# Patient Record
Sex: FEMALE | Race: WHITE | NOT HISPANIC OR LATINO | Employment: OTHER | URBAN - METROPOLITAN AREA
[De-identification: names, ages, dates, MRNs, and addresses within clinical notes are randomized per-mention and may not be internally consistent; named-entity substitution may affect disease eponyms.]

---

## 2017-09-21 ENCOUNTER — HOSPITAL ENCOUNTER (OUTPATIENT)
Dept: RADIOLOGY | Facility: MEDICAL CENTER | Age: 67
End: 2017-09-21
Attending: INTERNAL MEDICINE
Payer: MEDICARE

## 2017-09-21 DIAGNOSIS — M54.40 LOW BACK PAIN WITH SCIATICA, SCIATICA LATERALITY UNSPECIFIED, UNSPECIFIED BACK PAIN LATERALITY, UNSPECIFIED CHRONICITY: ICD-10-CM

## 2017-09-21 DIAGNOSIS — M54.5 LOW BACK PAIN, UNSPECIFIED BACK PAIN LATERALITY, UNSPECIFIED CHRONICITY, WITH SCIATICA PRESENCE UNSPECIFIED: ICD-10-CM

## 2017-09-21 PROCEDURE — 72131 CT LUMBAR SPINE W/O DYE: CPT

## 2017-09-21 PROCEDURE — 72192 CT PELVIS W/O DYE: CPT

## 2018-01-16 ENCOUNTER — HOSPITAL ENCOUNTER (OUTPATIENT)
Dept: RADIOLOGY | Facility: MEDICAL CENTER | Age: 68
End: 2018-01-16
Attending: INTERNAL MEDICINE
Payer: MEDICARE

## 2018-01-16 DIAGNOSIS — M54.5 LOW BACK PAIN, UNSPECIFIED BACK PAIN LATERALITY, UNSPECIFIED CHRONICITY, WITH SCIATICA PRESENCE UNSPECIFIED: ICD-10-CM

## 2018-01-16 DIAGNOSIS — M25.559 PAIN IN JOINT INVOLVING PELVIC REGION AND THIGH, UNSPECIFIED LATERALITY: ICD-10-CM

## 2018-01-16 PROCEDURE — 73721 MRI JNT OF LWR EXTRE W/O DYE: CPT | Mod: RT

## 2018-01-30 ENCOUNTER — APPOINTMENT (OUTPATIENT)
Dept: RADIOLOGY | Facility: MEDICAL CENTER | Age: 68
End: 2018-01-30
Attending: INTERNAL MEDICINE
Payer: MEDICARE

## 2018-01-30 PROCEDURE — 72148 MRI LUMBAR SPINE W/O DYE: CPT

## 2018-02-13 ENCOUNTER — APPOINTMENT (OUTPATIENT)
Dept: RADIOLOGY | Facility: MEDICAL CENTER | Age: 68
End: 2018-02-13
Attending: INTERNAL MEDICINE
Payer: MEDICARE

## 2018-02-13 PROCEDURE — 73721 MRI JNT OF LWR EXTRE W/O DYE: CPT | Mod: LT

## 2018-05-08 ENCOUNTER — HOSPITAL ENCOUNTER (OUTPATIENT)
Dept: LAB | Facility: MEDICAL CENTER | Age: 68
End: 2018-05-08
Attending: INTERNAL MEDICINE
Payer: MEDICARE

## 2018-05-08 LAB
BASOPHILS # BLD AUTO: 0.4 % (ref 0–1.8)
BASOPHILS # BLD: 0.03 K/UL (ref 0–0.12)
CHOLEST SERPL-MCNC: 166 MG/DL (ref 100–199)
EOSINOPHIL # BLD AUTO: 0 K/UL (ref 0–0.51)
EOSINOPHIL NFR BLD: 0 % (ref 0–6.9)
ERYTHROCYTE [DISTWIDTH] IN BLOOD BY AUTOMATED COUNT: 43.9 FL (ref 35.9–50)
HCT VFR BLD AUTO: 40.8 % (ref 37–47)
HDLC SERPL-MCNC: 48 MG/DL
HGB BLD-MCNC: 13.4 G/DL (ref 12–16)
IMM GRANULOCYTES # BLD AUTO: 0.02 K/UL (ref 0–0.11)
IMM GRANULOCYTES NFR BLD AUTO: 0.3 % (ref 0–0.9)
LDLC SERPL CALC-MCNC: 85 MG/DL
LYMPHOCYTES # BLD AUTO: 1.03 K/UL (ref 1–4.8)
LYMPHOCYTES NFR BLD: 14.2 % (ref 22–41)
MCH RBC QN AUTO: 33.8 PG (ref 27–33)
MCHC RBC AUTO-ENTMCNC: 32.8 G/DL (ref 33.6–35)
MCV RBC AUTO: 103 FL (ref 81.4–97.8)
MONOCYTES # BLD AUTO: 0.4 K/UL (ref 0–0.85)
MONOCYTES NFR BLD AUTO: 5.5 % (ref 0–13.4)
NEUTROPHILS # BLD AUTO: 5.75 K/UL (ref 2–7.15)
NEUTROPHILS NFR BLD: 79.6 % (ref 44–72)
NRBC # BLD AUTO: 0 K/UL
NRBC BLD-RTO: 0 /100 WBC
PLATELET # BLD AUTO: 175 K/UL (ref 164–446)
PMV BLD AUTO: 10.5 FL (ref 9–12.9)
RBC # BLD AUTO: 3.96 M/UL (ref 4.2–5.4)
TRIGL SERPL-MCNC: 166 MG/DL (ref 0–149)
WBC # BLD AUTO: 7.2 K/UL (ref 4.8–10.8)

## 2018-05-08 PROCEDURE — 80061 LIPID PANEL: CPT

## 2018-05-08 PROCEDURE — 36415 COLL VENOUS BLD VENIPUNCTURE: CPT

## 2018-05-08 PROCEDURE — 85025 COMPLETE CBC W/AUTO DIFF WBC: CPT

## 2019-10-02 ENCOUNTER — OFFICE VISIT (OUTPATIENT)
Dept: URGENT CARE | Facility: CLINIC | Age: 69
End: 2019-10-02
Payer: MEDICARE

## 2019-10-02 ENCOUNTER — HOSPITAL ENCOUNTER (OUTPATIENT)
Facility: MEDICAL CENTER | Age: 69
End: 2019-10-02
Attending: NURSE PRACTITIONER
Payer: MEDICARE

## 2019-10-02 VITALS
TEMPERATURE: 97.5 F | RESPIRATION RATE: 16 BRPM | OXYGEN SATURATION: 93 % | SYSTOLIC BLOOD PRESSURE: 110 MMHG | HEART RATE: 94 BPM | DIASTOLIC BLOOD PRESSURE: 80 MMHG

## 2019-10-02 DIAGNOSIS — N61.1 ABSCESS OF BREAST: ICD-10-CM

## 2019-10-02 PROCEDURE — 87205 SMEAR GRAM STAIN: CPT

## 2019-10-02 PROCEDURE — 87070 CULTURE OTHR SPECIMN AEROBIC: CPT

## 2019-10-02 PROCEDURE — 10060 I&D ABSCESS SIMPLE/SINGLE: CPT | Performed by: NURSE PRACTITIONER

## 2019-10-02 RX ORDER — ATORVASTATIN CALCIUM 20 MG/1
20 TABLET, FILM COATED ORAL NIGHTLY
COMMUNITY

## 2019-10-02 RX ORDER — TRAZODONE HYDROCHLORIDE 50 MG/1
50 TABLET ORAL NIGHTLY
COMMUNITY

## 2019-10-02 RX ORDER — MULTIVIT-MIN/IRON/FOLIC ACID/K 18-600-40
500 CAPSULE ORAL DAILY
COMMUNITY

## 2019-10-02 RX ORDER — POTASSIUM CHLORIDE 750 MG/1
10 TABLET, EXTENDED RELEASE ORAL 2 TIMES DAILY
COMMUNITY
End: 2020-05-18

## 2019-10-02 RX ORDER — QUETIAPINE FUMARATE 100 MG/1
100 TABLET, FILM COATED ORAL DAILY
COMMUNITY

## 2019-10-02 RX ORDER — LEVOTHYROXINE SODIUM 0.05 MG/1
50 TABLET ORAL
COMMUNITY

## 2019-10-02 RX ORDER — MIRTAZAPINE 15 MG/1
15 TABLET, FILM COATED ORAL NIGHTLY
COMMUNITY

## 2019-10-02 RX ORDER — CELECOXIB 100 MG/1
100 CAPSULE ORAL 2 TIMES DAILY
Status: ON HOLD | COMMUNITY
End: 2020-02-03

## 2019-10-02 RX ORDER — FUROSEMIDE 20 MG/1
20 TABLET ORAL 2 TIMES DAILY
Status: ON HOLD | COMMUNITY
End: 2020-02-03

## 2019-10-02 RX ORDER — DIVALPROEX SODIUM 125 MG/1
375 CAPSULE, COATED PELLETS ORAL 2 TIMES DAILY
COMMUNITY

## 2019-10-02 RX ORDER — DOXYCYCLINE 100 MG/1
CAPSULE ORAL
Refills: 0 | Status: ON HOLD | COMMUNITY
Start: 2019-09-28 | End: 2020-02-03

## 2019-10-02 RX ORDER — SULFAMETHOXAZOLE AND TRIMETHOPRIM 800; 160 MG/1; MG/1
1 TABLET ORAL 2 TIMES DAILY
Qty: 14 TAB | Refills: 0 | Status: SHIPPED | OUTPATIENT
Start: 2019-10-02 | End: 2019-10-09

## 2019-10-02 RX ORDER — POLYETHYLENE GLYCOL 3350 17 G/17G
17 POWDER, FOR SOLUTION ORAL DAILY
COMMUNITY

## 2019-10-02 NOTE — PROGRESS NOTES
"  Chief Complaint   Patient presents with   • Abscess     x 6 days, abces on Rt. breast, redness, swelling       HISTORY OF PRESENT ILLNESS: Patient is a 69 y.o. female who presents to urgent care today by her caregiver, both provide the history.  She notes that she has had a small \"pimple\" to her right breast for the past year.  Notes that the area has become more tender, swollen, painful over the past 4 days.  Notes low-grade fever as well.  Denies any other symptoms.  She has notified the patient's PCP, they have placed her on doxycycline which she has taken for the past 3 days without improvement.  Patient does have a baseline history of Alzheimer's, no recent neurological changes or increase in confusion per her caregiver.    There are no active problems to display for this patient.      Allergies:Ampicillin    Current Outpatient Medications Ordered in Epic   Medication Sig Dispense Refill   • atorvastatin (LIPITOR) 20 MG Tab Take 20 mg by mouth every evening.     • celecoxib (CELEBREX) 100 MG Cap Take 100 mg by mouth 2 times a day.     • Divalproex Sodium (DEPAKOTE) 125 MG Capsule Delayed Release Sprinkle Take 125 mg by mouth 2 Times a Day.     • furosemide (LASIX) 20 MG Tab Take 20 mg by mouth 2 times a day.     • levothyroxine (SYNTHROID) 50 MCG Tab Take 50 mcg by mouth Every morning on an empty stomach.     • mirtazapine (REMERON) 15 MG Tab Take 15 mg by mouth every evening.     • polyethylene glycol/lytes (MIRALAX) Pack Take 17 g by mouth every day.     • potassium chloride SA (K-DUR) 10 MEQ Tab CR Take 10 mEq by mouth 2 times a day.     • QUEtiapine (SEROQUEL) 100 MG Tab Take 100 mg by mouth 2 times a day.     • travoprost (TRAVATAN Z) 0.004 % Solution Place 1 Drop in both eyes every evening.     • traZODone (DESYREL) 50 MG Tab Take 50 mg by mouth every evening.     • Ascorbic Acid (VITAMIN C) 500 MG Cap Take  by mouth.     • doxycycline (MONODOX) 100 MG capsule TAKE 1 CAPSULE BYMOUTH TWICE DAILY FOR 10 " DAYS  0   • sulfamethoxazole-trimethoprim (BACTRIM DS) 800-160 MG tablet Take 1 Tab by mouth 2 times a day for 7 days. 14 Tab 0   • raloxifene (EVISTA) 60 MG TABS Take 60 mg by mouth every day.     • citalopram (CELEXA) 20 MG TABS Take 20 mg by mouth every day.     • travoprost (TRAVOPROST) 0.004 % SOLN Place 1 Drop in both eyes every evening.     • aspirin 81 MG tablet Take 81 mg by mouth every day.     • SENNA by Does not apply route as needed.     • gemfibrozil (LOPID) 600 MG TABS Take 300 mg by mouth 2 Times a Day.     • clonazepam (KLONOPIN) 2 MG tablet Take 1 mg by mouth every bedtime.     • Olopatadine HCl (PATADAY) 0.2 % SOLN by Ophthalmic route.     • ranitidine (ZANTAC) 150 MG TABS Take 150 mg by mouth 2 times a day.     • Naproxen Sodium (ALEVE) 220 MG CAPS Take  by mouth.     • memantine (NAMENDA) 5 MG TABS Take 2 Tabs by mouth 2 times a day. 120 Each 3     No current Epic-ordered facility-administered medications on file.        Past Medical History:   Diagnosis Date   • Dementia (HCC)    • Dementia (HCC)        Social History     Tobacco Use   • Smoking status: Never Smoker   • Smokeless tobacco: Never Used   Substance Use Topics   • Alcohol use: No   • Drug use: No       No family status information on file.   No family history on file.    ROS:  Review of Systems   Constitutional: Positive for subjective fever.  Negative for chills, weight loss, malaise, and fatigue.   HENT: Negative for ear pain, nosebleeds, congestion, sore throat and neck pain.    Eyes: Negative for vision changes.   Neuro: Negative for headache, sensory changes, weakness, seizure, LOC.   Cardiovascular: Negative for chest pain, palpitations, orthopnea and leg swelling.   Respiratory: Negative for cough, sputum production, shortness of breath and wheezing.   Gastrointestinal: Negative for abdominal pain, nausea, vomiting or diarrhea.   Genitourinary: Negative for dysuria, urgency and frequency.  Musculoskeletal: Negative for falls,  neck pain, back pain, joint pain, myalgias.   Skin: Positive for painful abscess to right breast.  Negative for rash, diaphoresis.     Exam:  /80 (BP Location: Left arm, Patient Position: Sitting, BP Cuff Size: Adult)   Pulse 94   Temp 36.4 °C (97.5 °F)   Resp 16   SpO2 93%   General: well-nourished, well-developed female in NAD  Head: normocephalic, atraumatic  Eyes: PERRLA, no conjunctival injection, acuity grossly intact, lids normal.  Ears: normal shape and symmetry, no tenderness, no discharge. External canals are without any significant edema or erythema. Tympanic membranes are without any inflammation, no effusion. Gross auditory acuity is intact.  Nose: symmetrical without tenderness, no discharge.  Mouth/Throat: reasonable hygiene, no erythema, exudates or tonsillar enlargement.  Neck: no masses, range of motion within normal limits, no tracheal deviation. No obvious thyroid enlargement.   Lymph: no cervical adenopathy. No supraclavicular adenopathy.   Neuro: alert and oriented at baseline per caregiver.    Cardiovascular: regular rate and rhythm. No edema.  Pulmonary: no distress. Chest is symmetrical with respiration, no wheezes, crackles, or rhonchi.   Musculoskeletal: no clubbing, appropriate muscle tone, patient is in wheelchair.  Skin: warm, no clubbing, no cyanosis, no rashes.  There is a small, 2 cm, round, fluctuant lesion noted to right breast, area is tender to touch, minimal surrounding erythema, no active drainage.  Psych: appropriate mood, affect, judgement.         Assessment/Plan:  1. Abscess of breast  CULTURE WOUND W/ GRAM STAIN    sulfamethoxazole-trimethoprim (BACTRIM DS) 800-160 MG tablet         Procedure: Incision and Drainage  -Risks, benefits, and alternatives discussed. Risks include infection, bleeding, nerve damage, and poor cosmetic outcome  -Clean technique with sterile instruments  -Local anesthesia with 2% lidocaine  -Incision with #11 blade into fluctuant area with  purulent material expressed  -Culture obtained and packaged for lab  -Irrigated copiously with sterile saline  -No packing placed due to size  -Minimal bleeding with good hemostasis achieved  -The patient tolerated the procedure well        Patient is changed from doxycycline to Bactrim, increase fluid intake.  Culture obtained, will call the patient's caregiver at [phone], Susan, with results.  Wound care discussed.  Follow-up in 3 days with PCP.  Supportive care, differential diagnoses, and indications for immediate follow-up discussed with patient.   Pathogenesis of diagnosis discussed including typical length and natural progression.   Instructed to return to clinic or nearest emergency department for any change in condition, further concerns, or worsening of symptoms.  Patient states understanding of the plan of care and discharge instructions.  Instructed to make an appointment, for follow up, with her primary care provider.        Please note that this dictation was created using voice recognition software. I have made every reasonable attempt to correct obvious errors, but I expect that there are errors of grammar and possibly content that I did not discover before finalizing the note.      KATIA Murillo.

## 2019-10-03 LAB
GRAM STN SPEC: NORMAL
SIGNIFICANT IND 70042: NORMAL
SITE SITE: NORMAL
SOURCE SOURCE: NORMAL

## 2019-10-06 LAB
BACTERIA WND AEROBE CULT: NORMAL
GRAM STN SPEC: NORMAL
SIGNIFICANT IND 70042: NORMAL
SITE SITE: NORMAL
SOURCE SOURCE: NORMAL

## 2020-01-16 ENCOUNTER — HOSPITAL ENCOUNTER (OUTPATIENT)
Dept: LAB | Facility: MEDICAL CENTER | Age: 70
End: 2020-01-16
Attending: INTERNAL MEDICINE
Payer: MEDICARE

## 2020-01-16 LAB
ANION GAP SERPL CALC-SCNC: 9 MMOL/L (ref 0–11.9)
BUN SERPL-MCNC: 18 MG/DL (ref 8–22)
CALCIUM SERPL-MCNC: 9.4 MG/DL (ref 8.5–10.5)
CHLORIDE SERPL-SCNC: 108 MMOL/L (ref 96–112)
CO2 SERPL-SCNC: 25 MMOL/L (ref 20–33)
CREAT SERPL-MCNC: 0.85 MG/DL (ref 0.5–1.4)
GLUCOSE SERPL-MCNC: 87 MG/DL (ref 65–99)
POTASSIUM SERPL-SCNC: 3.9 MMOL/L (ref 3.6–5.5)
SODIUM SERPL-SCNC: 142 MMOL/L (ref 135–145)

## 2020-01-16 PROCEDURE — 80048 BASIC METABOLIC PNL TOTAL CA: CPT

## 2020-01-16 PROCEDURE — 36415 COLL VENOUS BLD VENIPUNCTURE: CPT

## 2020-01-30 ENCOUNTER — HOSPITAL ENCOUNTER (INPATIENT)
Facility: MEDICAL CENTER | Age: 70
LOS: 3 days | DRG: 872 | End: 2020-02-03
Attending: EMERGENCY MEDICINE | Admitting: HOSPITALIST
Payer: MEDICARE

## 2020-01-30 DIAGNOSIS — R19.7 DIARRHEA, UNSPECIFIED TYPE: ICD-10-CM

## 2020-01-30 DIAGNOSIS — R65.10 SYSTEMIC INFLAMMATORY RESPONSE SYNDROME (HCC): ICD-10-CM

## 2020-01-30 DIAGNOSIS — R11.2 INTRACTABLE VOMITING WITH NAUSEA, UNSPECIFIED VOMITING TYPE: ICD-10-CM

## 2020-01-30 LAB
BASOPHILS # BLD AUTO: 0.6 % (ref 0–1.8)
BASOPHILS # BLD: 0.09 K/UL (ref 0–0.12)
EOSINOPHIL # BLD AUTO: 0.08 K/UL (ref 0–0.51)
EOSINOPHIL NFR BLD: 0.6 % (ref 0–6.9)
ERYTHROCYTE [DISTWIDTH] IN BLOOD BY AUTOMATED COUNT: 57.2 FL (ref 35.9–50)
HCT VFR BLD AUTO: 36.8 % (ref 37–47)
HGB BLD-MCNC: 11.1 G/DL (ref 12–16)
IMM GRANULOCYTES # BLD AUTO: 0.1 K/UL (ref 0–0.11)
IMM GRANULOCYTES NFR BLD AUTO: 0.7 % (ref 0–0.9)
LACTATE BLD-SCNC: 3.3 MMOL/L (ref 0.5–2)
LYMPHOCYTES # BLD AUTO: 1.5 K/UL (ref 1–4.8)
LYMPHOCYTES NFR BLD: 10.5 % (ref 22–41)
MCH RBC QN AUTO: 27.6 PG (ref 27–33)
MCHC RBC AUTO-ENTMCNC: 30.2 G/DL (ref 33.6–35)
MCV RBC AUTO: 91.5 FL (ref 81.4–97.8)
MONOCYTES # BLD AUTO: 0.9 K/UL (ref 0–0.85)
MONOCYTES NFR BLD AUTO: 6.3 % (ref 0–13.4)
NEUTROPHILS # BLD AUTO: 11.55 K/UL (ref 2–7.15)
NEUTROPHILS NFR BLD: 81.3 % (ref 44–72)
NRBC # BLD AUTO: 0 K/UL
NRBC BLD-RTO: 0 /100 WBC
PLATELET # BLD AUTO: 312 K/UL (ref 164–446)
PMV BLD AUTO: 9.1 FL (ref 9–12.9)
RBC # BLD AUTO: 4.02 M/UL (ref 4.2–5.4)
WBC # BLD AUTO: 14.2 K/UL (ref 4.8–10.8)

## 2020-01-30 PROCEDURE — 83690 ASSAY OF LIPASE: CPT

## 2020-01-30 PROCEDURE — 99285 EMERGENCY DEPT VISIT HI MDM: CPT

## 2020-01-30 PROCEDURE — 80053 COMPREHEN METABOLIC PANEL: CPT

## 2020-01-30 PROCEDURE — 83605 ASSAY OF LACTIC ACID: CPT

## 2020-01-30 PROCEDURE — 85025 COMPLETE CBC W/AUTO DIFF WBC: CPT

## 2020-01-30 PROCEDURE — 36415 COLL VENOUS BLD VENIPUNCTURE: CPT

## 2020-01-31 ENCOUNTER — APPOINTMENT (OUTPATIENT)
Dept: RADIOLOGY | Facility: MEDICAL CENTER | Age: 70
DRG: 872 | End: 2020-01-31
Attending: EMERGENCY MEDICINE
Payer: MEDICARE

## 2020-01-31 PROBLEM — A41.9 SEPSIS (HCC): Status: ACTIVE | Noted: 2020-01-31

## 2020-01-31 PROBLEM — F03.90 DEMENTIA (HCC): Status: ACTIVE | Noted: 2020-01-31

## 2020-01-31 PROBLEM — R19.7 NAUSEA, VOMITING, AND DIARRHEA: Status: ACTIVE | Noted: 2020-01-31

## 2020-01-31 PROBLEM — E03.9 HYPOTHYROIDISM: Status: ACTIVE | Noted: 2020-01-31

## 2020-01-31 PROBLEM — R11.2 NAUSEA, VOMITING, AND DIARRHEA: Status: ACTIVE | Noted: 2020-01-31

## 2020-01-31 PROBLEM — H40.9 GLAUCOMA: Status: ACTIVE | Noted: 2020-01-31

## 2020-01-31 LAB
ALBUMIN SERPL BCP-MCNC: 3.6 G/DL (ref 3.2–4.9)
ALBUMIN/GLOB SERPL: 1.2 G/DL
ALP SERPL-CCNC: 71 U/L (ref 30–99)
ALT SERPL-CCNC: 12 U/L (ref 2–50)
ANION GAP SERPL CALC-SCNC: 12 MMOL/L (ref 0–11.9)
APPEARANCE UR: CLEAR
AST SERPL-CCNC: 16 U/L (ref 12–45)
BACTERIA #/AREA URNS HPF: ABNORMAL /HPF
BILIRUB SERPL-MCNC: 0.4 MG/DL (ref 0.1–1.5)
BILIRUB UR QL STRIP.AUTO: NEGATIVE
BUN SERPL-MCNC: 16 MG/DL (ref 8–22)
C DIFF DNA SPEC QL NAA+PROBE: NEGATIVE
C DIFF TOX GENS STL QL NAA+PROBE: NEGATIVE
CALCIUM SERPL-MCNC: 9.1 MG/DL (ref 8.5–10.5)
CHLORIDE SERPL-SCNC: 105 MMOL/L (ref 96–112)
CO2 SERPL-SCNC: 24 MMOL/L (ref 20–33)
COLOR UR: YELLOW
CREAT SERPL-MCNC: 1 MG/DL (ref 0.5–1.4)
EPI CELLS #/AREA URNS HPF: NEGATIVE /HPF
GLOBULIN SER CALC-MCNC: 3.1 G/DL (ref 1.9–3.5)
GLUCOSE SERPL-MCNC: 140 MG/DL (ref 65–99)
GLUCOSE UR STRIP.AUTO-MCNC: NEGATIVE MG/DL
HYALINE CASTS #/AREA URNS LPF: ABNORMAL /LPF
KETONES UR STRIP.AUTO-MCNC: ABNORMAL MG/DL
LACTATE BLD-SCNC: 2 MMOL/L (ref 0.5–2)
LACTATE BLD-SCNC: 2.1 MMOL/L (ref 0.5–2)
LACTATE BLD-SCNC: 3.1 MMOL/L (ref 0.5–2)
LEUKOCYTE ESTERASE UR QL STRIP.AUTO: NEGATIVE
LIPASE SERPL-CCNC: 35 U/L (ref 11–82)
MICRO URNS: ABNORMAL
NITRITE UR QL STRIP.AUTO: POSITIVE
PH UR STRIP.AUTO: 8.5 [PH] (ref 5–8)
POTASSIUM SERPL-SCNC: 3.7 MMOL/L (ref 3.6–5.5)
PROT SERPL-MCNC: 6.7 G/DL (ref 6–8.2)
PROT UR QL STRIP: NEGATIVE MG/DL
RBC # URNS HPF: ABNORMAL /HPF
RBC UR QL AUTO: ABNORMAL
SODIUM SERPL-SCNC: 141 MMOL/L (ref 135–145)
SP GR UR STRIP.AUTO: 1.03
UROBILINOGEN UR STRIP.AUTO-MCNC: 0.2 MG/DL
WBC #/AREA URNS HPF: ABNORMAL /HPF

## 2020-01-31 PROCEDURE — 770020 HCHG ROOM/CARE - TELE (206)

## 2020-01-31 PROCEDURE — 96374 THER/PROPH/DIAG INJ IV PUSH: CPT

## 2020-01-31 PROCEDURE — 700105 HCHG RX REV CODE 258: Performed by: HOSPITALIST

## 2020-01-31 PROCEDURE — 74177 CT ABD & PELVIS W/CONTRAST: CPT

## 2020-01-31 PROCEDURE — 83605 ASSAY OF LACTIC ACID: CPT

## 2020-01-31 PROCEDURE — 99223 1ST HOSP IP/OBS HIGH 75: CPT | Performed by: HOSPITALIST

## 2020-01-31 PROCEDURE — 96376 TX/PRO/DX INJ SAME DRUG ADON: CPT

## 2020-01-31 PROCEDURE — 700105 HCHG RX REV CODE 258: Performed by: EMERGENCY MEDICINE

## 2020-01-31 PROCEDURE — 87493 C DIFF AMPLIFIED PROBE: CPT

## 2020-01-31 PROCEDURE — 96361 HYDRATE IV INFUSION ADD-ON: CPT

## 2020-01-31 PROCEDURE — 700111 HCHG RX REV CODE 636 W/ 250 OVERRIDE (IP): Performed by: EMERGENCY MEDICINE

## 2020-01-31 PROCEDURE — 81001 URINALYSIS AUTO W/SCOPE: CPT

## 2020-01-31 PROCEDURE — 700111 HCHG RX REV CODE 636 W/ 250 OVERRIDE (IP): Performed by: HOSPITALIST

## 2020-01-31 PROCEDURE — 700101 HCHG RX REV CODE 250: Performed by: HOSPITALIST

## 2020-01-31 PROCEDURE — 700117 HCHG RX CONTRAST REV CODE 255: Performed by: EMERGENCY MEDICINE

## 2020-01-31 PROCEDURE — 36415 COLL VENOUS BLD VENIPUNCTURE: CPT

## 2020-01-31 RX ORDER — SODIUM CHLORIDE 9 MG/ML
INJECTION, SOLUTION INTRAVENOUS
Status: ACTIVE
Start: 2020-01-31 | End: 2020-01-31

## 2020-01-31 RX ORDER — AMOXICILLIN 250 MG
2 CAPSULE ORAL 2 TIMES DAILY
Status: DISCONTINUED | OUTPATIENT
Start: 2020-01-31 | End: 2020-01-31

## 2020-01-31 RX ORDER — HEPARIN SODIUM 5000 [USP'U]/ML
5000 INJECTION, SOLUTION INTRAVENOUS; SUBCUTANEOUS EVERY 8 HOURS
Status: DISCONTINUED | OUTPATIENT
Start: 2020-01-31 | End: 2020-02-03 | Stop reason: HOSPADM

## 2020-01-31 RX ORDER — ONDANSETRON 2 MG/ML
4 INJECTION INTRAMUSCULAR; INTRAVENOUS ONCE
Status: COMPLETED | OUTPATIENT
Start: 2020-01-31 | End: 2020-01-31

## 2020-01-31 RX ORDER — POLYETHYLENE GLYCOL 3350 17 G/17G
1 POWDER, FOR SOLUTION ORAL
Status: DISCONTINUED | OUTPATIENT
Start: 2020-01-31 | End: 2020-01-31

## 2020-01-31 RX ORDER — BISACODYL 10 MG
10 SUPPOSITORY, RECTAL RECTAL
Status: DISCONTINUED | OUTPATIENT
Start: 2020-01-31 | End: 2020-01-31

## 2020-01-31 RX ORDER — SODIUM CHLORIDE 9 MG/ML
1000 INJECTION, SOLUTION INTRAVENOUS ONCE
Status: COMPLETED | OUTPATIENT
Start: 2020-01-31 | End: 2020-01-31

## 2020-01-31 RX ORDER — SODIUM CHLORIDE, SODIUM LACTATE, POTASSIUM CHLORIDE, CALCIUM CHLORIDE 600; 310; 30; 20 MG/100ML; MG/100ML; MG/100ML; MG/100ML
2000 INJECTION, SOLUTION INTRAVENOUS ONCE
Status: COMPLETED | OUTPATIENT
Start: 2020-01-31 | End: 2020-02-01

## 2020-01-31 RX ADMIN — IOHEXOL 100 ML: 350 INJECTION, SOLUTION INTRAVENOUS at 02:04

## 2020-01-31 RX ADMIN — CEFTRIAXONE SODIUM 2 G: 2 INJECTION, POWDER, FOR SOLUTION INTRAMUSCULAR; INTRAVENOUS at 06:24

## 2020-01-31 RX ADMIN — METRONIDAZOLE 500 MG: 500 INJECTION, SOLUTION INTRAVENOUS at 21:45

## 2020-01-31 RX ADMIN — HEPARIN SODIUM 5000 UNITS: 5000 INJECTION, SOLUTION INTRAVENOUS; SUBCUTANEOUS at 14:34

## 2020-01-31 RX ADMIN — METRONIDAZOLE 500 MG: 500 INJECTION, SOLUTION INTRAVENOUS at 14:33

## 2020-01-31 RX ADMIN — ONDANSETRON 4 MG: 2 INJECTION INTRAMUSCULAR; INTRAVENOUS at 00:40

## 2020-01-31 RX ADMIN — HEPARIN SODIUM 5000 UNITS: 5000 INJECTION, SOLUTION INTRAVENOUS; SUBCUTANEOUS at 06:23

## 2020-01-31 RX ADMIN — ONDANSETRON 4 MG: 2 INJECTION INTRAMUSCULAR; INTRAVENOUS at 01:35

## 2020-01-31 RX ADMIN — SODIUM CHLORIDE, POTASSIUM CHLORIDE, SODIUM LACTATE AND CALCIUM CHLORIDE 2000 ML: 600; 310; 30; 20 INJECTION, SOLUTION INTRAVENOUS at 06:22

## 2020-01-31 RX ADMIN — METRONIDAZOLE 500 MG: 500 INJECTION, SOLUTION INTRAVENOUS at 07:31

## 2020-01-31 RX ADMIN — SODIUM CHLORIDE 1000 ML: 9 INJECTION, SOLUTION INTRAVENOUS at 00:40

## 2020-01-31 RX ADMIN — HEPARIN SODIUM 5000 UNITS: 5000 INJECTION, SOLUTION INTRAVENOUS; SUBCUTANEOUS at 21:45

## 2020-01-31 ASSESSMENT — PATIENT HEALTH QUESTIONNAIRE - PHQ9
2. FEELING DOWN, DEPRESSED, IRRITABLE, OR HOPELESS: NOT AT ALL
1. LITTLE INTEREST OR PLEASURE IN DOING THINGS: NOT AT ALL
SUM OF ALL RESPONSES TO PHQ9 QUESTIONS 1 AND 2: 0

## 2020-01-31 NOTE — ED NOTES
Bedside report to receiving RN for T810. Questions answered. All belongings accounted for at transfer.

## 2020-01-31 NOTE — ED PROVIDER NOTES
"ED Provider Note    CHIEF COMPLAINT  Chief Complaint   Patient presents with   • Abdominal Pain   • N/V       HPI  Sameera Salinas is a 69 y.o. female who presents for evaluation of sudden onset nausea and vomiting which started around 10:00 tonight.  The patient's group home also has another sick resident who was vomiting this morning and ate the same dinner last night.  It is unclear if this is related however.  The patient has not had any diarrhea and no measured fevers.  She was complaining of abdominal pain when the vomiting started.  She has had multiple episodes.  She has known dementia and is generally pleasantly confused to situation, time, and place    REVIEW OF SYSTEMS * unclear reliability due to dementia  Constitutional: No measured fevers.  Current chills noted.  Fatigue noted.  Skin: No rashes  HEENT: No sore throat, runny nose, sores, or trouble swallowing  Neck: No neck pain  Chest: No pain   Pulm: No shortness of breath or cough  Gastrointestinal: No diarrhea or constipation  Genitourinary: No dysuria or hematuria  Musculoskeletal: No recent trauma, pain, swelling, weakness  Heme: No bleeding or bruising problems.   Immuno: No hx of recurrent infections      PAST MEDICAL HISTORY   has a past medical history of Dementia (HCC) and Dementia (HCC).    SOCIAL HISTORY  Social History     Tobacco Use   • Smoking status: Never Smoker   • Smokeless tobacco: Never Used   Substance and Sexual Activity   • Alcohol use: No   • Drug use: No   • Sexual activity: Not on file       SURGICAL HISTORY  patient denies any surgical history    CURRENT MEDICATIONS  Home Medications    **Home medications have not yet been reviewed for this encounter**         ALLERGIES  Allergies   Allergen Reactions   • Ampicillin Rash       PHYSICAL EXAM  VITAL SIGNS: /72   Pulse (!) 120   Temp 36.4 °C (97.6 °F) (Temporal)   Ht 1.6 m (5' 3\")   Wt 59 kg (130 lb)   SpO2 94%   BMI 23.03 kg/m²    Gen: Mildly anxious, pale, " sweaty  HEENT: No signs of trauma, Bilateral external ears normal, Nose normal. Conjunctiva normal, Non-icteric.   Neck:  No tenderness, Supple, No masses  Lymphatic: No cervical lymphadenopathy noted.   Cardiovascular: Mild tachycardia with regular rhythm.  Thorax & Lungs: Normal breath sounds, No respiratory distress, No wheezing bilateral chest rise  Abdomen: Bowel sounds normal, Soft, diffuse mild to moderate tenderness, No masses, No pulsatile masses. Skin: Cool, pale, sweaty, No erythema, No rash to exposed areas.   Extremities: Intact distal pulses, No edema      LABS  Results for orders placed or performed during the hospital encounter of 01/30/20   CBC WITH DIFFERENTIAL   Result Value Ref Range    WBC 14.2 (H) 4.8 - 10.8 K/uL    RBC 4.02 (L) 4.20 - 5.40 M/uL    Hemoglobin 11.1 (L) 12.0 - 16.0 g/dL    Hematocrit 36.8 (L) 37.0 - 47.0 %    MCV 91.5 81.4 - 97.8 fL    MCH 27.6 27.0 - 33.0 pg    MCHC 30.2 (L) 33.6 - 35.0 g/dL    RDW 57.2 (H) 35.9 - 50.0 fL    Platelet Count 312 164 - 446 K/uL    MPV 9.1 9.0 - 12.9 fL    Neutrophils-Polys 81.30 (H) 44.00 - 72.00 %    Lymphocytes 10.50 (L) 22.00 - 41.00 %    Monocytes 6.30 0.00 - 13.40 %    Eosinophils 0.60 0.00 - 6.90 %    Basophils 0.60 0.00 - 1.80 %    Immature Granulocytes 0.70 0.00 - 0.90 %    Nucleated RBC 0.00 /100 WBC    Neutrophils (Absolute) 11.55 (H) 2.00 - 7.15 K/uL    Lymphs (Absolute) 1.50 1.00 - 4.80 K/uL    Monos (Absolute) 0.90 (H) 0.00 - 0.85 K/uL    Eos (Absolute) 0.08 0.00 - 0.51 K/uL    Baso (Absolute) 0.09 0.00 - 0.12 K/uL    Immature Granulocytes (abs) 0.10 0.00 - 0.11 K/uL    NRBC (Absolute) 0.00 K/uL   COMP METABOLIC PANEL   Result Value Ref Range    Sodium 141 135 - 145 mmol/L    Potassium 3.7 3.6 - 5.5 mmol/L    Chloride 105 96 - 112 mmol/L    Co2 24 20 - 33 mmol/L    Anion Gap 12.0 (H) 0.0 - 11.9    Glucose 140 (H) 65 - 99 mg/dL    Bun 16 8 - 22 mg/dL    Creatinine 1.00 0.50 - 1.40 mg/dL    Calcium 9.1 8.5 - 10.5 mg/dL    AST(SGOT) 16 12  - 45 U/L    ALT(SGPT) 12 2 - 50 U/L    Alkaline Phosphatase 71 30 - 99 U/L    Total Bilirubin 0.4 0.1 - 1.5 mg/dL    Albumin 3.6 3.2 - 4.9 g/dL    Total Protein 6.7 6.0 - 8.2 g/dL    Globulin 3.1 1.9 - 3.5 g/dL    A-G Ratio 1.2 g/dL   LIPASE   Result Value Ref Range    Lipase 35 11 - 82 U/L   LACTIC ACID   Result Value Ref Range    Lactic Acid 3.3 (H) 0.5 - 2.0 mmol/L   ESTIMATED GFR   Result Value Ref Range    GFR If African American >60 >60 mL/min/1.73 m 2    GFR If Non African American 55 (A) >60 mL/min/1.73 m 2       RADIOLOGY  CT-ABDOMEN-PELVIS WITH   Final Result      1.  No evidence of acute inflammatory process in the abdomen or pelvis   2.  Cholelithiasis   3.  Moderate-sized hiatal hernia   4.  Incompletely characterized LEFT renal lesion which may have a thick septation. Recommend further assessment with renal mass protocol CT or MRI when clinically appropriate   5.  Atherosclerosis   6.  Colonic diverticulosis                         HYDRATION: Based on the patient's presentation of Acute Diarrhea the patient was given IV fluids. IV Hydration was used because oral hydration was not adequate alone. Upon recheck following hydration, the patient was stable.    COURSE & MEDICAL DECISION MAKING  Pertinent Labs & Imaging studies reviewed. (See chart for details)  Patient's initial evaluation is suggestive of possible gastroenteritis however her abdominal pain, which is poorly localized and inconsistent in its character and severity per the patient, could indicate surgical pathology such as cholecystitis, diverticulitis, small bowel obstruction, or appendicitis.  I will consider cardiac pathology however think this is far less likely than gastroenteritis of some sort.  The patient will be started on IV fluids as she is clearly incapable of keeping p.o. fluids down and will become dehydrated quickly.  Patient has not been on antibiotics recently but is at risk for C. difficile due to her age and the fact that  she lives in a group home.  There is a possible sick contact as well and another resident of a group home.    2:15 AM  Patient is still nauseous and vomiting intermittently despite 2 doses of Zofran and IV fluids.  CT imaging demonstrates cholelithiasis, moderate sized hernia and a left renal lesion of unclear significance.  There are no clear findings to suggest surgical pathology at this time but, as the patient is quite intractably vomiting and having profuse diarrhea with incontinence, she will need to be admitted for further treatment and evaluation.  I will add on C. difficile testing.  Patient is remaining tachycardic and meets criteria for Sirs based on a white blood cell count over 12,000.  This is undifferentiated and there are no convincing findings to suggest a significant bacterial source at this time.  Prophylactic antibiotics seem unwise at this point given the possibility of C. difficile and a lack of any other bacterial source so I will defer this to the judgment of the admitting hospitalist.    FINAL IMPRESSION  1. Intractable vomiting with nausea, unspecified vomiting type    2. Diarrhea, unspecified type    3. Systemic inflammatory response syndrome (HCC)        Electronically signed by: Melecio Villasenor M.D., 1/30/2020 11:36 PM

## 2020-01-31 NOTE — PROGRESS NOTES
Accepted patient admitted by my colleague  Patient has advanced dementia  Nurse was talking to the caretaker and apparently she is a DNR/DNI but we need to confirm this with the legal guardian  Patient cannot provide any history for me  C. difficile has been ruled out, stop isolation precautions and oral vancomycin  Treat empirically  Continue IV antibiotic

## 2020-01-31 NOTE — PROGRESS NOTES
2 RN skin check completed with Santos HWANG.  Devices in place n/a.  Skin assessed under devices n/a.  Confirmed pressure ulcers found on n/a.  New potential pressure ulcers noted on n/a. Wound consult placed n/a.    No open wounds or pressure ulcers noted. Mild incontinent dermatitis/excoriation to buttock. Otherwise skin completely intact.     The following interventions in place: Waffle mattress, q2h turns, incontinence management.

## 2020-01-31 NOTE — CARE PLAN
Problem: Safety  Goal: Will remain free from injury  Outcome: PROGRESSING AS EXPECTED   Fall precautions in place. Bed in lowest position. Non-skid socks in place. Personal possessions within reach. Mobility sign on door. Bed-alarm on. Call light within reach. Pt educated regarding fall prevention, needs continued follow up. Presenting with confusion A&Ox self only.   Problem: Infection  Goal: Will remain free from infection  Outcome: PROGRESSING AS EXPECTED   Pt receiving IV antibiotics. R/o C.diff. Urine sent.

## 2020-01-31 NOTE — PROGRESS NOTES
Pt admitted to room T-810 in stable condition. Cleaned up of large liquid incontinence in ED prior to bringing to floor. Denies any pain, nausea or abdominal discomfort at this time. Alert and oriented to self only, very confused. IVF and antibiotics started. Pt appears comfortable resting in bed at this time. Denies any needs. Bed alarm set and call light in reach.

## 2020-01-31 NOTE — ASSESSMENT & PLAN NOTE
Treatment as noted above.  Symptomatic management for nausea and vomiting.  Continue IV fluid hydration, but will decrease the rate as oral intake is improved

## 2020-01-31 NOTE — ASSESSMENT & PLAN NOTE
We will need to obtain information from the group home regarding her home medications and resume in the morning.

## 2020-01-31 NOTE — ED NOTES
Pt incontinent of stool. Pt changed and cleaned up. Linens changed. Pt began vomiting in room. ERP aware. Orders received.

## 2020-01-31 NOTE — H&P
Hospital Medicine History & Physical Note    Date of Service  1/31/2020    Primary Care Physician  Noni Velásquez M.D.    Consultants  None    Code Status  Full    Chief Complaint  Chief Complaint   Patient presents with   • Abdominal Pain   • N/V       History of Presenting Illness  69 y.o. female who presented on 1/30/2020 with nausea, vomiting, and diarrhea.  Patient carries a history of dementia which is advanced, she is unable to provide any meaningful history.  No staff members from her group home have accompanied her to the emergency room therefore history is obtained wholly and review of available medical records and discussion the emergency room physician.  Only available medical records are a note from [facility] from 2018 as well as a handwritten note from her PCP from 2018.  She apparently has a history of chronic lower back pain, osteopenia, glaucoma, depression, and hypothyroidism.  This evening around 8 PM, the patient had acute onset nausea, vomiting, and upon arrival in the emergency room, had several episodes of malodorous diarrhea.  Otherwise no additional history can be obtained at this time.    Review of Systems  Review of Systems   Unable to perform ROS: Dementia       Past Medical History  Past Medical History:   Diagnosis Date   • Dementia (HCC)    • Dementia (HCC)        Surgical History  Unable to obtain from patient who cannot effectively communicate    Family History  Unable to obtain from patient who cannot effectively communicate    Social History pain  Social History     Tobacco Use   • Smoking status: Never Smoker   • Smokeless tobacco: Never Used   Substance Use Topics   • Alcohol use: No   • Drug use: No       Allergies  Allergies   Allergen Reactions   • Ampicillin Rash       Medications  No current facility-administered medications on file prior to encounter.      Current Outpatient Medications on File Prior to Encounter   Medication Sig Dispense Refill   • atorvastatin  (LIPITOR) 20 MG Tab Take 20 mg by mouth every evening.     • celecoxib (CELEBREX) 100 MG Cap Take 100 mg by mouth 2 times a day.     • Divalproex Sodium (DEPAKOTE) 125 MG Capsule Delayed Release Sprinkle Take 125 mg by mouth 2 Times a Day.     • furosemide (LASIX) 20 MG Tab Take 20 mg by mouth 2 times a day.     • levothyroxine (SYNTHROID) 50 MCG Tab Take 50 mcg by mouth Every morning on an empty stomach.     • mirtazapine (REMERON) 15 MG Tab Take 15 mg by mouth every evening.     • polyethylene glycol/lytes (MIRALAX) Pack Take 17 g by mouth every day.     • potassium chloride SA (K-DUR) 10 MEQ Tab CR Take 10 mEq by mouth 2 times a day.     • QUEtiapine (SEROQUEL) 100 MG Tab Take 100 mg by mouth 2 times a day.     • travoprost (TRAVATAN Z) 0.004 % Solution Place 1 Drop in both eyes every evening.     • traZODone (DESYREL) 50 MG Tab Take 50 mg by mouth every evening.     • Ascorbic Acid (VITAMIN C) 500 MG Cap Take  by mouth.     • doxycycline (MONODOX) 100 MG capsule TAKE 1 CAPSULE BYMOUTH TWICE DAILY FOR 10 DAYS  0   • gemfibrozil (LOPID) 600 MG TABS Take 300 mg by mouth 2 Times a Day.     • raloxifene (EVISTA) 60 MG TABS Take 60 mg by mouth every day.     • clonazepam (KLONOPIN) 2 MG tablet Take 1 mg by mouth every bedtime.     • citalopram (CELEXA) 20 MG TABS Take 20 mg by mouth every day.     • Olopatadine HCl (PATADAY) 0.2 % SOLN by Ophthalmic route.     • travoprost (TRAVOPROST) 0.004 % SOLN Place 1 Drop in both eyes every evening.     • ranitidine (ZANTAC) 150 MG TABS Take 150 mg by mouth 2 times a day.     • aspirin 81 MG tablet Take 81 mg by mouth every day.     • Naproxen Sodium (ALEVE) 220 MG CAPS Take  by mouth.     • SENNA by Does not apply route as needed.     • memantine (NAMENDA) 5 MG TABS Take 2 Tabs by mouth 2 times a day. 120 Each 3       Physical Exam  Hemodynamics  Temp (24hrs), Av.4 °C (97.6 °F), Min:36.4 °C (97.6 °F), Max:36.4 °C (97.6 °F)   Temperature: 36.4 °C (97.6 °F)  Pulse  Av.6   Min: 104  Max: 120    Blood Pressure : 135/60      Respiratory      Pulse Oximetry: 95 %             Physical Exam   Constitutional: No distress.   HENT:   Head: Normocephalic and atraumatic.   Right Ear: External ear normal.   Left Ear: External ear normal.   Eyes: EOM are normal. Right eye exhibits no discharge. Left eye exhibits no discharge.   Neck: Neck supple. No JVD present.   Cardiovascular: Normal rate, regular rhythm and normal heart sounds.   Pulmonary/Chest: Effort normal and breath sounds normal. No respiratory distress. She exhibits no tenderness.   Abdominal: Soft. Bowel sounds are normal. She exhibits no distension. There is no tenderness.   Musculoskeletal: Normal range of motion.         General: No edema.   Neurological: No cranial nerve deficit.   Skin: Skin is warm and dry. She is not diaphoretic. No erythema. There is pallor.   Psychiatric: Cognition and memory are impaired. She exhibits abnormal recent memory and abnormal remote memory.   Nursing note and vitals reviewed.    Capillary refill less than 3 seconds, distal pulses intact    Laboratory:  Recent Labs     01/30/20  2316   WBC 14.2*   RBC 4.02*   HEMOGLOBIN 11.1*   HEMATOCRIT 36.8*   MCV 91.5   MCH 27.6   MCHC 30.2*   RDW 57.2*   PLATELETCT 312   MPV 9.1     Recent Labs     01/30/20  2316   SODIUM 141   POTASSIUM 3.7   CHLORIDE 105   CO2 24   GLUCOSE 140*   BUN 16   CREATININE 1.00   CALCIUM 9.1     Recent Labs     01/30/20  2316   ALTSGPT 12   ASTSGOT 16   ALKPHOSPHAT 71   TBILIRUBIN 0.4   LIPASE 35   GLUCOSE 140*                 No results found for: TROPONINI    Imaging  Ct-abdomen-pelvis With    Result Date: 1/31/2020 1/31/2020 1:39 AM HISTORY/REASON FOR EXAM: Generalized abdominal pain with nausea and emesis TECHNIQUE/EXAM DESCRIPTION: CT scan of the abdomen and pelvis with contrast, 1/31/2020 1:39 AM. Contrast-enhanced helical scanning was obtained from the diaphragmatic domes through the pubic symphysis following the bolus  administration of 100 mL of Omnipaque 350 nonionic contrast without complication. Low dose optimization technique was utilized for this CT exam including automated exposure control and adjustment of the mA and/or kV according to patient size. COMPARISON: No prior studies available. FINDINGS: CT Abdomen: There is no evidence of focal consolidation or pleural effusion seen within the lung bases. The liver is unremarkable in appearance. A gallstone is present. Gallbladder is otherwise unremarkable. There is a moderate-sized hiatal hernia. The pancreas is unremarkable. The spleen is unremarkable. The adrenal glands are unremarkable. 17 mm hypodense LEFT renal lesion contains a septation. Kidneys are otherwise unremarkable. The aorta is normal in caliber.  No evidence of retroperitoneal adenopathy.  There is mixed density atherosclerotic plaque. CT Pelvis: There is no evidence of bowel obstruction or inflammatory change. There are sparse distal colonic diverticuli. The appendix is visualized and appears normal. There is no evidence of free fluid. No acute or suspicious osseous lesion is seen.     1.  No evidence of acute inflammatory process in the abdomen or pelvis 2.  Cholelithiasis 3.  Moderate-sized hiatal hernia 4.  Incompletely characterized LEFT renal lesion which may have a thick septation. Recommend further assessment with renal mass protocol CT or MRI when clinically appropriate 5.  Atherosclerosis 6.  Colonic diverticulosis         Assessment/Plan:  Anticipate that patient will need greater than 2 midnights for management of the discussed medical issues.    * Sepsis (HCC)  Assessment & Plan  This is Sepsis Present on admission  SIRS criteria identified on my evaluation include: Tachycardia, with heart rate greater than 90 BPM and Leukocyosis, with WBC greater than 12,000  Source is suspect GI  Sepsis protocol initiated  Fluid resuscitation ordered per protocol  IV antibiotics as appropriate for source of  sepsis  While organ dysfunction may be noted elsewhere in this problem list or in the chart, degree of organ dysfunction does not meet CMS criteria for severe sepsis    Patient has leukocytosis, she is tachycardic, she has lactic acidosis.  Patient is afebrile with stable blood pressure and suspected source is GI.  She does have copious, malodorous stool and lives in a group home which increases her risks of bacterial diarrhea.  Given her dementia, she is unable to give any history regarding PPI or antibiotic use.  C. difficile studies have been sent and I will start her on ceftriaxone and Flagyl for now but we will adjust these medications based on her stool studies.    Nausea, vomiting, and diarrhea  Assessment & Plan  Treatment as noted above.  Symptomatic management for nausea and vomiting.  Continue IV fluid hydration.    Glaucoma  Assessment & Plan  We will need to obtain information from the group home regarding her home medications and resume in the morning.    Hypothyroidism  Assessment & Plan  We will need to obtain information from the group home regarding her home medications and resume in the morning.    Dementia (HCC)  Assessment & Plan  Patient's dementia appears to be rather advanced stage, no staff members from her group home are at bedside to provide baseline mental status.  Patient is a guardian of the state.  We will need to obtain information from the group home regarding her home medications and resume in the morning.      Prophylaxis: Heparin for DVT prophylaxis, no PPI indicated, bowel protocol as needed

## 2020-01-31 NOTE — PROGRESS NOTES
Assumed care of patient, bedside report completed with night shift RN. Patient confused, calm and cooperative, A&Ox self only. Assessment completed. POC discussed. Fall/protective contact precautions in place, bed alarm on, call bell within reach.

## 2020-01-31 NOTE — ED TRIAGE NOTES
"Chief Complaint   Patient presents with   • Abdominal Pain   • N/V     Pt BIB EMS from a group home. Pt has dementia at baseline. Group home reports pt had one episode of vomiting at about 2000 tonight. EMS described emesis to be \"darker in color\". Pt received 4zofran IV via EMS and has no further emesis. Pt c/o abd pain as well, no distention noted. FSBS 148 per EMS.       "

## 2020-01-31 NOTE — ASSESSMENT & PLAN NOTE
-Appears that her dementia is quite advanced  -She is confirmed DNR/DNI per her legal guardian  -Chest to be fed all meals therefore no feeding tube required

## 2020-01-31 NOTE — ASSESSMENT & PLAN NOTE
This is Sepsis Present on admission  SIRS criteria identified on my evaluation include: Tachycardia, with heart rate greater than 90 BPM and Leukocyosis, with WBC greater than 12,000  Source is suspect GI  Sepsis protocol initiated  Fluid resuscitation ordered per protocol  IV antibiotics as appropriate for source of sepsis  While organ dysfunction may be noted elsewhere in this problem list or in the chart, degree of organ dysfunction does not meet CMS criteria for severe sepsis    -Unclear etiology  -CT abdomen pelvis was relatively unremarkable  -C. difficile colitis assay was negative  -No diarrhea, nausea and vomiting have improved, white blood cell count is normalized  -Continue empiric ceftriaxone and Flagyl, day 3, will do 5-day course  --UA is unremarkable and blood cultures are no growth to date

## 2020-02-01 PROBLEM — A41.9 SEPSIS (HCC): Status: RESOLVED | Noted: 2020-01-31 | Resolved: 2020-02-01

## 2020-02-01 PROBLEM — R19.7 NAUSEA, VOMITING, AND DIARRHEA: Status: RESOLVED | Noted: 2020-01-31 | Resolved: 2020-02-01

## 2020-02-01 PROBLEM — R11.2 NAUSEA, VOMITING, AND DIARRHEA: Status: RESOLVED | Noted: 2020-01-31 | Resolved: 2020-02-01

## 2020-02-01 LAB
ANION GAP SERPL CALC-SCNC: 9 MMOL/L (ref 0–11.9)
BUN SERPL-MCNC: 8 MG/DL (ref 8–22)
CALCIUM SERPL-MCNC: 9.1 MG/DL (ref 8.5–10.5)
CHLORIDE SERPL-SCNC: 104 MMOL/L (ref 96–112)
CO2 SERPL-SCNC: 24 MMOL/L (ref 20–33)
CREAT SERPL-MCNC: 0.85 MG/DL (ref 0.5–1.4)
ERYTHROCYTE [DISTWIDTH] IN BLOOD BY AUTOMATED COUNT: 55.2 FL (ref 35.9–50)
GLUCOSE SERPL-MCNC: 106 MG/DL (ref 65–99)
HCT VFR BLD AUTO: 36.1 % (ref 37–47)
HGB BLD-MCNC: 11 G/DL (ref 12–16)
MCH RBC QN AUTO: 27.1 PG (ref 27–33)
MCHC RBC AUTO-ENTMCNC: 30.5 G/DL (ref 33.6–35)
MCV RBC AUTO: 88.9 FL (ref 81.4–97.8)
PLATELET # BLD AUTO: 239 K/UL (ref 164–446)
PMV BLD AUTO: 9 FL (ref 9–12.9)
POTASSIUM SERPL-SCNC: 3.7 MMOL/L (ref 3.6–5.5)
RBC # BLD AUTO: 4.06 M/UL (ref 4.2–5.4)
SODIUM SERPL-SCNC: 137 MMOL/L (ref 135–145)
TSH SERPL DL<=0.005 MIU/L-ACNC: 3.77 UIU/ML (ref 0.38–5.33)
WBC # BLD AUTO: 10.8 K/UL (ref 4.8–10.8)

## 2020-02-01 PROCEDURE — 99233 SBSQ HOSP IP/OBS HIGH 50: CPT | Performed by: INTERNAL MEDICINE

## 2020-02-01 PROCEDURE — 36415 COLL VENOUS BLD VENIPUNCTURE: CPT

## 2020-02-01 PROCEDURE — 700102 HCHG RX REV CODE 250 W/ 637 OVERRIDE(OP): Performed by: INTERNAL MEDICINE

## 2020-02-01 PROCEDURE — 700101 HCHG RX REV CODE 250: Performed by: HOSPITALIST

## 2020-02-01 PROCEDURE — 80048 BASIC METABOLIC PNL TOTAL CA: CPT

## 2020-02-01 PROCEDURE — A9270 NON-COVERED ITEM OR SERVICE: HCPCS | Performed by: INTERNAL MEDICINE

## 2020-02-01 PROCEDURE — 700105 HCHG RX REV CODE 258: Performed by: HOSPITALIST

## 2020-02-01 PROCEDURE — 770020 HCHG ROOM/CARE - TELE (206)

## 2020-02-01 PROCEDURE — 87040 BLOOD CULTURE FOR BACTERIA: CPT | Mod: 91

## 2020-02-01 PROCEDURE — 700105 HCHG RX REV CODE 258: Performed by: INTERNAL MEDICINE

## 2020-02-01 PROCEDURE — 700111 HCHG RX REV CODE 636 W/ 250 OVERRIDE (IP): Performed by: HOSPITALIST

## 2020-02-01 PROCEDURE — 84443 ASSAY THYROID STIM HORMONE: CPT

## 2020-02-01 PROCEDURE — 85027 COMPLETE CBC AUTOMATED: CPT

## 2020-02-01 RX ORDER — LEVOTHYROXINE SODIUM 0.05 MG/1
50 TABLET ORAL
Status: DISCONTINUED | OUTPATIENT
Start: 2020-02-01 | End: 2020-02-03 | Stop reason: HOSPADM

## 2020-02-01 RX ORDER — DEXTROSE AND SODIUM CHLORIDE 5; .9 G/100ML; G/100ML
INJECTION, SOLUTION INTRAVENOUS CONTINUOUS
Status: DISCONTINUED | OUTPATIENT
Start: 2020-02-01 | End: 2020-02-03 | Stop reason: HOSPADM

## 2020-02-01 RX ADMIN — HEPARIN SODIUM 5000 UNITS: 5000 INJECTION, SOLUTION INTRAVENOUS; SUBCUTANEOUS at 05:02

## 2020-02-01 RX ADMIN — CEFTRIAXONE SODIUM 2 G: 2 INJECTION, POWDER, FOR SOLUTION INTRAMUSCULAR; INTRAVENOUS at 05:02

## 2020-02-01 RX ADMIN — LEVOTHYROXINE SODIUM 50 MCG: 50 TABLET ORAL at 15:06

## 2020-02-01 RX ADMIN — DEXTROSE AND SODIUM CHLORIDE: 5; 900 INJECTION, SOLUTION INTRAVENOUS at 15:06

## 2020-02-01 RX ADMIN — METRONIDAZOLE 500 MG: 500 INJECTION, SOLUTION INTRAVENOUS at 06:22

## 2020-02-01 RX ADMIN — METRONIDAZOLE 500 MG: 500 INJECTION, SOLUTION INTRAVENOUS at 22:16

## 2020-02-01 RX ADMIN — HEPARIN SODIUM 5000 UNITS: 5000 INJECTION, SOLUTION INTRAVENOUS; SUBCUTANEOUS at 22:15

## 2020-02-01 RX ADMIN — METRONIDAZOLE 500 MG: 500 INJECTION, SOLUTION INTRAVENOUS at 15:06

## 2020-02-01 RX ADMIN — HEPARIN SODIUM 5000 UNITS: 5000 INJECTION, SOLUTION INTRAVENOUS; SUBCUTANEOUS at 14:00

## 2020-02-01 NOTE — PROGRESS NOTES
Assumed care at 0700, bedside report received from Veronica HWANG. Pt. Is SR on the monitor. Initial assessment completed, orders reviewed, call light within reach, bed alarm is in use, and hourly rounding in place. POC addressed with patient, no additional questions at this time.

## 2020-02-01 NOTE — PROGRESS NOTES
Hospital Medicine Daily Progress Note    Date of Service  2/1/2020    Chief Complaint  69 y.o. female admitted 1/30/2020 with abdominal pain nausea vomiting and fevers    Hospital Course    See below      Interval Problem Update  Advanced dementia-unclear what patient's baseline is.  He has a legal guardian through [employer] [place].  We have been attempting to get a hold of this individual to determine CODE STATUS.  She cannot provide any reliable history for me whatsoever.    Fevers and nausea and vomiting-she had a temperature of 101.0 last night and C. difficile was ruled out yesterday morning.  Per nursing staff her diarrhea is resolved.  She is not eating any foods and she is been here.  Unclear what her normal baseline is for oral intake.    Consultants/Specialty  none    Code Status  fcfc    Disposition  tele    Review of Systems  Review of Systems   Unable to perform ROS: Dementia        Physical Exam  Temp:  [36.9 °C (98.4 °F)-38.8 °C (101.9 °F)] 36.9 °C (98.4 °F)  Pulse:  [] 90  Resp:  [16-17] 16  BP: (106-136)/(62-83) 117/73  SpO2:  [91 %-98 %] 95 %    Physical Exam  Vitals signs and nursing note reviewed.   Constitutional:       General: She is not in acute distress.     Appearance: She is well-developed.      Comments: Patient is conversant, but is A&O x1, only knows her name   HENT:      Head: Normocephalic and atraumatic.      Mouth/Throat:      Pharynx: No oropharyngeal exudate.   Eyes:      General: No scleral icterus.     Pupils: Pupils are equal, round, and reactive to light.   Neck:      Musculoskeletal: Normal range of motion and neck supple.      Thyroid: No thyromegaly.   Cardiovascular:      Rate and Rhythm: Normal rate and regular rhythm.      Heart sounds: Normal heart sounds. No murmur.   Pulmonary:      Effort: Pulmonary effort is normal. No respiratory distress.      Breath sounds: Normal breath sounds. No wheezing.   Abdominal:      General: Bowel sounds are normal.  There is no distension.      Palpations: Abdomen is soft.      Tenderness: There is no tenderness.   Musculoskeletal: Normal range of motion.         General: No tenderness.   Skin:     General: Skin is warm and dry.      Coloration: Skin is pale.      Findings: No rash.   Neurological:      Mental Status: She is alert.      Cranial Nerves: No cranial nerve deficit.         Fluids  No intake or output data in the 24 hours ending 02/01/20 1421    Laboratory  Recent Labs     01/30/20 2316 02/01/20  0243   WBC 14.2* 10.8   RBC 4.02* 4.06*   HEMOGLOBIN 11.1* 11.0*   HEMATOCRIT 36.8* 36.1*   MCV 91.5 88.9   MCH 27.6 27.1   MCHC 30.2* 30.5*   RDW 57.2* 55.2*   PLATELETCT 312 239   MPV 9.1 9.0     Recent Labs     01/30/20 2316 02/01/20  0243   SODIUM 141 137   POTASSIUM 3.7 3.7   CHLORIDE 105 104   CO2 24 24   GLUCOSE 140* 106*   BUN 16 8   CREATININE 1.00 0.85   CALCIUM 9.1 9.1                   Imaging  CT-ABDOMEN-PELVIS WITH   Final Result      1.  No evidence of acute inflammatory process in the abdomen or pelvis   2.  Cholelithiasis   3.  Moderate-sized hiatal hernia   4.  Incompletely characterized LEFT renal lesion which may have a thick septation. Recommend further assessment with renal mass protocol CT or MRI when clinically appropriate   5.  Atherosclerosis   6.  Colonic diverticulosis                          Assessment/Plan  * Sepsis (HCC)- (present on admission)  Assessment & Plan  This is Sepsis Present on admission  SIRS criteria identified on my evaluation include: Tachycardia, with heart rate greater than 90 BPM and Leukocyosis, with WBC greater than 12,000  Source is suspect GI  Sepsis protocol initiated  Fluid resuscitation ordered per protocol  IV antibiotics as appropriate for source of sepsis  While organ dysfunction may be noted elsewhere in this problem list or in the chart, degree of organ dysfunction does not meet CMS criteria for severe sepsis    -Unclear etiology  -CT abdomen pelvis was  relatively unremarkable  -C. difficile colitis assay was negative  -No diarrhea but patient is continued poor intake, unclear much is related to her advanced dementia  -Continue empiric ceftriaxone and Flagyl, day 2  --UA is unremarkable and blood cultures are no growth to date    Nausea, vomiting, and diarrhea- (present on admission)  Assessment & Plan  Treatment as noted above.  Symptomatic management for nausea and vomiting.  Continue IV fluid hydration.    Glaucoma- (present on admission)  Assessment & Plan  We will need to obtain information from the group home regarding her home medications and resume in the morning.    Hypothyroidism- (present on admission)  Assessment & Plan  -Restart Synthroid 50 mcg    Dementia (HCC)- (present on admission)  Assessment & Plan  -Appears that her dementia is quite advanced  -I am trying to get a hold the legal guardian to determine appropriate CODE STATUS and what her baseline is  -No feeding tube at this time.       VTE prophylaxis: heparin

## 2020-02-01 NOTE — DISCHARGE PLANNING
Hospital Care Management Discharge Planning       Anticipated Discharge Disposition:   · TBD     Action:   · This RN CM has left multiple VM for patient's legal guardian and her office.   · No return call at this time.   · Per patient chart review, Guardian consented to DNR code status.      Barriers to Discharge:   · Medical Clearance.   · Discharge disposition.   · Guardian Communication.      Plan:    · Continue to provide support services and assistance with discharge planning as needed.

## 2020-02-01 NOTE — PROGRESS NOTES
Received report from day shift RN. Pt is resting in bed and does not appear to be in any distress. Call light and personal belongings are within reach, bed in lowest locked position. POC addressed, white board updated. No further questions at this time.

## 2020-02-02 LAB
ANION GAP SERPL CALC-SCNC: 9 MMOL/L (ref 0–11.9)
BASOPHILS # BLD AUTO: 0.6 % (ref 0–1.8)
BASOPHILS # BLD: 0.05 K/UL (ref 0–0.12)
BUN SERPL-MCNC: 11 MG/DL (ref 8–22)
CALCIUM SERPL-MCNC: 8.7 MG/DL (ref 8.5–10.5)
CHLORIDE SERPL-SCNC: 108 MMOL/L (ref 96–112)
CO2 SERPL-SCNC: 25 MMOL/L (ref 20–33)
CREAT SERPL-MCNC: 0.85 MG/DL (ref 0.5–1.4)
EOSINOPHIL # BLD AUTO: 0.06 K/UL (ref 0–0.51)
EOSINOPHIL NFR BLD: 0.7 % (ref 0–6.9)
ERYTHROCYTE [DISTWIDTH] IN BLOOD BY AUTOMATED COUNT: 54.4 FL (ref 35.9–50)
GLUCOSE SERPL-MCNC: 130 MG/DL (ref 65–99)
HCT VFR BLD AUTO: 32.6 % (ref 37–47)
HGB BLD-MCNC: 10 G/DL (ref 12–16)
IMM GRANULOCYTES # BLD AUTO: 0.13 K/UL (ref 0–0.11)
IMM GRANULOCYTES NFR BLD AUTO: 1.6 % (ref 0–0.9)
LYMPHOCYTES # BLD AUTO: 2.17 K/UL (ref 1–4.8)
LYMPHOCYTES NFR BLD: 26.8 % (ref 22–41)
MCH RBC QN AUTO: 27.3 PG (ref 27–33)
MCHC RBC AUTO-ENTMCNC: 30.7 G/DL (ref 33.6–35)
MCV RBC AUTO: 89.1 FL (ref 81.4–97.8)
MONOCYTES # BLD AUTO: 1.13 K/UL (ref 0–0.85)
MONOCYTES NFR BLD AUTO: 14 % (ref 0–13.4)
NEUTROPHILS # BLD AUTO: 4.55 K/UL (ref 2–7.15)
NEUTROPHILS NFR BLD: 56.3 % (ref 44–72)
NRBC # BLD AUTO: 0 K/UL
NRBC BLD-RTO: 0 /100 WBC
PLATELET # BLD AUTO: 248 K/UL (ref 164–446)
PMV BLD AUTO: 8.9 FL (ref 9–12.9)
POTASSIUM SERPL-SCNC: 3.6 MMOL/L (ref 3.6–5.5)
RBC # BLD AUTO: 3.66 M/UL (ref 4.2–5.4)
SODIUM SERPL-SCNC: 142 MMOL/L (ref 135–145)
WBC # BLD AUTO: 8.1 K/UL (ref 4.8–10.8)

## 2020-02-02 PROCEDURE — 80048 BASIC METABOLIC PNL TOTAL CA: CPT

## 2020-02-02 PROCEDURE — A9270 NON-COVERED ITEM OR SERVICE: HCPCS | Performed by: INTERNAL MEDICINE

## 2020-02-02 PROCEDURE — 700111 HCHG RX REV CODE 636 W/ 250 OVERRIDE (IP): Performed by: HOSPITALIST

## 2020-02-02 PROCEDURE — 700101 HCHG RX REV CODE 250: Performed by: HOSPITALIST

## 2020-02-02 PROCEDURE — 770020 HCHG ROOM/CARE - TELE (206)

## 2020-02-02 PROCEDURE — 85025 COMPLETE CBC W/AUTO DIFF WBC: CPT

## 2020-02-02 PROCEDURE — 36415 COLL VENOUS BLD VENIPUNCTURE: CPT

## 2020-02-02 PROCEDURE — 99232 SBSQ HOSP IP/OBS MODERATE 35: CPT | Performed by: INTERNAL MEDICINE

## 2020-02-02 PROCEDURE — 700105 HCHG RX REV CODE 258: Performed by: HOSPITALIST

## 2020-02-02 PROCEDURE — 700105 HCHG RX REV CODE 258: Performed by: INTERNAL MEDICINE

## 2020-02-02 PROCEDURE — 700102 HCHG RX REV CODE 250 W/ 637 OVERRIDE(OP): Performed by: INTERNAL MEDICINE

## 2020-02-02 RX ADMIN — METRONIDAZOLE 500 MG: 500 INJECTION, SOLUTION INTRAVENOUS at 06:05

## 2020-02-02 RX ADMIN — METRONIDAZOLE 500 MG: 500 INJECTION, SOLUTION INTRAVENOUS at 16:28

## 2020-02-02 RX ADMIN — CEFTRIAXONE SODIUM 2 G: 2 INJECTION, POWDER, FOR SOLUTION INTRAMUSCULAR; INTRAVENOUS at 05:11

## 2020-02-02 RX ADMIN — HEPARIN SODIUM 5000 UNITS: 5000 INJECTION, SOLUTION INTRAVENOUS; SUBCUTANEOUS at 16:29

## 2020-02-02 RX ADMIN — HEPARIN SODIUM 5000 UNITS: 5000 INJECTION, SOLUTION INTRAVENOUS; SUBCUTANEOUS at 21:06

## 2020-02-02 RX ADMIN — LEVOTHYROXINE SODIUM 50 MCG: 50 TABLET ORAL at 05:11

## 2020-02-02 RX ADMIN — METRONIDAZOLE 500 MG: 500 INJECTION, SOLUTION INTRAVENOUS at 21:08

## 2020-02-02 RX ADMIN — HEPARIN SODIUM 5000 UNITS: 5000 INJECTION, SOLUTION INTRAVENOUS; SUBCUTANEOUS at 05:11

## 2020-02-02 RX ADMIN — DEXTROSE AND SODIUM CHLORIDE: 5; 900 INJECTION, SOLUTION INTRAVENOUS at 05:22

## 2020-02-02 NOTE — PROGRESS NOTES
Hospital Medicine Daily Progress Note    Date of Service  2/2/2020    Chief Complaint  69 y.o. female admitted 1/30/2020 with abdominal pain nausea vomiting and fevers    Hospital Course    See below      Interval Problem Update  Advanced dementia-legal guardian showed up yesterday.  She confirmed that she is DNR/DNI and that needs to be fed all meals.  Patient is actually eating today.    Fevers and nausea and vomiting-no recurrence of symptoms and white blood cell count is normalized and remains afebrile.    Consultants/Specialty  none    Code Status  fcfc    Disposition  Tele, eventually back to her [facility]O    Review of Systems  Review of Systems   Unable to perform ROS: Dementia        Physical Exam  Temp:  [36.6 °C (97.8 °F)-37.4 °C (99.4 °F)] 36.7 °C (98.1 °F)  Pulse:  [82-90] 82  Resp:  [16-17] 17  BP: ()/(59-85) 99/69  SpO2:  [91 %-95 %] 95 %    Physical Exam  Vitals signs and nursing note reviewed.   Constitutional:       General: She is not in acute distress.     Appearance: She is well-developed.      Comments: Patient is conversant, but is A&O x1, only knows her name  Pleasant   HENT:      Head: Normocephalic and atraumatic.      Mouth/Throat:      Pharynx: No oropharyngeal exudate.   Eyes:      Pupils: Pupils are equal, round, and reactive to light.   Neck:      Musculoskeletal: Normal range of motion and neck supple.      Thyroid: No thyromegaly.   Cardiovascular:      Rate and Rhythm: Normal rate and regular rhythm.      Heart sounds: Normal heart sounds. No murmur.   Pulmonary:      Effort: Pulmonary effort is normal. No respiratory distress.      Breath sounds: Normal breath sounds.   Abdominal:      General: Bowel sounds are normal.      Palpations: Abdomen is soft. There is no mass.      Tenderness: There is no tenderness. There is no guarding.      Hernia: No hernia is present.   Musculoskeletal: Normal range of motion.         General: No signs of injury.   Skin:     General: Skin is warm and  dry.      Coloration: Skin is pale.      Findings: No rash.   Neurological:      Mental Status: She is alert.      Cranial Nerves: No cranial nerve deficit.         Fluids    Intake/Output Summary (Last 24 hours) at 2/2/2020 1100  Last data filed at 2/1/2020 2040  Gross per 24 hour   Intake 120 ml   Output --   Net 120 ml       Laboratory  Recent Labs     01/30/20 2316 02/01/20 0243 02/02/20  0244   WBC 14.2* 10.8 8.1   RBC 4.02* 4.06* 3.66*   HEMOGLOBIN 11.1* 11.0* 10.0*   HEMATOCRIT 36.8* 36.1* 32.6*   MCV 91.5 88.9 89.1   MCH 27.6 27.1 27.3   MCHC 30.2* 30.5* 30.7*   RDW 57.2* 55.2* 54.4*   PLATELETCT 312 239 248   MPV 9.1 9.0 8.9*     Recent Labs     01/30/20 2316 02/01/20 0243 02/02/20 0244   SODIUM 141 137 142   POTASSIUM 3.7 3.7 3.6   CHLORIDE 105 104 108   CO2 24 24 25   GLUCOSE 140* 106* 130*   BUN 16 8 11   CREATININE 1.00 0.85 0.85   CALCIUM 9.1 9.1 8.7                   Imaging  CT-ABDOMEN-PELVIS WITH   Final Result      1.  No evidence of acute inflammatory process in the abdomen or pelvis   2.  Cholelithiasis   3.  Moderate-sized hiatal hernia   4.  Incompletely characterized LEFT renal lesion which may have a thick septation. Recommend further assessment with renal mass protocol CT or MRI when clinically appropriate   5.  Atherosclerosis   6.  Colonic diverticulosis                          Assessment/Plan  * Sepsis (HCC)- (present on admission)  Assessment & Plan  This is Sepsis Present on admission  SIRS criteria identified on my evaluation include: Tachycardia, with heart rate greater than 90 BPM and Leukocyosis, with WBC greater than 12,000  Source is suspect GI  Sepsis protocol initiated  Fluid resuscitation ordered per protocol  IV antibiotics as appropriate for source of sepsis  While organ dysfunction may be noted elsewhere in this problem list or in the chart, degree of organ dysfunction does not meet CMS criteria for severe sepsis    -Unclear etiology  -CT abdomen pelvis was relatively  unremarkable  -C. difficile colitis assay was negative  -No diarrhea, nausea and vomiting have improved, white blood cell count is normalized  -Continue empiric ceftriaxone and Flagyl, day 3, will do 5-day course  --UA is unremarkable and blood cultures are no growth to date    Nausea, vomiting, and diarrhea- (present on admission)  Assessment & Plan  Treatment as noted above.  Symptomatic management for nausea and vomiting.  Continue IV fluid hydration, but will decrease the rate as oral intake is improved    Glaucoma- (present on admission)  Assessment & Plan  We will need to obtain information from the group home regarding her home medications and resume in the morning.    Hypothyroidism- (present on admission)  Assessment & Plan  -Restart Synthroid 50 mcg    Dementia (HCC)- (present on admission)  Assessment & Plan  -Appears that her dementia is quite advanced  -She is confirmed DNR/DNI per her legal guardian  -Chest to be fed all meals therefore no feeding tube required       VTE prophylaxis: heparin

## 2020-02-02 NOTE — PROGRESS NOTES
Monitor summary: SR 77-93  R PVC, R PAC, O PAC   .14/.10/.38   Subjective   Patient ID: Samir is a 84 year old male.    Chief Complaint   Patient presents with   • Follow-up     HPI     Here for follow up of HTN.     His main complaint is of being tired all the time.  He gets up in the morning to eat and then goes back to bed.   He wakes to urinate and then can't go back to sleep.  Not sleeping well at night.   Finds that it is just too hard to get out of bed to urinate.  Just too tired.   Not SOB.  No chest pain.  He has no STRINGER.   He had a normal CBC last year and a normal TSH.      He is on cortisol for adrenal insufficiency.     He has gained over 20 pounds in the past 3 months.  He has been eating well.   Wife says that he eats a lot of sweets.           Samir has a past medical history of Hearing loss, sensorineural (11/20/2014).  Samir has Adrenal insufficiency due to corticosteroid withdrawal (CMS/HCC); Atherosclerotic heart disease of native coronary artery without angina pectoris; Current chronic use of systemic steroids; Current use of long term anticoagulation; Gross hematuria; History of pulmonary embolus (PE); Neural foraminal stenosis of lumbar spine; Urinary tract infection; Vitamin D deficiency; Hx of CABG; Hyperlipidemia; Hypertension; Malignant neoplasm of prostate (CMS/HCC); and Malignant melanoma of scalp and neck (CMS/HCC) on their problem list.  Samir has no past surgical history on file.  His family history is not on file.  Samir reports that he has never smoked. He has never used smokeless tobacco. He reports that he does not drink alcohol.  Samir has a current medication list which includes the following prescription(s): duloxetine, hydrocortisone, terazosin, metoprolol succinate, cyanocobalamin, warfarin, atorvastatin, folic acid, aspirin, cholecalciferol, tamsulosin, calcium carbonate-vitamin d, preservision areds 2, and acetaminophen.  Samir   Current Outpatient Medications   Medication Sig Dispense Refill   • DULoxetine (CYMBALTA) 30 MG  capsule Take 30 mg by mouth daily.     • hydrocortisone (CORTEF) 10 MG tablet Take 15 mg in the morning and 10 mg in the afternoon (with dinner). 235 tablet 3   • terazosin (HYTRIN) 10 MG capsule Take 1 capsule by mouth nightly. 90 capsule 3   • metoPROLOL succinate (TOPROL-XL) 25 MG 24 hr tablet Take 25 mg by mouth.     • cyanocobalamin 1000 MCG/ML injection Inject 1,000 mcg into the muscle every 30 days.     • warfarin (COUMADIN) 2 MG tablet Take 4 mg by mouth.     • atorvastatin (LIPITOR) 40 MG tablet      • folic acid (FOLATE) 400 MCG tablet      • aspirin 81 MG tablet Take 81 mg by mouth daily.     • cholecalciferol (VITAMIN D3) 1000 UNITS tablet Take 5,000 Units by mouth daily.     • tamsulosin (FLOMAX) 0.4 MG Cap Take 1 tablet by mouth daily.     • Calcium Carbonate-Vitamin D (CALCIUM 500 + D PO) Take 1 tablet by mouth daily.     • Multiple Vitamins-Minerals (PRESERVISION AREDS 2) Cap Take 1 tablet by mouth daily.     • Acetaminophen (TYLENOL EXTRA STRENGTH PO) Take 1 tablet by mouth as needed.       No current facility-administered medications for this visit.      Samir is allergic to bee sting and penicillins.    Review of Systems   Constitutional: Positive for fatigue. Negative for appetite change and fever.   Respiratory: Negative for shortness of breath.    Cardiovascular: Negative for chest pain.   Genitourinary: Negative for difficulty urinating.       Objective   Physical Exam   Constitutional: He is oriented to person, place, and time. He appears well-nourished. No distress.   Cardiovascular: Normal rate, regular rhythm, normal heart sounds and normal pulses. Exam reveals no gallop and no friction rub.   No murmur heard.  Pulmonary/Chest: Effort normal and breath sounds normal.   Neurological: He is alert and oriented to person, place, and time.   Skin: Skin is warm and dry.   Psychiatric: He has a normal mood and affect.   Vitals reviewed.      Assessment   Problem List Items Addressed This Visit         Endocrine    Adrenal insufficiency due to corticosteroid withdrawal (CMS/HCC) - Primary    Relevant Medications    hydrocortisone (CORTEF) 10 MG tablet    Other Relevant Orders    COMPREHENSIVE METABOLIC PANEL    CBC & AUTO DIFFERENTIAL      Other Visit Diagnoses     Fatigue, unspecified type        Relevant Orders    COMPREHENSIVE METABOLIC PANEL    CBC & AUTO DIFFERENTIAL    THYROID STIMULATING HORMONE           HTN is controlled.    New issue of profound tiredness.  Could be related to poor and insufficient sleep.  Could be related to cortisol deficiency.   Could be contributed to be chronic pain.     Please increase the hydrocortisone from 15 mg in the morning and add a 10 mg pill in the afternoon.  Return in 2 weeks.    I will check his BG to rule out DM.  He has gained over 20 pounds since last OV.     CBC, CMP, TSH.

## 2020-02-02 NOTE — PALLIATIVE CARE
Palliative Care follow-up  Met with Dr Villeda. MD has been able to speak with pts guardian. No need for Palliative Consult at this time. Pt to return to  once medically cleared. Will cancel consult order.     Guardian Mary Jane Garciaers [phone]   Linda El [phone]    Pt lives at [facility] [phone]    Thank you for allowing Palliative Care to support this patient and family. Contact [phone] for additional assistance, change in patient status, or with any questions/concerns.

## 2020-02-02 NOTE — PROGRESS NOTES
Received report from day shift RN, Sandra. Pt is resting in bed and does not appear to be in any distress. Call light and personal belongings are within reach, bed in lowest locked position. POC addressed, white board updated. No further questions at this time.

## 2020-02-02 NOTE — PROGRESS NOTES
Ana, a caregiver from patients group home, stopped by to feed and check on patient. Ana verified that patients current mentation and confusion is normal. Ana stated that once patient is medically cleared the group home will be happy to take her back as long as transportation is provided. Ana will be out of town but gave contact information for guardian, caregivers, and  for patient. Contact info is as follows. Patient became more alert when visiting with caregiver and ate 75% of dinner.     State Guardian: Mary Jane Salvador   Work: [phone]   Cell: [phone]    : Linda El   Cell: [phone]    [facility] Home: [facility]   [facility]  [address] [address]  [phone]

## 2020-02-03 ENCOUNTER — PATIENT OUTREACH (OUTPATIENT)
Dept: HEALTH INFORMATION MANAGEMENT | Facility: OTHER | Age: 70
End: 2020-02-03

## 2020-02-03 VITALS
BODY MASS INDEX: 27.7 KG/M2 | HEIGHT: 63 IN | DIASTOLIC BLOOD PRESSURE: 66 MMHG | HEART RATE: 77 BPM | WEIGHT: 156.31 LBS | SYSTOLIC BLOOD PRESSURE: 114 MMHG | RESPIRATION RATE: 16 BRPM | OXYGEN SATURATION: 92 % | TEMPERATURE: 97.9 F

## 2020-02-03 PROBLEM — R11.2 NAUSEA, VOMITING, AND DIARRHEA: Status: RESOLVED | Noted: 2020-01-31 | Resolved: 2020-02-03

## 2020-02-03 PROBLEM — A41.9 SEPSIS (HCC): Status: RESOLVED | Noted: 2020-01-31 | Resolved: 2020-02-03

## 2020-02-03 PROBLEM — R19.7 NAUSEA, VOMITING, AND DIARRHEA: Status: RESOLVED | Noted: 2020-01-31 | Resolved: 2020-02-03

## 2020-02-03 LAB
ANION GAP SERPL CALC-SCNC: 8 MMOL/L (ref 0–11.9)
BUN SERPL-MCNC: 14 MG/DL (ref 8–22)
CALCIUM SERPL-MCNC: 8.7 MG/DL (ref 8.5–10.5)
CHLORIDE SERPL-SCNC: 110 MMOL/L (ref 96–112)
CO2 SERPL-SCNC: 23 MMOL/L (ref 20–33)
CREAT SERPL-MCNC: 0.66 MG/DL (ref 0.5–1.4)
GLUCOSE SERPL-MCNC: 114 MG/DL (ref 65–99)
POTASSIUM SERPL-SCNC: 3.4 MMOL/L (ref 3.6–5.5)
SODIUM SERPL-SCNC: 141 MMOL/L (ref 135–145)

## 2020-02-03 PROCEDURE — 700102 HCHG RX REV CODE 250 W/ 637 OVERRIDE(OP): Performed by: INTERNAL MEDICINE

## 2020-02-03 PROCEDURE — 36415 COLL VENOUS BLD VENIPUNCTURE: CPT

## 2020-02-03 PROCEDURE — A9270 NON-COVERED ITEM OR SERVICE: HCPCS | Performed by: INTERNAL MEDICINE

## 2020-02-03 PROCEDURE — 99239 HOSP IP/OBS DSCHRG MGMT >30: CPT | Performed by: INTERNAL MEDICINE

## 2020-02-03 PROCEDURE — 700105 HCHG RX REV CODE 258: Performed by: HOSPITALIST

## 2020-02-03 PROCEDURE — 700101 HCHG RX REV CODE 250: Performed by: HOSPITALIST

## 2020-02-03 PROCEDURE — 80048 BASIC METABOLIC PNL TOTAL CA: CPT

## 2020-02-03 PROCEDURE — 700111 HCHG RX REV CODE 636 W/ 250 OVERRIDE (IP): Performed by: HOSPITALIST

## 2020-02-03 RX ORDER — CIPROFLOXACIN 500 MG/1
500 TABLET, FILM COATED ORAL 2 TIMES DAILY
Qty: 4 TAB | Refills: 0 | Status: SHIPPED | OUTPATIENT
Start: 2020-02-03 | End: 2020-02-05

## 2020-02-03 RX ORDER — METRONIDAZOLE 500 MG/1
500 TABLET ORAL EVERY 8 HOURS
Qty: 6 TAB | Refills: 0 | Status: SHIPPED | OUTPATIENT
Start: 2020-02-03 | End: 2020-02-05

## 2020-02-03 RX ORDER — METRONIDAZOLE 500 MG/1
500 TABLET ORAL EVERY 8 HOURS
Status: DISCONTINUED | OUTPATIENT
Start: 2020-02-03 | End: 2020-02-03 | Stop reason: HOSPADM

## 2020-02-03 RX ADMIN — LEVOTHYROXINE SODIUM 50 MCG: 50 TABLET ORAL at 06:11

## 2020-02-03 RX ADMIN — METRONIDAZOLE 500 MG: 500 TABLET ORAL at 16:08

## 2020-02-03 RX ADMIN — METRONIDAZOLE 500 MG: 500 INJECTION, SOLUTION INTRAVENOUS at 06:27

## 2020-02-03 RX ADMIN — CEFTRIAXONE SODIUM 2 G: 2 INJECTION, POWDER, FOR SOLUTION INTRAMUSCULAR; INTRAVENOUS at 06:10

## 2020-02-03 RX ADMIN — HEPARIN SODIUM 5000 UNITS: 5000 INJECTION, SOLUTION INTRAVENOUS; SUBCUTANEOUS at 06:11

## 2020-02-03 ASSESSMENT — COGNITIVE AND FUNCTIONAL STATUS - GENERAL
EATING MEALS: TOTAL
WALKING IN HOSPITAL ROOM: TOTAL
HELP NEEDED FOR BATHING: A LOT
MOVING FROM LYING ON BACK TO SITTING ON SIDE OF FLAT BED: UNABLE
STANDING UP FROM CHAIR USING ARMS: TOTAL
MOBILITY SCORE: 8
PERSONAL GROOMING: TOTAL
TOILETING: A LOT
DAILY ACTIVITIY SCORE: 10
DRESSING REGULAR UPPER BODY CLOTHING: A LOT
DRESSING REGULAR LOWER BODY CLOTHING: A LOT
MOVING TO AND FROM BED TO CHAIR: A LOT
TURNING FROM BACK TO SIDE WHILE IN FLAT BAD: A LOT
SUGGESTED CMS G CODE MODIFIER MOBILITY: CM
SUGGESTED CMS G CODE MODIFIER DAILY ACTIVITY: CL
CLIMB 3 TO 5 STEPS WITH RAILING: TOTAL

## 2020-02-03 NOTE — DISCHARGE SUMMARY
Discharge Summary    CHIEF COMPLAINT ON ADMISSION  Chief Complaint   Patient presents with   • Abdominal Pain   • N/V       Reason for Admission  EMS     Admission Date  1/30/2020    CODE STATUS  DNAR/DNI    HPI & HOSPITAL COURSE  This is a 69 y.o. female here with .above medical issues.  Patient presented with abdominal pain nausea vomiting and fevers and sepsis.  She has advanced Alzheimer's dementia at baseline and is a DNR/DNI and has a legal guardian in place.    Patient was admitted to the telemetry unit and placed on sepsis protocol and placed on ciprofloxacin and Flagyl.  Her C. difficile assay was negative.  Her CT abdomen pelvis showed no evidence of acute abnormality.  White blood cell count normalized and her fevers dissipated and she remained afebrile.    She was able to eat with assistance from our staff and she is now medically stable to go back to her group home and finished a few more days of antibiotics at home.    She was incidentally noted to have a left renal lesion that will need outpatient CT or MRI imaging to fully characterize in the next 2 to 3 months and this can be followed with her primary care physician.     See below     Therefore, she is discharged in fair and stable condition to home with close outpatient follow-up.    The patient met 2-midnight criteria for an inpatient stay at the time of discharge.    Discharge Date  February 3, 2012    FOLLOW UP ITEMS POST DISCHARGE  Follow-up with primary care physician 1 to 2-week    DISCHARGE DIAGNOSES  Principal Problem (Resolved):    Sepsis (HCC) POA: Yes  Active Problems:    Dementia (HCC) POA: Yes    Hypothyroidism POA: Yes    Glaucoma POA: Yes  Resolved Problems:    Nausea, vomiting, and diarrhea POA: Yes      FOLLOW UP  No future appointments.  Noni Velásquez M.D.  [address] [address]  [facility] [address]  [phone]    Schedule an appointment as soon as possible for a visit  Please call to schedule a hospital follow up after discharge.  Thank you!      MEDICATIONS ON DISCHARGE     Medication List      START taking these medications      Instructions   ciprofloxacin 500 MG Tabs  Commonly known as:  CIPRO   Take 1 Tab by mouth 2 times a day for 2 days.  Dose:  500 mg     metroNIDAZOLE 500 MG Tabs  Commonly known as:  FLAGYL   Take 1 Tab by mouth every 8 hours for 2 days.  Dose:  500 mg        CONTINUE taking these medications      Instructions   aspirin 81 MG tablet   Take 81 mg by mouth every day.  Dose:  81 mg     atorvastatin 20 MG Tabs  Commonly known as:  LIPITOR   Take 20 mg by mouth every evening.  Dose:  20 mg     citalopram 20 MG Tabs  Commonly known as:  CELEXA   Take 20 mg by mouth every day.  Dose:  20 mg     divalproex 125 MG Csdr  Commonly known as:  DEPAKOTE SPRINKLE   Take 125 mg by mouth 2 Times a Day.  Dose:  125 mg     gemfibrozil 600 MG Tabs  Commonly known as:  LOPID   Take 300 mg by mouth 2 Times a Day.  Dose:  300 mg     KlonoPIN 2 MG tablet  Generic drug:  clonazepam   Take 1 mg by mouth every bedtime.  Dose:  1 mg     levothyroxine 50 MCG Tabs  Commonly known as:  SYNTHROID   Take 50 mcg by mouth Every morning on an empty stomach.  Dose:  50 mcg     memantine 5 MG Tabs  Commonly known as:  Namenda   Take 2 Tabs by mouth 2 times a day.  Dose:  10 mg     mirtazapine 15 MG Tabs  Commonly known as:  REMERON   Take 15 mg by mouth every evening.  Dose:  15 mg     polyethylene glycol/lytes Pack  Commonly known as:  MIRALAX   Take 17 g by mouth every day.  Dose:  17 g     potassium chloride SA 10 MEQ Tbcr  Commonly known as:  K-DUR   Take 10 mEq by mouth 2 times a day.  Dose:  10 mEq     QUEtiapine 100 MG Tabs  Commonly known as:  SEROQUEL   Take 100 mg by mouth 2 times a day.  Dose:  100 mg     raloxifene 60 MG Tabs  Commonly known as:  EVISTA   Take 60 mg by mouth every day.  Dose:  60 mg     * travoprost 0.004 % Soln  Commonly known as:  TRAVATAN Z   Place 1 Drop in both eyes every evening.  Dose:  1 Drop     * travoprost 0.004 %  Soln  Commonly known as:  TRAVATAN Z   Place 1 Drop in both eyes every evening.  Dose:  1 Drop     traZODone 50 MG Tabs  Commonly known as:  DESYREL   Take 50 mg by mouth every evening.  Dose:  50 mg     Vitamin C 500 MG Caps   Take  by mouth.     Zantac 150 MG Tabs  Generic drug:  raNITidine   Take 150 mg by mouth 2 times a day.  Dose:  150 mg         * This list has 2 medication(s) that are the same as other medications prescribed for you. Read the directions carefully, and ask your doctor or other care provider to review them with you.            STOP taking these medications    Aleve 220 MG Caps  Generic drug:  Naproxen Sodium     celecoxib 100 MG Caps  Commonly known as:  CELEBREX     doxycycline 100 MG capsule  Commonly known as:  MONODOX     furosemide 20 MG Tabs  Commonly known as:  LASIX     Pataday 0.2 % ophthalmic solution  Generic drug:  olopatadine HCl     SENNA            Allergies  Allergies   Allergen Reactions   • Ampicillin Rash       DIET  Orders Placed This Encounter   Procedures   • Diet Order Regular     Standing Status:   Standing     Number of Occurrences:   1     Order Specific Question:   Diet:     Answer:   Regular [1]       ACTIVITY  As tolerated.  Weight bearing as tolerated    CONSULTATIONS  NONE    PROCEDURES  CT-ABDOMEN-PELVIS WITH   Final Result      1.  No evidence of acute inflammatory process in the abdomen or pelvis   2.  Cholelithiasis   3.  Moderate-sized hiatal hernia   4.  Incompletely characterized LEFT renal lesion which may have a thick septation. Recommend further assessment with renal mass protocol CT or MRI when clinically appropriate   5.  Atherosclerosis   6.  Colonic diverticulosis                           LABORATORY  Lab Results   Component Value Date    SODIUM 141 02/03/2020    POTASSIUM 3.4 (L) 02/03/2020    CHLORIDE 110 02/03/2020    CO2 23 02/03/2020    GLUCOSE 114 (H) 02/03/2020    BUN 14 02/03/2020    CREATININE 0.66 02/03/2020        Lab Results   Component  Value Date    WBC 8.1 02/02/2020    HEMOGLOBIN 10.0 (L) 02/02/2020    HEMATOCRIT 32.6 (L) 02/02/2020    PLATELETCT 248 02/02/2020        Total time of the discharge process exceeds 32 minutes.

## 2020-02-03 NOTE — CARE PLAN
Problem: Safety  Goal: Will remain free from injury  Outcome: PROGRESSING AS EXPECTED  Note:   Pt is confused, but does not attempt to get out of the bed; side rails up x2; bed alarm on; door open to view; call bell within reach     Problem: Communication  Goal: The ability to communicate needs accurately and effectively will improve  Outcome: PROGRESSING SLOWER THAN EXPECTED  Note:   Pt is confused; frequent reorientation of where the pt is located; and basic orientation questions.

## 2020-02-03 NOTE — DISCHARGE PLANNING
Anticipated Discharge Disposition:   · LTC at group [facility] L&N: [address] [id number] [phone]    Action:   · LSW spoke with pt's , Linda regarding discharge back to group home. She is agreeable with plan and reported group home is aware and ready to accept pt back. Pt will need to be transported by [facility]. She is a max assist and unable to follow cue/direction d/t dx of dementia.  with guardian agreeable to [facility] transport.   · Transportation form/[facility] PCA completed and faxed to [facility] ext [phone]    Barriers to Discharge:   · Transportation set up    Plan:   · Care coordination will continue to follow up and provide assistance with discharge plans/barriers.

## 2020-02-03 NOTE — PROGRESS NOTES
Bedside report received, assumed pt care@2721. Pt A&Ox1, oriented to self at times. Pt denies any pain. Pt resting in bed comfortably. POC discussed with guardian over the phone. Pt to be discharged home today. Pt incontinent of stool; full bed linen change performed. Pt max x2 assist to edge of bed, pt unable to sit up on edge by self. Call light within reach

## 2020-02-03 NOTE — DISCHARGE PLANNING
Anticipated Discharge Disposition:   · LTC at group home L&N: [address] [id number] [phone]    Action:   · Transport scheduled for 1600 today with [facility].   · BSN updated.   · LSW notified  Linda of transportation time. She voiced agreement with plan.     Barriers to Discharge:   · None    Plan:   · Pt to discharge home to group home today via [facility] transport at 1600

## 2020-02-03 NOTE — DISCHARGE PLANNING
Received Transport Form @ 9308  Spoke to Gisele BARCENAS    Transport is scheduled for 2/3/2020 @1600 going to [facility].

## 2020-02-03 NOTE — PROGRESS NOTES
Assumed care for the pt; received report; plan of care reviewed with the pt; will continue to monitor the pt.

## 2020-02-03 NOTE — DISCHARGE INSTRUCTIONS
Discharge Instructions    Discharged to group home by car with escort. Discharged via wheelchair, hospital escort: Refused.  Special equipment needed: Not Applicable    Be sure to schedule a follow-up appointment with your primary care doctor or any specialists as instructed.     Discharge Plan:   Influenza Vaccine Indication: Not indicated: Previously immunized this influenza season and > 8 years of age    I understand that a diet low in cholesterol, fat, and sodium is recommended for good health. Unless I have been given specific instructions below for another diet, I accept this instruction as my diet prescription.     Special Instructions: Sepsis, Adult  Sepsis is a serious infection of your blood or tissues that affects your whole body. The infection that causes sepsis may be bacterial, viral, fungal, or parasitic. Sepsis may be life threatening. Sepsis can cause your blood pressure to drop. This may result in shock. Shock causes your central nervous system and your organs to stop working correctly.  What increases the risk?  Sepsis can happen in anyone, but it is more likely to happen in people who have weakened immune systems.  What are the signs or symptoms?  Symptoms of sepsis can include:  · Fever or low body temperature (hypothermia).  · Rapid breathing (hyperventilation).  · Chills.  · Rapid heartbeat (tachycardia).  · Confusion or light-headedness.  · Trouble breathing.  · Urinating much less than usual.  · Cool, clammy skin or red, flushed skin.  · Other problems with the heart, kidneys, or brain.  How is this diagnosed?  Your health care provider will likely do tests to look for an infection, to see if the infection has spread to your blood, and to see how serious your condition is. Tests can include:  · Blood tests, including cultures of your blood.  · Cultures of other fluids from your body, such as:  ¨ Urine.  ¨ Pus from wounds.  ¨ Mucus coughed up from your lungs.  · Urine tests other than  cultures.  · X-ray exams or other imaging tests.  How is this treated?  Treatment will begin with elimination of the source of infection. If your sepsis is likely caused by a bacterial or fungal infection, you will be given antibiotic or antifungal medicines.  You may also receive:  · Oxygen.  · Fluids through an IV tube.  · Medicines to increase your blood pressure.  · A machine to clean your blood (dialysis) if your kidneys fail.  · A machine to help you breathe if your lungs fail.  Get help right away if:  You get an infection or develop any of the signs and symptoms of sepsis after surgery or a hospitalization.  This information is not intended to replace advice given to you by your health care provider. Make sure you discuss any questions you have with your health care provider.  Document Released: 09/15/2004 Document Revised: 05/25/2017 Document Reviewed: 08/25/2014  [employer] Patient Education © 2017 [employer]      · Is patient discharged on Warfarin / Coumadin?   No     Depression / Suicide Risk    As you are discharged from this [facility], it is important to learn how to keep safe from harming yourself.    Recognize the warning signs:  · Abrupt changes in personality, positive or negative- including increase in energy   · Giving away possessions  · Change in eating patterns- significant weight changes-  positive or negative  · Change in sleeping patterns- unable to sleep or sleeping all the time   · Unwillingness or inability to communicate  · Depression  · Unusual sadness, discouragement and loneliness  · Talk of wanting to die  · Neglect of personal appearance   · Rebelliousness- reckless behavior  · Withdrawal from people/activities they love  · Confusion- inability to concentrate     If you or a loved one observes any of these behaviors or has concerns about self-harm, here's what you can do:  · Talk about it- your feelings and reasons for harming yourself  · Remove any means  that you might use to hurt yourself (examples: pills, rope, extension cords, firearm)  · Get professional help from the community (Mental Health, Substance Abuse, psychological counseling)  · Do not be alone:Call your Safe Contact- someone whom you trust who will be there for you.  · Call your local CRISIS HOTLINE 345-0129 or 974-802-6952  · Call your local Children's Mobile Crisis Response Team [place] (574) 842-6276 or www.NearWoo  · Call the toll free National Suicide Prevention Hotlines   · National Suicide Prevention Lifeline 638-971-SSYR (0539)  · National Hope Line Network 800-SUICIDE (075-6861)

## 2020-02-04 NOTE — PROGRESS NOTES
Pt discharged to group home. NARINDER at bedside to get pt. Discharge instructions discussed over the phone with caretaker Demetrice @0720, she verbalized understanding and denied questions. Pt prescriptions resent to preferred pharmacy flying [facility] verified with Demetrice; per Dr Tabares ok for pt to start taking tomorrow 2/4. Pt guardian at [facility] notified of pts discharge home. Pt had no bedside belongings at hospital.

## 2020-02-06 LAB
BACTERIA BLD CULT: NORMAL
BACTERIA BLD CULT: NORMAL
SIGNIFICANT IND 70042: NORMAL
SIGNIFICANT IND 70042: NORMAL
SITE SITE: NORMAL
SITE SITE: NORMAL
SOURCE SOURCE: NORMAL
SOURCE SOURCE: NORMAL

## 2020-05-17 ENCOUNTER — APPOINTMENT (OUTPATIENT)
Dept: RADIOLOGY | Facility: MEDICAL CENTER | Age: 70
DRG: 871 | End: 2020-05-17
Attending: EMERGENCY MEDICINE
Payer: MEDICARE

## 2020-05-17 ENCOUNTER — HOSPITAL ENCOUNTER (INPATIENT)
Facility: MEDICAL CENTER | Age: 70
LOS: 9 days | DRG: 871 | End: 2020-05-27
Attending: EMERGENCY MEDICINE | Admitting: INTERNAL MEDICINE
Payer: MEDICARE

## 2020-05-17 LAB
ALBUMIN SERPL BCP-MCNC: 3.5 G/DL (ref 3.2–4.9)
ALBUMIN/GLOB SERPL: 1.2 G/DL
ALP SERPL-CCNC: 88 U/L (ref 30–99)
ALT SERPL-CCNC: 22 U/L (ref 2–50)
ANION GAP SERPL CALC-SCNC: 17 MMOL/L (ref 7–16)
ANISOCYTOSIS BLD QL SMEAR: ABNORMAL
APPEARANCE UR: CLEAR
AST SERPL-CCNC: 67 U/L (ref 12–45)
BACTERIA #/AREA URNS HPF: ABNORMAL /HPF
BASOPHILS # BLD AUTO: 0.4 % (ref 0–1.8)
BASOPHILS # BLD: 0.07 K/UL (ref 0–0.12)
BILIRUB SERPL-MCNC: 0.4 MG/DL (ref 0.1–1.5)
BILIRUB UR QL STRIP.AUTO: NEGATIVE
BUN SERPL-MCNC: 11 MG/DL (ref 8–22)
CALCIUM SERPL-MCNC: 9.2 MG/DL (ref 8.5–10.5)
CHLORIDE SERPL-SCNC: 103 MMOL/L (ref 96–112)
CO2 SERPL-SCNC: 19 MMOL/L (ref 20–33)
COLOR UR: YELLOW
COMMENT 1642: NORMAL
CREAT SERPL-MCNC: 0.91 MG/DL (ref 0.5–1.4)
DACRYOCYTES BLD QL SMEAR: NORMAL
EOSINOPHIL # BLD AUTO: 0.01 K/UL (ref 0–0.51)
EOSINOPHIL NFR BLD: 0.1 % (ref 0–6.9)
EPI CELLS #/AREA URNS HPF: NEGATIVE /HPF
ERYTHROCYTE [DISTWIDTH] IN BLOOD BY AUTOMATED COUNT: 54.4 FL (ref 35.9–50)
GLOBULIN SER CALC-MCNC: 2.9 G/DL (ref 1.9–3.5)
GLUCOSE SERPL-MCNC: 186 MG/DL (ref 65–99)
GLUCOSE UR STRIP.AUTO-MCNC: NEGATIVE MG/DL
HCT VFR BLD AUTO: 31.4 % (ref 37–47)
HGB BLD-MCNC: 8.7 G/DL (ref 12–16)
HYALINE CASTS #/AREA URNS LPF: ABNORMAL /LPF
HYPOCHROMIA BLD QL SMEAR: ABNORMAL
IMM GRANULOCYTES # BLD AUTO: 0.09 K/UL (ref 0–0.11)
IMM GRANULOCYTES NFR BLD AUTO: 0.5 % (ref 0–0.9)
KETONES UR STRIP.AUTO-MCNC: ABNORMAL MG/DL
LEUKOCYTE ESTERASE UR QL STRIP.AUTO: ABNORMAL
LIPASE SERPL-CCNC: 20 U/L (ref 11–82)
LYMPHOCYTES # BLD AUTO: 1.64 K/UL (ref 1–4.8)
LYMPHOCYTES NFR BLD: 9.6 % (ref 22–41)
MCH RBC QN AUTO: 22.7 PG (ref 27–33)
MCHC RBC AUTO-ENTMCNC: 27.7 G/DL (ref 33.6–35)
MCV RBC AUTO: 81.8 FL (ref 81.4–97.8)
MICRO URNS: ABNORMAL
MICROCYTES BLD QL SMEAR: ABNORMAL
MONOCYTES # BLD AUTO: 1.1 K/UL (ref 0–0.85)
MONOCYTES NFR BLD AUTO: 6.5 % (ref 0–13.4)
MORPHOLOGY BLD-IMP: NORMAL
MUCOUS THREADS #/AREA URNS HPF: ABNORMAL /HPF
NEUTROPHILS # BLD AUTO: 14.12 K/UL (ref 2–7.15)
NEUTROPHILS NFR BLD: 82.9 % (ref 44–72)
NITRITE UR QL STRIP.AUTO: POSITIVE
NRBC # BLD AUTO: 0 K/UL
NRBC BLD-RTO: 0 /100 WBC
OVALOCYTES BLD QL SMEAR: NORMAL
PH UR STRIP.AUTO: 5.5 [PH] (ref 5–8)
PLATELET # BLD AUTO: 307 K/UL (ref 164–446)
PLATELET BLD QL SMEAR: NORMAL
PMV BLD AUTO: 9.3 FL (ref 9–12.9)
POIKILOCYTOSIS BLD QL SMEAR: NORMAL
POLYCHROMASIA BLD QL SMEAR: NORMAL
POTASSIUM SERPL-SCNC: 4.8 MMOL/L (ref 3.6–5.5)
PROT SERPL-MCNC: 6.4 G/DL (ref 6–8.2)
PROT UR QL STRIP: 30 MG/DL
RBC # BLD AUTO: 3.84 M/UL (ref 4.2–5.4)
RBC # URNS HPF: ABNORMAL /HPF
RBC BLD AUTO: PRESENT
RBC UR QL AUTO: ABNORMAL
SODIUM SERPL-SCNC: 139 MMOL/L (ref 135–145)
SP GR UR STRIP.AUTO: 1.02
UROBILINOGEN UR STRIP.AUTO-MCNC: 1 MG/DL
WBC # BLD AUTO: 17 K/UL (ref 4.8–10.8)
WBC #/AREA URNS HPF: ABNORMAL /HPF

## 2020-05-17 PROCEDURE — 99285 EMERGENCY DEPT VISIT HI MDM: CPT

## 2020-05-17 PROCEDURE — 83690 ASSAY OF LIPASE: CPT

## 2020-05-17 PROCEDURE — 36415 COLL VENOUS BLD VENIPUNCTURE: CPT

## 2020-05-17 PROCEDURE — 80053 COMPREHEN METABOLIC PANEL: CPT

## 2020-05-17 PROCEDURE — C9803 HOPD COVID-19 SPEC COLLECT: HCPCS | Performed by: EMERGENCY MEDICINE

## 2020-05-17 PROCEDURE — 85025 COMPLETE CBC W/AUTO DIFF WBC: CPT

## 2020-05-17 PROCEDURE — 87040 BLOOD CULTURE FOR BACTERIA: CPT

## 2020-05-17 PROCEDURE — 85610 PROTHROMBIN TIME: CPT

## 2020-05-17 PROCEDURE — 81001 URINALYSIS AUTO W/SCOPE: CPT

## 2020-05-17 PROCEDURE — 700111 HCHG RX REV CODE 636 W/ 250 OVERRIDE (IP): Performed by: EMERGENCY MEDICINE

## 2020-05-17 PROCEDURE — 71045 X-RAY EXAM CHEST 1 VIEW: CPT

## 2020-05-17 PROCEDURE — 96375 TX/PRO/DX INJ NEW DRUG ADDON: CPT

## 2020-05-17 RX ORDER — ONDANSETRON 2 MG/ML
4 INJECTION INTRAMUSCULAR; INTRAVENOUS ONCE
Status: COMPLETED | OUTPATIENT
Start: 2020-05-17 | End: 2020-05-17

## 2020-05-17 RX ORDER — SODIUM CHLORIDE 9 MG/ML
1000 INJECTION, SOLUTION INTRAVENOUS ONCE
Status: COMPLETED | OUTPATIENT
Start: 2020-05-18 | End: 2020-05-18

## 2020-05-17 RX ORDER — AZITHROMYCIN 500 MG/1
500 INJECTION, POWDER, LYOPHILIZED, FOR SOLUTION INTRAVENOUS ONCE
Status: COMPLETED | OUTPATIENT
Start: 2020-05-18 | End: 2020-05-18

## 2020-05-17 RX ADMIN — ONDANSETRON 4 MG: 2 INJECTION INTRAMUSCULAR; INTRAVENOUS at 23:19

## 2020-05-17 ASSESSMENT — FIBROSIS 4 INDEX: FIB4 SCORE: 1.29

## 2020-05-18 PROBLEM — J18.9 CAP (COMMUNITY ACQUIRED PNEUMONIA): Status: ACTIVE | Noted: 2020-05-18

## 2020-05-18 LAB
COVID ORDER STATUS COVID19: NORMAL
INR PPP: 1.01 (ref 0.87–1.13)
LACTATE BLD-SCNC: 3 MMOL/L (ref 0.5–2)
LACTATE BLD-SCNC: 3.3 MMOL/L (ref 0.5–2)
LACTATE BLD-SCNC: 3.8 MMOL/L (ref 0.5–2)
LACTATE BLD-SCNC: 3.9 MMOL/L (ref 0.5–2)
PROCALCITONIN SERPL-MCNC: 0.11 NG/ML
PROTHROMBIN TIME: 13.5 SEC (ref 12–14.6)
SARS-COV-2 RNA RESP QL NAA+PROBE: NOTDETECTED
SPECIMEN SOURCE: NORMAL
TSH SERPL DL<=0.005 MIU/L-ACNC: 1.21 UIU/ML (ref 0.38–5.33)

## 2020-05-18 PROCEDURE — 87040 BLOOD CULTURE FOR BACTERIA: CPT

## 2020-05-18 PROCEDURE — 700111 HCHG RX REV CODE 636 W/ 250 OVERRIDE (IP): Performed by: EMERGENCY MEDICINE

## 2020-05-18 PROCEDURE — 700102 HCHG RX REV CODE 250 W/ 637 OVERRIDE(OP): Performed by: INTERNAL MEDICINE

## 2020-05-18 PROCEDURE — 84145 PROCALCITONIN (PCT): CPT

## 2020-05-18 PROCEDURE — 700105 HCHG RX REV CODE 258: Performed by: INTERNAL MEDICINE

## 2020-05-18 PROCEDURE — 700111 HCHG RX REV CODE 636 W/ 250 OVERRIDE (IP): Performed by: INTERNAL MEDICINE

## 2020-05-18 PROCEDURE — 84443 ASSAY THYROID STIM HORMONE: CPT

## 2020-05-18 PROCEDURE — A9270 NON-COVERED ITEM OR SERVICE: HCPCS | Performed by: INTERNAL MEDICINE

## 2020-05-18 PROCEDURE — 700105 HCHG RX REV CODE 258: Performed by: EMERGENCY MEDICINE

## 2020-05-18 PROCEDURE — 700102 HCHG RX REV CODE 250 W/ 637 OVERRIDE(OP): Performed by: HOSPITALIST

## 2020-05-18 PROCEDURE — 96367 TX/PROPH/DG ADDL SEQ IV INF: CPT

## 2020-05-18 PROCEDURE — A9270 NON-COVERED ITEM OR SERVICE: HCPCS | Performed by: HOSPITALIST

## 2020-05-18 PROCEDURE — 770020 HCHG ROOM/CARE - TELE (206)

## 2020-05-18 PROCEDURE — 99223 1ST HOSP IP/OBS HIGH 75: CPT | Mod: AI | Performed by: INTERNAL MEDICINE

## 2020-05-18 PROCEDURE — 83605 ASSAY OF LACTIC ACID: CPT | Mod: 91

## 2020-05-18 PROCEDURE — U0004 COV-19 TEST NON-CDC HGH THRU: HCPCS

## 2020-05-18 PROCEDURE — 96365 THER/PROPH/DIAG IV INF INIT: CPT

## 2020-05-18 RX ORDER — TRAVOPROST OPHTHALMIC SOLUTION 0.04 MG/ML
1 SOLUTION OPHTHALMIC EVERY EVENING
Status: DISCONTINUED | OUTPATIENT
Start: 2020-05-18 | End: 2020-05-18

## 2020-05-18 RX ORDER — RALOXIFENE HYDROCHLORIDE 60 MG/1
60 TABLET, FILM COATED ORAL DAILY
Status: DISCONTINUED | OUTPATIENT
Start: 2020-05-18 | End: 2020-05-27 | Stop reason: HOSPADM

## 2020-05-18 RX ORDER — RANITIDINE 150 MG/1
150 TABLET ORAL 2 TIMES DAILY
Status: DISCONTINUED | OUTPATIENT
Start: 2020-05-18 | End: 2020-05-18

## 2020-05-18 RX ORDER — BISACODYL 10 MG
10 SUPPOSITORY, RECTAL RECTAL
Status: DISCONTINUED | OUTPATIENT
Start: 2020-05-18 | End: 2020-05-27 | Stop reason: HOSPADM

## 2020-05-18 RX ORDER — TRAZODONE HYDROCHLORIDE 100 MG/1
50 TABLET ORAL NIGHTLY
Status: DISCONTINUED | OUTPATIENT
Start: 2020-05-18 | End: 2020-05-27 | Stop reason: HOSPADM

## 2020-05-18 RX ORDER — ACETAMINOPHEN 325 MG/1
650 TABLET ORAL EVERY 6 HOURS PRN
Status: DISCONTINUED | OUTPATIENT
Start: 2020-05-18 | End: 2020-05-27 | Stop reason: HOSPADM

## 2020-05-18 RX ORDER — CITALOPRAM 20 MG/1
20 TABLET ORAL DAILY
Status: DISCONTINUED | OUTPATIENT
Start: 2020-05-18 | End: 2020-05-27 | Stop reason: HOSPADM

## 2020-05-18 RX ORDER — AZITHROMYCIN 250 MG/1
500 TABLET, FILM COATED ORAL DAILY
Status: COMPLETED | OUTPATIENT
Start: 2020-05-18 | End: 2020-05-20

## 2020-05-18 RX ORDER — LEVOTHYROXINE SODIUM 0.05 MG/1
50 TABLET ORAL
Status: DISCONTINUED | OUTPATIENT
Start: 2020-05-18 | End: 2020-05-27 | Stop reason: HOSPADM

## 2020-05-18 RX ORDER — POLYETHYLENE GLYCOL 3350 17 G/17G
1 POWDER, FOR SOLUTION ORAL
Status: DISCONTINUED | OUTPATIENT
Start: 2020-05-18 | End: 2020-05-27 | Stop reason: HOSPADM

## 2020-05-18 RX ORDER — DIVALPROEX SODIUM 125 MG/1
125 CAPSULE, COATED PELLETS ORAL 2 TIMES DAILY
Status: DISCONTINUED | OUTPATIENT
Start: 2020-05-18 | End: 2020-05-27 | Stop reason: HOSPADM

## 2020-05-18 RX ORDER — MEMANTINE HYDROCHLORIDE 10 MG/1
10 TABLET ORAL 2 TIMES DAILY
Status: DISCONTINUED | OUTPATIENT
Start: 2020-05-18 | End: 2020-05-27 | Stop reason: HOSPADM

## 2020-05-18 RX ORDER — ONDANSETRON 2 MG/ML
4 INJECTION INTRAMUSCULAR; INTRAVENOUS EVERY 4 HOURS PRN
Status: DISCONTINUED | OUTPATIENT
Start: 2020-05-18 | End: 2020-05-27 | Stop reason: HOSPADM

## 2020-05-18 RX ORDER — QUETIAPINE FUMARATE 100 MG/1
100 TABLET, FILM COATED ORAL 2 TIMES DAILY
Status: DISCONTINUED | OUTPATIENT
Start: 2020-05-18 | End: 2020-05-18

## 2020-05-18 RX ORDER — ATORVASTATIN CALCIUM 20 MG/1
20 TABLET, FILM COATED ORAL NIGHTLY
Status: DISCONTINUED | OUTPATIENT
Start: 2020-05-18 | End: 2020-05-27 | Stop reason: HOSPADM

## 2020-05-18 RX ORDER — FAMOTIDINE 20 MG/1
20 TABLET, FILM COATED ORAL 2 TIMES DAILY
Status: DISCONTINUED | OUTPATIENT
Start: 2020-05-18 | End: 2020-05-27 | Stop reason: HOSPADM

## 2020-05-18 RX ORDER — SODIUM CHLORIDE, SODIUM LACTATE, POTASSIUM CHLORIDE, AND CALCIUM CHLORIDE .6; .31; .03; .02 G/100ML; G/100ML; G/100ML; G/100ML
1000 INJECTION, SOLUTION INTRAVENOUS ONCE
Status: COMPLETED | OUTPATIENT
Start: 2020-05-18 | End: 2020-05-18

## 2020-05-18 RX ORDER — SODIUM CHLORIDE, SODIUM LACTATE, POTASSIUM CHLORIDE, CALCIUM CHLORIDE 600; 310; 30; 20 MG/100ML; MG/100ML; MG/100ML; MG/100ML
INJECTION, SOLUTION INTRAVENOUS CONTINUOUS
Status: DISCONTINUED | OUTPATIENT
Start: 2020-05-18 | End: 2020-05-21

## 2020-05-18 RX ORDER — AMOXICILLIN 250 MG
2 CAPSULE ORAL 2 TIMES DAILY
Status: DISCONTINUED | OUTPATIENT
Start: 2020-05-18 | End: 2020-05-27 | Stop reason: HOSPADM

## 2020-05-18 RX ORDER — ACETAMINOPHEN 500 MG
500 TABLET ORAL 3 TIMES DAILY
COMMUNITY

## 2020-05-18 RX ORDER — LATANOPROST 50 UG/ML
1 SOLUTION/ DROPS OPHTHALMIC EVERY EVENING
Status: DISCONTINUED | OUTPATIENT
Start: 2020-05-18 | End: 2020-05-27 | Stop reason: HOSPADM

## 2020-05-18 RX ORDER — QUETIAPINE FUMARATE 100 MG/1
100 TABLET, FILM COATED ORAL EVERY EVENING
Status: DISCONTINUED | OUTPATIENT
Start: 2020-05-18 | End: 2020-05-27 | Stop reason: HOSPADM

## 2020-05-18 RX ORDER — ASPIRIN 81 MG/1
81 TABLET, CHEWABLE ORAL DAILY
Status: DISCONTINUED | OUTPATIENT
Start: 2020-05-18 | End: 2020-05-22

## 2020-05-18 RX ORDER — ONDANSETRON 4 MG/1
4 TABLET, ORALLY DISINTEGRATING ORAL EVERY 4 HOURS PRN
Status: DISCONTINUED | OUTPATIENT
Start: 2020-05-18 | End: 2020-05-27 | Stop reason: HOSPADM

## 2020-05-18 RX ORDER — MIRTAZAPINE 15 MG/1
15 TABLET, FILM COATED ORAL NIGHTLY
Status: DISCONTINUED | OUTPATIENT
Start: 2020-05-18 | End: 2020-05-27 | Stop reason: HOSPADM

## 2020-05-18 RX ADMIN — SODIUM CHLORIDE, POTASSIUM CHLORIDE, SODIUM LACTATE AND CALCIUM CHLORIDE 1000 ML: 600; 310; 30; 20 INJECTION, SOLUTION INTRAVENOUS at 12:00

## 2020-05-18 RX ADMIN — SODIUM CHLORIDE 1000 ML: 9 INJECTION, SOLUTION INTRAVENOUS at 00:17

## 2020-05-18 RX ADMIN — ENOXAPARIN SODIUM 40 MG: 100 INJECTION SUBCUTANEOUS at 06:49

## 2020-05-18 RX ADMIN — CEFTRIAXONE SODIUM 2 G: 2 INJECTION, POWDER, FOR SOLUTION INTRAMUSCULAR; INTRAVENOUS at 11:43

## 2020-05-18 RX ADMIN — CITALOPRAM HYDROBROMIDE 20 MG: 20 TABLET ORAL at 06:53

## 2020-05-18 RX ADMIN — DOCUSATE SODIUM 50 MG AND SENNOSIDES 8.6 MG 2 TABLET: 8.6; 5 TABLET, FILM COATED ORAL at 18:59

## 2020-05-18 RX ADMIN — SODIUM CHLORIDE, POTASSIUM CHLORIDE, SODIUM LACTATE AND CALCIUM CHLORIDE 1000 ML: 600; 310; 30; 20 INJECTION, SOLUTION INTRAVENOUS at 03:43

## 2020-05-18 RX ADMIN — ACETAMINOPHEN 650 MG: 325 TABLET, FILM COATED ORAL at 20:47

## 2020-05-18 RX ADMIN — ATORVASTATIN CALCIUM 20 MG: 20 TABLET, FILM COATED ORAL at 20:47

## 2020-05-18 RX ADMIN — MEMANTINE HYDROCHLORIDE 10 MG: 10 TABLET ORAL at 18:56

## 2020-05-18 RX ADMIN — DIVALPROEX SODIUM 125 MG: 125 CAPSULE ORAL at 06:54

## 2020-05-18 RX ADMIN — ASPIRIN 81 MG 81 MG: 81 TABLET ORAL at 06:50

## 2020-05-18 RX ADMIN — LEVOTHYROXINE SODIUM 50 MCG: 50 TABLET ORAL at 06:49

## 2020-05-18 RX ADMIN — AZITHROMYCIN MONOHYDRATE 500 MG: 500 INJECTION, POWDER, LYOPHILIZED, FOR SOLUTION INTRAVENOUS at 01:07

## 2020-05-18 RX ADMIN — CEFTRIAXONE SODIUM 2 G: 2 INJECTION, POWDER, FOR SOLUTION INTRAMUSCULAR; INTRAVENOUS at 00:17

## 2020-05-18 RX ADMIN — SODIUM CHLORIDE, POTASSIUM CHLORIDE, SODIUM LACTATE AND CALCIUM CHLORIDE: 600; 310; 30; 20 INJECTION, SOLUTION INTRAVENOUS at 05:50

## 2020-05-18 RX ADMIN — RALOXIFENE 60 MG: 60 TABLET ORAL at 06:53

## 2020-05-18 RX ADMIN — QUETIAPINE FUMARATE 100 MG: 100 TABLET ORAL at 18:57

## 2020-05-18 RX ADMIN — AZITHROMYCIN MONOHYDRATE 500 MG: 250 TABLET ORAL at 06:49

## 2020-05-18 RX ADMIN — MIRTAZAPINE 15 MG: 15 TABLET, FILM COATED ORAL at 21:05

## 2020-05-18 RX ADMIN — SODIUM CHLORIDE, POTASSIUM CHLORIDE, SODIUM LACTATE AND CALCIUM CHLORIDE: 600; 310; 30; 20 INJECTION, SOLUTION INTRAVENOUS at 14:09

## 2020-05-18 RX ADMIN — MEMANTINE HYDROCHLORIDE 10 MG: 10 TABLET ORAL at 06:53

## 2020-05-18 RX ADMIN — TRAZODONE HYDROCHLORIDE 50 MG: 100 TABLET ORAL at 20:47

## 2020-05-18 RX ADMIN — FAMOTIDINE 20 MG: 20 TABLET ORAL at 18:59

## 2020-05-18 RX ADMIN — SODIUM CHLORIDE, POTASSIUM CHLORIDE, SODIUM LACTATE AND CALCIUM CHLORIDE: 600; 310; 30; 20 INJECTION, SOLUTION INTRAVENOUS at 23:00

## 2020-05-18 RX ADMIN — DIVALPROEX SODIUM 125 MG: 125 CAPSULE ORAL at 18:56

## 2020-05-18 RX ADMIN — LATANOPROST 1 DROP: 50 SOLUTION OPHTHALMIC at 18:56

## 2020-05-18 ASSESSMENT — PATIENT HEALTH QUESTIONNAIRE - PHQ9
1. LITTLE INTEREST OR PLEASURE IN DOING THINGS: NOT AT ALL
SUM OF ALL RESPONSES TO PHQ9 QUESTIONS 1 AND 2: 0
2. FEELING DOWN, DEPRESSED, IRRITABLE, OR HOPELESS: NOT AT ALL
1. LITTLE INTEREST OR PLEASURE IN DOING THINGS: NOT AT ALL
SUM OF ALL RESPONSES TO PHQ9 QUESTIONS 1 AND 2: 0
2. FEELING DOWN, DEPRESSED, IRRITABLE, OR HOPELESS: NOT AT ALL

## 2020-05-18 ASSESSMENT — LIFESTYLE VARIABLES: EVER_SMOKED: NEVER

## 2020-05-18 ASSESSMENT — FIBROSIS 4 INDEX: FIB4 SCORE: 3.21

## 2020-05-18 NOTE — ED NOTES
Break RN:  Pt continues to be confused - reoriented.  Repositioned and replaced nasal cannula in nares.

## 2020-05-18 NOTE — ASSESSMENT & PLAN NOTE
- All cultures are NGTD  - initial COVID-19 test negative, but high risk. Unable to produce sputum for SARS-CoV2. Will repeat rapid COVID PCR which is negative x2  -SEE BELOW for CTA chest finsings  -abx's have finished their empiric course, remains stable  - repeat CXR. Check d dimer.  - continue respiratory support. Keep oxygen support, keep sats >89%.

## 2020-05-18 NOTE — PROGRESS NOTES
0600- Patient arrived to unit. Placed on telemetry. 3L NC.   Very confused, oriented x1.  IVF infusing.   Bed in low position. Safety maintained. Call light within reach.

## 2020-05-18 NOTE — ED PROVIDER NOTES
ED Provider Note    CHIEF COMPLAINT  Chief Complaint   Patient presents with   • N/V       HPI  Sameera Salinas is a 69 y.o. female who presents from a group home with severe dementia.  She does have a documented advanced directive showing DNR/DNI.  She came in with 2 episodes of vomiting throughout the day.  She is oriented to self only and is unable to provide much medical history.    REVIEW OF SYSTEMS  See HPI for further details.  Limited secondary to the patient's baseline dementia.    PAST MEDICAL HISTORY   has a past medical history of Dementia (HCC) and Dementia (HCC).    SOCIAL HISTORY  Social History     Tobacco Use   • Smoking status: Never Smoker   • Smokeless tobacco: Never Used   Substance and Sexual Activity   • Alcohol use: No   • Drug use: No   • Sexual activity: Not on file       SURGICAL HISTORY  patient denies any surgical history    CURRENT MEDICATIONS  Home Medications     Reviewed by Dez Saucedo R.N. (Registered Nurse) on 05/18/20 at 0450  Med List Status: Complete   Medication Last Dose Status   acetaminophen (TYLENOL) 500 MG Tab 5/17/2020 Active   Ascorbic Acid (VITAMIN C) 500 MG Cap 5/17/2020 Active   aspirin 81 MG tablet 5/17/2020 Active   atorvastatin (LIPITOR) 20 MG Tab 5/17/2020 Active   Calcium Carb-Cholecalciferol (OYSTER SHELL CALCIUM/VITAMIN D) 250-125 MG-UNIT Tab tablet 5/17/2020 Active   citalopram (CELEXA) 20 MG TABS  Active   clonazepam (KLONOPIN) 2 MG tablet  Active   Divalproex Sodium (DEPAKOTE) 125 MG Capsule Delayed Release Sprinkle 5/17/2020 Active   gemfibrozil (LOPID) 600 MG TABS  Active   levothyroxine (SYNTHROID) 50 MCG Tab 5/17/2020 Active   memantine (NAMENDA) 5 MG TABS  Active   mirtazapine (REMERON) 15 MG Tab 5/17/2020 Active   multivitamin (THERAGRAN) Tab 5/17/2020 Active   polyethylene glycol/lytes (MIRALAX) Pack 5/17/2020 Active   QUEtiapine (SEROQUEL) 100 MG Tab 5/17/2020 Active   raloxifene (EVISTA) 60 MG TABS 5/17/2020 Active   ranitidine (ZANTAC) 150 MG  "TABS  Active   travoprost (TRAVATAN Z) 0.004 % Solution 5/17/2020 Active   travoprost (TRAVOPROST) 0.004 % SOLN  Active   traZODone (DESYREL) 50 MG Tab 5/17/2020 Active                ALLERGIES  Allergies   Allergen Reactions   • Ampicillin Rash       PHYSICAL EXAM  VITAL SIGNS: /47   Pulse (!) 112   Temp 36.8 °C (98.2 °F) (Oral)   Resp (!) 23   Ht 1.6 m (5' 3\")   Wt 70.3 kg (155 lb)   SpO2 96%   BMI 27.46 kg/m²   Pulse ox interpretation: I interpret this pulse ox as normal.  Constitutional: Alert in no apparent distress.  HENT: No signs of trauma, Bilateral external ears normal, Nose normal.   Eyes: Pupils are equal and reactive, Conjunctiva normal, Non-icteric.   Neck: Normal range of motion, No tenderness, Supple, No stridor.   Cardiovascular: Regular rate and rhythm.   Thorax & Lungs: Normal breath sounds, No respiratory distress, No wheezing, No chest tenderness.   Abdomen: Bowel sounds normal, Soft, No tenderness, No masses, No pulsatile masses. No peritoneal signs.  Skin: Warm, Dry, No erythema, No rash.   Back: No bony tenderness, No CVA tenderness.   Extremities: Intact distal pulses, No edema, No tenderness, No cyanosis  Neurologic: Alert, oriented to self, moving all extremities, No focal deficits noted.       DIAGNOSTIC STUDIES / PROCEDURES    EKG - Physician interpretation  No results found for this or any previous visit.      LABS  Labs Reviewed   CBC WITH DIFFERENTIAL - Abnormal; Notable for the following components:       Result Value    WBC 17.0 (*)     RBC 3.84 (*)     Hemoglobin 8.7 (*)     Hematocrit 31.4 (*)     MCH 22.7 (*)     MCHC 27.7 (*)     RDW 54.4 (*)     Neutrophils-Polys 82.90 (*)     Lymphocytes 9.60 (*)     Neutrophils (Absolute) 14.12 (*)     Monos (Absolute) 1.10 (*)     All other components within normal limits   COMP METABOLIC PANEL - Abnormal; Notable for the following components:    Co2 19 (*)     Anion Gap 17.0 (*)     Glucose 186 (*)     AST(SGOT) 67 (*)     All " "other components within normal limits   URINALYSIS - Abnormal; Notable for the following components:    Ketones Trace (*)     Protein 30 (*)     Nitrite Positive (*)     Leukocyte Esterase Small (*)     Occult Blood Moderate (*)     All other components within normal limits   URINE MICROSCOPIC (W/UA) - Abnormal; Notable for the following components:    WBC  (*)     RBC 2-5 (*)     Bacteria Many (*)     All other components within normal limits   LACTIC ACID - Abnormal; Notable for the following components:    Lactic Acid 3.9 (*)     All other components within normal limits   LACTIC ACID - Abnormal; Notable for the following components:    Lactic Acid 3.8 (*)     All other components within normal limits   LIPASE   PERIPHERAL SMEAR REVIEW   PLATELET ESTIMATE   MORPHOLOGY   DIFFERENTIAL COMMENT   ESTIMATED GFR   COVID/SARS COV-2   BLOOD CULTURE    Narrative:     Per Hospital Policy: Only change Specimen Src: to \"Line\" if  specified by physician order.   BLOOD CULTURE    Narrative:     Per Hospital Policy: Only change Specimen Src: to \"Line\" if  specified by physician order.   SARS-COV-2, PCR (IN-HOUSE)   LACTIC ACID   PROTHROMBIN TIME    Narrative:     If not done within the last 4 hours  Indicate which anticoagulants the patient is on:->UNKNOWN   PROCALCITONIN    Narrative:     If not done within the last 4 hours  Indicate which anticoagulants the patient is on:->UNKNOWN   LACTIC ACID   TSH WITH REFLEX TO FT4         RADIOLOGY  DX-CHEST-PORTABLE (1 VIEW)   Final Result         Ill-defined left basilar opacity, atelectasis or consolidation.            COURSE & MEDICAL DECISION MAKING    Medications   senna-docusate (PERICOLACE or SENOKOT S) 8.6-50 MG per tablet 2 Tab (2 Tabs Oral Refused 5/18/20 0644)     And   polyethylene glycol/lytes (MIRALAX) PACKET 1 Packet (has no administration in time range)     And   magnesium hydroxide (MILK OF MAGNESIA) suspension 30 mL (has no administration in time range)     And "   bisacodyl (DULCOLAX) suppository 10 mg (has no administration in time range)   Respiratory Therapy Consult (has no administration in time range)   lactated ringers infusion ( Intravenous New Bag 5/18/20 0550)   enoxaparin (LOVENOX) inj 40 mg (40 mg Subcutaneous Given 5/18/20 0649)   acetaminophen (TYLENOL) tablet 650 mg (has no administration in time range)   ondansetron (ZOFRAN) syringe/vial injection 4 mg (has no administration in time range)   ondansetron (ZOFRAN ODT) dispertab 4 mg (has no administration in time range)   cefTRIAXone (ROCEPHIN) 2 g in  mL IVPB (has no administration in time range)   azithromycin (ZITHROMAX) tablet 500 mg (500 mg Oral Given 5/18/20 0649)   aspirin (ASA) chewable tab 81 mg (81 mg Oral Given 5/18/20 0650)   atorvastatin (LIPITOR) tablet 20 mg (has no administration in time range)   citalopram (CELEXA) tablet 20 mg (20 mg Oral Given 5/18/20 0653)   divalproex (DEPAKOTE SPRINKLE) capsule 125 mg (125 mg Oral Given 5/18/20 0654)   levothyroxine (SYNTHROID) tablet 50 mcg (50 mcg Oral Given 5/18/20 0649)   memantine (NAMENDA) tablet 10 mg (10 mg Oral Given 5/18/20 0653)   mirtazapine (REMERON) tablet 15 mg (has no administration in time range)   raloxifene (EVISTA) tablet 60 mg (60 mg Oral Given 5/18/20 0653)   raNITidine (ZANTAC) tablet 150 mg (has no administration in time range)   traZODone (DESYREL) tablet 50 mg (has no administration in time range)   QUEtiapine (SEROQUEL) tablet 100 mg (has no administration in time range)   latanoprost (XALATAN) 0.005 % ophthalmic solution 1 Drop (has no administration in time range)   lactated ringers infusion (BOLUS) (has no administration in time range)   ondansetron (ZOFRAN) syringe/vial injection 4 mg (4 mg Intravenous Given 5/17/20 2315)   cefTRIAXone (ROCEPHIN) 2 g in  mL IVPB (0 g Intravenous Stopped 5/18/20 0047)   azithromycin (ZITHROMAX) injection 500 mg (0 mg Intravenous Stopped 5/18/20 0207)   NS infusion 1,000 mL (0 mL  "Intravenous Stopped 5/18/20 0227)   lactated ringers infusion (BOLUS) (1,000 mL Intravenous New Bag 5/18/20 8115)       Pertinent Labs & Imaging studies reviewed. (See chart for details)  69 y.o. female presenting with 2 episodes of vomiting.  Does not appear to have any symptoms of pain.  No abdominal pain.  Presented with tachycardia however no fevers.  Laboratory studies were performed as there is limited history available from this patient with severe dementia.  She has tachycardia and leukocytosis.  Has a potential source of infection in the urine and questionable chest x-ray findings suggestive of a potential lower respiratory tract infection/pneumonia.  She does meet sepsis criteria and so she was started on IV antibiotics.  Given the patient's septic state, the patient will be hospitalized for further management under the care of the hospitalist Dr. Mckenzie.      /53   Pulse 95   Temp 36.7 °C (98 °F) (Temporal)   Resp 20   Ht 1.6 m (5' 3\")   Wt 72.8 kg (160 lb 7.9 oz)   SpO2 94%   BMI 28.43 kg/m²     The patient was referred to primary care where they will receive further BP management.      No follow-up provider specified.    FINAL IMPRESSION  Pyelonephritis  Vomiting  Left lower lobe pneumonia  Sepsis  Chronic dementia      Electronically signed by: Nicko Pedersen M.D., 5/17/2020 10:35 PM    "

## 2020-05-18 NOTE — CARE PLAN
Problem: Communication  Goal: The ability to communicate needs accurately and effectively will improve  Outcome: PROGRESSING AS EXPECTED     Problem: Safety  Goal: Will remain free from injury  Outcome: PROGRESSING AS EXPECTED  Goal: Will remain free from falls  Outcome: PROGRESSING AS EXPECTED     Problem: Infection  Goal: Will remain free from infection  Outcome: PROGRESSING AS EXPECTED     Problem: Psychosocial Needs:  Goal: Level of anxiety will decrease  Outcome: PROGRESSING AS EXPECTED     Problem: Fluid Volume:  Goal: Will maintain balanced intake and output  Outcome: PROGRESSING AS EXPECTED     Problem: Respiratory:  Goal: Respiratory status will improve  Outcome: PROGRESSING AS EXPECTED

## 2020-05-18 NOTE — ED TRIAGE NOTES
Patient sent in from [facility] for n/v.  Had an episode earlier today and recieved zofran. Later tonight become nauseous and EMS was called.  Patient has dementia.  A/Ox1 to self.

## 2020-05-18 NOTE — PROGRESS NOTES
Pt seen, exam stable  Tangential  Alert, no distress  Continue titration down of oxygen - currently down to 2 L  Lactic acid increased, will give bolus  covid negative  Continue to follow   axox to person only      See h/p from this am

## 2020-05-18 NOTE — H&P
Hospital Medicine History & Physical Note    Date of Service  5/18/2020    Primary Care Physician  Noni Velásquez M.D.    Code Status  Full Code    Chief Complaint  Chief Complaint   Patient presents with   • N/V       History of Presenting Illness  69 y.o. female with a past medical history of dementia who presented 5/17/2020 with nausea and vomiting.  History is limited due to patient's dementia.  Patient is a resident of an assisted care facility and was found to have nausea and vomiting for which he was sent to the ER.  In the ER she was noted to be hypoxic and was placed on 3 L of oxygen.  She denied any abdominal pain or chest pain.    Chest x-ray interpreted by me reveals left basilar opacity    Review of Systems  Review of Systems   Unable to perform ROS: Dementia       Past Medical History   has a past medical history of Dementia (HCC) and Dementia (HCC).    Surgical History  No pertinent surgical history    Family History  No pertinent family history    Social History   reports that she has never smoked. She has never used smokeless tobacco. She reports that she does not drink alcohol or use drugs.    Allergies  Allergies   Allergen Reactions   • Ampicillin Rash       Medications  Prior to Admission Medications   Prescriptions Last Dose Informant Patient Reported? Taking?   Ascorbic Acid (VITAMIN C) 500 MG Cap   Yes No   Sig: Take  by mouth.   Divalproex Sodium (DEPAKOTE) 125 MG Capsule Delayed Release Sprinkle   Yes No   Sig: Take 125 mg by mouth 2 Times a Day.   QUEtiapine (SEROQUEL) 100 MG Tab   Yes No   Sig: Take 100 mg by mouth 2 times a day.   aspirin 81 MG tablet   Yes No   Sig: Take 81 mg by mouth every day.   atorvastatin (LIPITOR) 20 MG Tab   Yes No   Sig: Take 20 mg by mouth every evening.   citalopram (CELEXA) 20 MG TABS   Yes No   Sig: Take 20 mg by mouth every day.   clonazepam (KLONOPIN) 2 MG tablet   Yes No   Sig: Take 1 mg by mouth every bedtime.   gemfibrozil (LOPID) 600 MG TABS   Yes No    Sig: Take 300 mg by mouth 2 Times a Day.   levothyroxine (SYNTHROID) 50 MCG Tab   Yes No   Sig: Take 50 mcg by mouth Every morning on an empty stomach.   memantine (NAMENDA) 5 MG TABS   No No   Sig: Take 2 Tabs by mouth 2 times a day.   mirtazapine (REMERON) 15 MG Tab   Yes No   Sig: Take 15 mg by mouth every evening.   polyethylene glycol/lytes (MIRALAX) Pack   Yes No   Sig: Take 17 g by mouth every day.   potassium chloride SA (K-DUR) 10 MEQ Tab CR   Yes No   Sig: Take 10 mEq by mouth 2 times a day.   raloxifene (EVISTA) 60 MG TABS   Yes No   Sig: Take 60 mg by mouth every day.   ranitidine (ZANTAC) 150 MG TABS   Yes No   Sig: Take 150 mg by mouth 2 times a day.   traZODone (DESYREL) 50 MG Tab   Yes No   Sig: Take 50 mg by mouth every evening.   travoprost (TRAVATAN Z) 0.004 % Solution   Yes No   Sig: Place 1 Drop in both eyes every evening.   travoprost (TRAVOPROST) 0.004 % SOLN   Yes No   Sig: Place 1 Drop in both eyes every evening.      Facility-Administered Medications: None       Physical Exam  Temp:  [36.8 °C (98.2 °F)] 36.8 °C (98.2 °F)  Pulse:  [] 99  Resp:  [15-23] 21  BP: ()/(47-75) 97/55  SpO2:  [89 %-96 %] 96 %    Physical Exam  Vitals signs and nursing note reviewed.   Constitutional:       General: She is not in acute distress.     Appearance: Normal appearance.   HENT:      Head: Normocephalic and atraumatic.      Nose: Nose normal.      Mouth/Throat:      Mouth: Mucous membranes are moist.   Eyes:      Extraocular Movements: Extraocular movements intact.      Conjunctiva/sclera: Conjunctivae normal.      Pupils: Pupils are equal, round, and reactive to light.   Neck:      Musculoskeletal: Normal range of motion and neck supple.   Cardiovascular:      Rate and Rhythm: Regular rhythm. Tachycardia present.      Pulses: Normal pulses.      Heart sounds: Normal heart sounds.   Pulmonary:      Breath sounds: Rales present. No wheezing or rhonchi.      Comments: Tachypneic  Abdominal:       General: Bowel sounds are normal. There is no distension.      Palpations: Abdomen is soft.      Tenderness: There is no abdominal tenderness.   Musculoskeletal: Normal range of motion.         General: No swelling or tenderness.   Lymphadenopathy:      Cervical: No cervical adenopathy.   Skin:     General: Skin is warm.      Coloration: Skin is not jaundiced.      Findings: No rash.   Neurological:      General: No focal deficit present.      Mental Status: She is alert. She is disoriented.      Cranial Nerves: No cranial nerve deficit.      Motor: No weakness.   Psychiatric:         Mood and Affect: Mood normal.         Behavior: Behavior normal.         Laboratory:  Recent Labs     05/17/20  2240   WBC 17.0*   RBC 3.84*   HEMOGLOBIN 8.7*   HEMATOCRIT 31.4*   MCV 81.8   MCH 22.7*   MCHC 27.7*   RDW 54.4*   PLATELETCT 307   MPV 9.3     Recent Labs     05/17/20  2240   SODIUM 139   POTASSIUM 4.8   CHLORIDE 103   CO2 19*   GLUCOSE 186*   BUN 11   CREATININE 0.91   CALCIUM 9.2     Recent Labs     05/17/20  2240   ALTSGPT 22   ASTSGOT 67*   ALKPHOSPHAT 88   TBILIRUBIN 0.4   LIPASE 20   GLUCOSE 186*         No results for input(s): NTPROBNP in the last 72 hours.      No results for input(s): TROPONINT in the last 72 hours.    Imaging:  DX-CHEST-PORTABLE (1 VIEW)   Final Result         Ill-defined left basilar opacity, atelectasis or consolidation.            Assessment/Plan:  I anticipate the patient will require greater than 2 midnights of inpatient medical management      CAP (community acquired pneumonia)- (present on admission)  Assessment & Plan  Patient has been started on IV Unasyn and azithromycin  Check pro calcitonin, if within normal limits we'll consider de-escalating antibiotics  RT protocol  Continue supplemental oxygen, wean as tolerated  Follow blood cultures  COVID-19 negative    Sepsis (HCC)- (present on admission)  Assessment & Plan  This is Sepsis Present on admission  SIRS criteria identified on my  evaluation include: Tachycardia, with heart rate greater than 90 BPM and Tachypnea, with respirations greater than 20 per minute  Source is pneumonia  Sepsis protocol initiated  Fluid resuscitation ordered per protocol  IV antibiotics as appropriate for source of sepsis  While organ dysfunction may be noted elsewhere in this problem list or in the chart, degree of organ dysfunction does not meet CMS criteria for severe sepsis          Nausea and vomiting- (present on admission)  Assessment & Plan  IV fluid hydration with LR  IV Zofran    Glaucoma- (present on admission)  Assessment & Plan  Continue home regimen of eyedrops    Hypothyroidism- (present on admission)  Assessment & Plan  Check TSH  Continue Synthroid 50    Dementia (HCC)- (present on admission)  Assessment & Plan  Frequent reorientation  Continue home regimen of Namenda, Depakote, Seroquel and Remeron

## 2020-05-18 NOTE — PROGRESS NOTES
PT refused to eat with the help of a tech. PT compromised with tech . PT ate 2 spoons full of mac and cheese and salad.

## 2020-05-18 NOTE — PROGRESS NOTES
Assessment completed. Pt A&Ox1. Confused and needs re-orientation and re-direction. Becomes irritable with assessment. Mild breathing effort, sating 90% on 2L n/c. Patient removes oxygen constantly.  Pt denies pain at this time. LUE appears contracted.  Guardian representative states patient uses walker at group home. Monitors applied, call light and belongings within reach. POC updated (fluids, abx). Pt educated on room and call light, pt verbalized understanding but requires re-educating. Communication board updated. Needs met.

## 2020-05-18 NOTE — DISCHARGE PLANNING
Care Transition Team Assessment    Received call from Linda El guardian # [phone]. Patient lives @ [facility]. Contact Ana @ [phone]. PCP Dr. Sanon. Uses walker at times. If HHC or home O2 needed will have Dr. Sanon arrange per Linda. Anticipate return to  when medically cleared. Linda Guardian and  to be notified of D/C.      Information Source  Orientation : Disoriented to Place, Disoriented to Time, Disoriented to Event  Information Given By: Legal Guardian  Informant's Name: Linda El  Who is responsible for making decisions for patient? : Guardian    Readmission Evaluation  Is this a readmission?: No    Interdisciplinary Discharge Planning  Primary Care Physician: SIMA SANON  Lives with - Patient's Self Care Capacity: Other (Comments)(Group Home)  Support Systems: Other (Comments)(Group home staff)  Housing / Facility: Group Home  Able to Return to Previous ADL's: (Unknown at present time)  Prior Services: Other (Comments)(Group home staff)  Patient Expects to be Discharged to:: Group home  Assistance Needed: Unknown at this Time  Durable Medical Equipment: Walker    Discharge Preparedness  What are your discharge supports?: Guardian, Other (comment)(Group home staff)  Prior Functional Level: Ambulatory, Uses Walker    Functional Assesment  Prior Functional Level: Ambulatory, Uses Walker    Finances  Prescription Coverage: Yes    Discharge Risks or Barriers  Patient risk factors: Vulnerable adult    Anticipated Discharge Information  Anticipated discharge disposition: Group home  Discharge Address: [address] [place] [address]  Discharge Contact Phone Number: [phone]

## 2020-05-18 NOTE — ASSESSMENT & PLAN NOTE
- This is Sepsis Present on admission. SIRS criteria identified on my evaluation include: Tachycardia, with heart rate greater than 90 BPM and Tachypnea, with respirations greater than 20 per minute. Source is pneumonia.  - continue sepsis protocol, fluid resuscitation ordered per protocol. Continue IV LR at 125cc/hr for now. Lactate has normalized, D/C checks.    -continue IV antibiotics as appropriate for source of sepsis  -While organ dysfunction may be noted elsewhere in this problem list or in the chart, degree of organ dysfunction does not meet CMS criteria for severe sepsis

## 2020-05-19 PROBLEM — D64.9 ANEMIA: Status: ACTIVE | Noted: 2020-05-19

## 2020-05-19 LAB
ANION GAP SERPL CALC-SCNC: 12 MMOL/L (ref 7–16)
ANISOCYTOSIS BLD QL SMEAR: ABNORMAL
BASOPHILS # BLD AUTO: 0.7 % (ref 0–1.8)
BASOPHILS # BLD: 0.06 K/UL (ref 0–0.12)
BUN SERPL-MCNC: 10 MG/DL (ref 8–22)
CALCIUM SERPL-MCNC: 8.6 MG/DL (ref 8.5–10.5)
CHLORIDE SERPL-SCNC: 108 MMOL/L (ref 96–112)
CO2 SERPL-SCNC: 24 MMOL/L (ref 20–33)
COMMENT 1642: NORMAL
CREAT SERPL-MCNC: 0.74 MG/DL (ref 0.5–1.4)
EOSINOPHIL # BLD AUTO: 0.11 K/UL (ref 0–0.51)
EOSINOPHIL NFR BLD: 1.2 % (ref 0–6.9)
ERYTHROCYTE [DISTWIDTH] IN BLOOD BY AUTOMATED COUNT: 56.7 FL (ref 35.9–50)
FERRITIN SERPL-MCNC: 31.1 NG/ML (ref 10–291)
FOLATE SERPL-MCNC: 30.2 NG/ML
GLUCOSE SERPL-MCNC: 98 MG/DL (ref 65–99)
HCT VFR BLD AUTO: 28.5 % (ref 37–47)
HGB BLD-MCNC: 7.7 G/DL (ref 12–16)
HGB RETIC QN AUTO: 19.6 PG/CELL (ref 29–35)
IMM GRANULOCYTES # BLD AUTO: 0.06 K/UL (ref 0–0.11)
IMM GRANULOCYTES NFR BLD AUTO: 0.7 % (ref 0–0.9)
IMM RETICS NFR: 34.2 % (ref 9.3–17.4)
IRON SATN MFR SERPL: 4 % (ref 15–55)
IRON SERPL-MCNC: 14 UG/DL (ref 40–170)
LACTATE BLD-SCNC: 2.4 MMOL/L (ref 0.5–2)
LACTATE BLD-SCNC: 2.8 MMOL/L (ref 0.5–2)
LACTATE BLD-SCNC: 2.9 MMOL/L (ref 0.5–2)
LACTATE BLD-SCNC: 2.9 MMOL/L (ref 0.5–2)
LDH SERPL L TO P-CCNC: 339 U/L (ref 107–266)
LYMPHOCYTES # BLD AUTO: 3.5 K/UL (ref 1–4.8)
LYMPHOCYTES NFR BLD: 38.1 % (ref 22–41)
MCH RBC QN AUTO: 22.7 PG (ref 27–33)
MCHC RBC AUTO-ENTMCNC: 27 G/DL (ref 33.6–35)
MCV RBC AUTO: 84.1 FL (ref 81.4–97.8)
MICROCYTES BLD QL SMEAR: ABNORMAL
MONOCYTES # BLD AUTO: 0.92 K/UL (ref 0–0.85)
MONOCYTES NFR BLD AUTO: 10 % (ref 0–13.4)
MORPHOLOGY BLD-IMP: NORMAL
NEUTROPHILS # BLD AUTO: 4.53 K/UL (ref 2–7.15)
NEUTROPHILS NFR BLD: 49.3 % (ref 44–72)
NRBC # BLD AUTO: 0.02 K/UL
NRBC BLD-RTO: 0.2 /100 WBC
OVALOCYTES BLD QL SMEAR: NORMAL
PLATELET # BLD AUTO: 254 K/UL (ref 164–446)
PLATELET BLD QL SMEAR: NORMAL
PMV BLD AUTO: 9.8 FL (ref 9–12.9)
POIKILOCYTOSIS BLD QL SMEAR: NORMAL
POLYCHROMASIA BLD QL SMEAR: NORMAL
POTASSIUM SERPL-SCNC: 4 MMOL/L (ref 3.6–5.5)
PROCALCITONIN SERPL-MCNC: 0.08 NG/ML
RBC # BLD AUTO: 3.39 M/UL (ref 4.2–5.4)
RBC BLD AUTO: PRESENT
RETICS # AUTO: 0.1 M/UL (ref 0.04–0.06)
RETICS/RBC NFR: 2.6 % (ref 0.8–2.1)
SODIUM SERPL-SCNC: 144 MMOL/L (ref 135–145)
TIBC SERPL-MCNC: 337 UG/DL (ref 250–450)
UIBC SERPL-MCNC: 323 UG/DL (ref 110–370)
VIT B12 SERPL-MCNC: 703 PG/ML (ref 211–911)
WBC # BLD AUTO: 9.2 K/UL (ref 4.8–10.8)

## 2020-05-19 PROCEDURE — 84145 PROCALCITONIN (PCT): CPT

## 2020-05-19 PROCEDURE — 700105 HCHG RX REV CODE 258: Performed by: INTERNAL MEDICINE

## 2020-05-19 PROCEDURE — A9270 NON-COVERED ITEM OR SERVICE: HCPCS | Performed by: INTERNAL MEDICINE

## 2020-05-19 PROCEDURE — 82607 VITAMIN B-12: CPT

## 2020-05-19 PROCEDURE — 770020 HCHG ROOM/CARE - TELE (206)

## 2020-05-19 PROCEDURE — 83550 IRON BINDING TEST: CPT

## 2020-05-19 PROCEDURE — 94760 N-INVAS EAR/PLS OXIMETRY 1: CPT

## 2020-05-19 PROCEDURE — 85046 RETICYTE/HGB CONCENTRATE: CPT

## 2020-05-19 PROCEDURE — A9270 NON-COVERED ITEM OR SERVICE: HCPCS | Performed by: HOSPITALIST

## 2020-05-19 PROCEDURE — 83615 LACTATE (LD) (LDH) ENZYME: CPT

## 2020-05-19 PROCEDURE — 82746 ASSAY OF FOLIC ACID SERUM: CPT

## 2020-05-19 PROCEDURE — 99233 SBSQ HOSP IP/OBS HIGH 50: CPT | Performed by: INTERNAL MEDICINE

## 2020-05-19 PROCEDURE — 700102 HCHG RX REV CODE 250 W/ 637 OVERRIDE(OP): Performed by: HOSPITALIST

## 2020-05-19 PROCEDURE — 700102 HCHG RX REV CODE 250 W/ 637 OVERRIDE(OP): Performed by: INTERNAL MEDICINE

## 2020-05-19 PROCEDURE — 700111 HCHG RX REV CODE 636 W/ 250 OVERRIDE (IP): Performed by: INTERNAL MEDICINE

## 2020-05-19 PROCEDURE — 80048 BASIC METABOLIC PNL TOTAL CA: CPT

## 2020-05-19 PROCEDURE — 85025 COMPLETE CBC W/AUTO DIFF WBC: CPT

## 2020-05-19 PROCEDURE — 83540 ASSAY OF IRON: CPT

## 2020-05-19 PROCEDURE — 82728 ASSAY OF FERRITIN: CPT

## 2020-05-19 PROCEDURE — 83605 ASSAY OF LACTIC ACID: CPT

## 2020-05-19 RX ADMIN — TRAZODONE HYDROCHLORIDE 50 MG: 100 TABLET ORAL at 21:51

## 2020-05-19 RX ADMIN — ASPIRIN 81 MG 81 MG: 81 TABLET ORAL at 04:29

## 2020-05-19 RX ADMIN — LATANOPROST 1 DROP: 50 SOLUTION OPHTHALMIC at 17:42

## 2020-05-19 RX ADMIN — DIVALPROEX SODIUM 125 MG: 125 CAPSULE ORAL at 04:28

## 2020-05-19 RX ADMIN — DOCUSATE SODIUM 50 MG AND SENNOSIDES 8.6 MG 2 TABLET: 8.6; 5 TABLET, FILM COATED ORAL at 04:28

## 2020-05-19 RX ADMIN — CEFTRIAXONE SODIUM 2 G: 2 INJECTION, POWDER, FOR SOLUTION INTRAMUSCULAR; INTRAVENOUS at 04:15

## 2020-05-19 RX ADMIN — RALOXIFENE 60 MG: 60 TABLET ORAL at 04:28

## 2020-05-19 RX ADMIN — FAMOTIDINE 20 MG: 20 TABLET ORAL at 17:38

## 2020-05-19 RX ADMIN — CITALOPRAM HYDROBROMIDE 20 MG: 20 TABLET ORAL at 04:29

## 2020-05-19 RX ADMIN — ENOXAPARIN SODIUM 40 MG: 100 INJECTION SUBCUTANEOUS at 04:28

## 2020-05-19 RX ADMIN — FAMOTIDINE 20 MG: 20 TABLET ORAL at 04:28

## 2020-05-19 RX ADMIN — ATORVASTATIN CALCIUM 20 MG: 20 TABLET, FILM COATED ORAL at 21:51

## 2020-05-19 RX ADMIN — LEVOTHYROXINE SODIUM 50 MCG: 50 TABLET ORAL at 04:28

## 2020-05-19 RX ADMIN — SODIUM CHLORIDE, POTASSIUM CHLORIDE, SODIUM LACTATE AND CALCIUM CHLORIDE: 600; 310; 30; 20 INJECTION, SOLUTION INTRAVENOUS at 19:52

## 2020-05-19 RX ADMIN — MIRTAZAPINE 15 MG: 15 TABLET, FILM COATED ORAL at 21:51

## 2020-05-19 RX ADMIN — QUETIAPINE FUMARATE 100 MG: 100 TABLET ORAL at 17:41

## 2020-05-19 RX ADMIN — MEMANTINE HYDROCHLORIDE 10 MG: 10 TABLET ORAL at 04:29

## 2020-05-19 RX ADMIN — AZITHROMYCIN MONOHYDRATE 500 MG: 250 TABLET ORAL at 04:28

## 2020-05-19 RX ADMIN — SODIUM CHLORIDE, POTASSIUM CHLORIDE, SODIUM LACTATE AND CALCIUM CHLORIDE: 600; 310; 30; 20 INJECTION, SOLUTION INTRAVENOUS at 11:10

## 2020-05-19 RX ADMIN — DOCUSATE SODIUM 50 MG AND SENNOSIDES 8.6 MG 2 TABLET: 8.6; 5 TABLET, FILM COATED ORAL at 17:41

## 2020-05-19 RX ADMIN — MEMANTINE HYDROCHLORIDE 10 MG: 10 TABLET ORAL at 17:39

## 2020-05-19 RX ADMIN — DIVALPROEX SODIUM 125 MG: 125 CAPSULE ORAL at 17:37

## 2020-05-19 ASSESSMENT — LIFESTYLE VARIABLES
CONSUMPTION TOTAL: NEGATIVE
HAVE PEOPLE ANNOYED YOU BY CRITICIZING YOUR DRINKING: NO
HOW MANY TIMES IN THE PAST YEAR HAVE YOU HAD 5 OR MORE DRINKS IN A DAY: 0
EVER FELT BAD OR GUILTY ABOUT YOUR DRINKING: NO
TOTAL SCORE: 0
EVER HAD A DRINK FIRST THING IN THE MORNING TO STEADY YOUR NERVES TO GET RID OF A HANGOVER: NO
AVERAGE NUMBER OF DAYS PER WEEK YOU HAVE A DRINK CONTAINING ALCOHOL: 0
ON A TYPICAL DAY WHEN YOU DRINK ALCOHOL HOW MANY DRINKS DO YOU HAVE: 0
HAVE YOU EVER FELT YOU SHOULD CUT DOWN ON YOUR DRINKING: NO
ALCOHOL_USE: NO

## 2020-05-19 NOTE — PROGRESS NOTES
[facility] Medicine Daily Progress Note    Date of Service  5/19/2020    Chief Complaint  N/V    Hospital Course    69 y.o. female with dementia, who lives in an assisted living facility, admitted 5/17/2020 with nausea, and vomiting. She was also found to be hypoxic in the ED (3L O2 by NC). CXR showed left basilar opacity. WBC count was 17. COVID-19 was checked and was negative. She fulfilled sepsis criteria, and was started on IVF in addition to antibiotics.           Interval Problem Update  5/19/2020 - I reviewed the patient's chart. There were no significant overnight events. Remains hemodynamically stable and afebrile. Remains on 3L O2 NC. WBC coutn has normalized. Hgb dropped 7.7. BMP unimpressive. Lactate was 3.0. 1st procalcitonin was 0.11.    > I have personally seen and examined the patient today. Confused/demented. (+) tangential. Denies SOB, cough or pain. No nausea or vomiting.      Consultants/Specialty  none    Code Status  DNR/DNI    Disposition  Monitor on the CDU    Review of Systems  ROS     Pertinent positives/negatives as mentioned above.     A complete review of systems was personally done by me, limited by dementia/confusion. All other systems were negative.       Physical Exam  Temp:  [36.3 °C (97.3 °F)-36.6 °C (97.8 °F)] 36.3 °C (97.3 °F)  Pulse:  [82-93] 90  Resp:  [16-24] 20  BP: (101-160)/(64-81) 107/80  SpO2:  [90 %-98 %] 90 %    Physical Exam  Vitals signs reviewed.   Constitutional:       General: She is not in acute distress.     Appearance: Normal appearance. She is normal weight. She is not ill-appearing or diaphoretic.   HENT:      Head: Normocephalic and atraumatic.      Right Ear: External ear normal.      Left Ear: External ear normal.      Nose: Nose normal. No congestion or rhinorrhea.      Mouth/Throat:      Mouth: Mucous membranes are moist.      Pharynx: No oropharyngeal exudate or posterior oropharyngeal erythema.   Eyes:      General: No scleral icterus.     Extraocular  Movements: Extraocular movements intact.      Conjunctiva/sclera: Conjunctivae normal.      Pupils: Pupils are equal, round, and reactive to light.   Neck:      Musculoskeletal: Normal range of motion and neck supple. No neck rigidity or muscular tenderness.   Cardiovascular:      Rate and Rhythm: Normal rate and regular rhythm.      Heart sounds: Normal heart sounds. No murmur.   Pulmonary:      Effort: Pulmonary effort is normal. No respiratory distress.      Breath sounds: No stridor. No wheezing, rhonchi or rales.      Comments: Diminished air entry B/L bases, otherwise clear to auscultation  Chest:      Chest wall: No tenderness.   Abdominal:      General: Bowel sounds are normal. There is no distension.      Palpations: Abdomen is soft. There is no mass.      Tenderness: There is no abdominal tenderness. There is no guarding or rebound.   Musculoskeletal: Normal range of motion.         General: No swelling.      Right lower leg: No edema.      Left lower leg: No edema.   Lymphadenopathy:      Cervical: No cervical adenopathy.   Skin:     General: Skin is warm and dry.      Coloration: Skin is not jaundiced.      Findings: No rash.   Neurological:      General: No focal deficit present.      Mental Status: She is alert. Mental status is at baseline.      Cranial Nerves: No cranial nerve deficit.      Comments: Confused. (+) demented.    Psychiatric:         Mood and Affect: Mood normal.         Behavior: Behavior normal.      Comments: Impaired judgment, circumstantial thought           Fluids    Intake/Output Summary (Last 24 hours) at 5/19/2020 1338  Last data filed at 5/18/2020 2000  Gross per 24 hour   Intake 1770.83 ml   Output --   Net 1770.83 ml       Laboratory  Recent Labs     05/17/20  2240 05/19/20  0430   WBC 17.0* 9.2   RBC 3.84* 3.39*   HEMOGLOBIN 8.7* 7.7*   HEMATOCRIT 31.4* 28.5*   MCV 81.8 84.1   MCH 22.7* 22.7*   MCHC 27.7* 27.0*   RDW 54.4* 56.7*   PLATELETCT 307 254   MPV 9.3 9.8     Recent  Labs     05/17/20  2240 05/19/20  0430   SODIUM 139 144   POTASSIUM 4.8 4.0   CHLORIDE 103 108   CO2 19* 24   GLUCOSE 186* 98   BUN 11 10   CREATININE 0.91 0.74   CALCIUM 9.2 8.6     Recent Labs     05/17/20  2240   INR 1.01               Imaging  DX-CHEST-PORTABLE (1 VIEW)   Final Result         Ill-defined left basilar opacity, atelectasis or consolidation.           Assessment/Plan  * CAP (community acquired pneumonia)- (present on admission)  Assessment & Plan  - continue IV ceftriaxone and azithromycin. Trend procalcitonin.   - initial COVID-19 test negative, but high risk. Send for sputum SARS-CoV2. Maintain on contact and droplet precautions with eye protection.   - continue respiratory support. Keep oxygen support, keep sats >89%.      Sepsis (HCC)- (present on admission)  Assessment & Plan  - This is Sepsis Present on admission. SIRS criteria identified on my evaluation include: Tachycardia, with heart rate greater than 90 BPM and Tachypnea, with respirations greater than 20 per minute. Source is pneumonia.  - continue sepsis protocol, fluid resuscitation ordered per protocol. Continue IV LR at 125cc/hr. Continue to check lactic acid q4H, with PRN IVF bolus.   -continue IV antibiotics as appropriate for source of sepsis  -While organ dysfunction may be noted elsewhere in this problem list or in the chart, degree of organ dysfunction does not meet CMS criteria for severe sepsis      Anemia  Assessment & Plan  - likely dilutional from IVF. No gross bleeding.   - check ferritin, iron/TIBC, folate, vit B12, LDH, reticulocyte count.   - monitor Hgb. Restrictive transfusion strategy, transfuse if Hgb drops below 7.     Nausea and vomiting- (present on admission)  Assessment & Plan  - resolved. Continue supportive care with IVF, PRN antiemetics.     Glaucoma- (present on admission)  Assessment & Plan  -Continue home regimen of eyedrops    Hypothyroidism- (present on admission)  Assessment & Plan  - TSH WNL.  Continue home synthroid.     Dementia (HCC)- (present on admission)  Assessment & Plan  - continue home regimen of Namenda, Depakote, Seroquel and Remeron.  - high risk for delirium. Frequent re-orientation, reestablish circadian rhythm, encourage familiar faces/family in room, avoid or minimize narcotics/sedatives.        VTE prophylaxis: lovenox SQ

## 2020-05-19 NOTE — RESPIRATORY CARE
Oxygen Rounds      Patient found on    O2 L/m:  ___3 ______    Oxygen device:  ____NC____   Spo2: ____90_____%      Respiratory device skin site inspection completed.

## 2020-05-19 NOTE — CARE PLAN
Problem: Communication  Goal: The ability to communicate needs accurately and effectively will improve  Outcome: PROGRESSING SLOWER THAN EXPECTED     Problem: Safety  Goal: Will remain free from injury  Outcome: PROGRESSING SLOWER THAN EXPECTED       Problem: Infection  Goal: Will remain free from infection  Outcome: PROGRESSING SLOWER THAN EXPECTED

## 2020-05-19 NOTE — PROGRESS NOTES
Report received from ERI Vergara. Patient laying in bed. Confused but calm. No signs of distress. Will return to assess.

## 2020-05-19 NOTE — PROGRESS NOTES
PT ate all her eggs with the assistance of the tech. PT did not want to drink her orange juice multiple times.

## 2020-05-20 LAB
BASOPHILS # BLD AUTO: 0.9 % (ref 0–1.8)
BASOPHILS # BLD: 0.09 K/UL (ref 0–0.12)
EOSINOPHIL # BLD AUTO: 0.11 K/UL (ref 0–0.51)
EOSINOPHIL NFR BLD: 1 % (ref 0–6.9)
ERYTHROCYTE [DISTWIDTH] IN BLOOD BY AUTOMATED COUNT: 56.6 FL (ref 35.9–50)
HCT VFR BLD AUTO: 27.2 % (ref 37–47)
HGB BLD-MCNC: 7.5 G/DL (ref 12–16)
IMM GRANULOCYTES # BLD AUTO: 0.07 K/UL (ref 0–0.11)
IMM GRANULOCYTES NFR BLD AUTO: 0.7 % (ref 0–0.9)
LACTATE BLD-SCNC: 2 MMOL/L (ref 0.5–2)
LACTATE BLD-SCNC: 2.1 MMOL/L (ref 0.5–2)
LACTATE BLD-SCNC: 6.9 MMOL/L (ref 0.5–2)
LYMPHOCYTES # BLD AUTO: 2.68 K/UL (ref 1–4.8)
LYMPHOCYTES NFR BLD: 25.4 % (ref 22–41)
MCH RBC QN AUTO: 22.7 PG (ref 27–33)
MCHC RBC AUTO-ENTMCNC: 27.6 G/DL (ref 33.6–35)
MCV RBC AUTO: 82.2 FL (ref 81.4–97.8)
MONOCYTES # BLD AUTO: 1.13 K/UL (ref 0–0.85)
MONOCYTES NFR BLD AUTO: 10.7 % (ref 0–13.4)
NEUTROPHILS # BLD AUTO: 6.47 K/UL (ref 2–7.15)
NEUTROPHILS NFR BLD: 61.3 % (ref 44–72)
NRBC # BLD AUTO: 0 K/UL
NRBC BLD-RTO: 0 /100 WBC
PLATELET # BLD AUTO: 266 K/UL (ref 164–446)
PMV BLD AUTO: 10 FL (ref 9–12.9)
PROCALCITONIN SERPL-MCNC: 0.06 NG/ML
RBC # BLD AUTO: 3.31 M/UL (ref 4.2–5.4)
WBC # BLD AUTO: 10.6 K/UL (ref 4.8–10.8)

## 2020-05-20 PROCEDURE — 99233 SBSQ HOSP IP/OBS HIGH 50: CPT | Performed by: INTERNAL MEDICINE

## 2020-05-20 PROCEDURE — 84145 PROCALCITONIN (PCT): CPT

## 2020-05-20 PROCEDURE — 700111 HCHG RX REV CODE 636 W/ 250 OVERRIDE (IP): Performed by: INTERNAL MEDICINE

## 2020-05-20 PROCEDURE — 83605 ASSAY OF LACTIC ACID: CPT

## 2020-05-20 PROCEDURE — A9270 NON-COVERED ITEM OR SERVICE: HCPCS | Performed by: INTERNAL MEDICINE

## 2020-05-20 PROCEDURE — 700102 HCHG RX REV CODE 250 W/ 637 OVERRIDE(OP): Performed by: INTERNAL MEDICINE

## 2020-05-20 PROCEDURE — 770020 HCHG ROOM/CARE - TELE (206)

## 2020-05-20 PROCEDURE — 700105 HCHG RX REV CODE 258: Performed by: INTERNAL MEDICINE

## 2020-05-20 PROCEDURE — 85025 COMPLETE CBC W/AUTO DIFF WBC: CPT

## 2020-05-20 PROCEDURE — A9270 NON-COVERED ITEM OR SERVICE: HCPCS | Performed by: HOSPITALIST

## 2020-05-20 PROCEDURE — 700102 HCHG RX REV CODE 250 W/ 637 OVERRIDE(OP): Performed by: HOSPITALIST

## 2020-05-20 RX ADMIN — MIRTAZAPINE 15 MG: 15 TABLET, FILM COATED ORAL at 20:35

## 2020-05-20 RX ADMIN — MEMANTINE HYDROCHLORIDE 10 MG: 10 TABLET ORAL at 05:01

## 2020-05-20 RX ADMIN — FAMOTIDINE 20 MG: 20 TABLET ORAL at 17:39

## 2020-05-20 RX ADMIN — DIVALPROEX SODIUM 125 MG: 125 CAPSULE ORAL at 05:02

## 2020-05-20 RX ADMIN — AZITHROMYCIN MONOHYDRATE 500 MG: 250 TABLET ORAL at 05:01

## 2020-05-20 RX ADMIN — MEMANTINE HYDROCHLORIDE 10 MG: 10 TABLET ORAL at 17:39

## 2020-05-20 RX ADMIN — ATORVASTATIN CALCIUM 20 MG: 20 TABLET, FILM COATED ORAL at 20:35

## 2020-05-20 RX ADMIN — CITALOPRAM HYDROBROMIDE 20 MG: 20 TABLET ORAL at 05:02

## 2020-05-20 RX ADMIN — LEVOTHYROXINE SODIUM 50 MCG: 50 TABLET ORAL at 05:02

## 2020-05-20 RX ADMIN — SODIUM CHLORIDE, POTASSIUM CHLORIDE, SODIUM LACTATE AND CALCIUM CHLORIDE: 600; 310; 30; 20 INJECTION, SOLUTION INTRAVENOUS at 06:21

## 2020-05-20 RX ADMIN — CEFTRIAXONE SODIUM 2 G: 2 INJECTION, POWDER, FOR SOLUTION INTRAMUSCULAR; INTRAVENOUS at 04:47

## 2020-05-20 RX ADMIN — ENOXAPARIN SODIUM 40 MG: 100 INJECTION SUBCUTANEOUS at 05:00

## 2020-05-20 RX ADMIN — ASPIRIN 81 MG 81 MG: 81 TABLET ORAL at 05:01

## 2020-05-20 RX ADMIN — DIVALPROEX SODIUM 125 MG: 125 CAPSULE ORAL at 17:38

## 2020-05-20 RX ADMIN — FAMOTIDINE 20 MG: 20 TABLET ORAL at 05:02

## 2020-05-20 RX ADMIN — QUETIAPINE FUMARATE 100 MG: 100 TABLET ORAL at 17:40

## 2020-05-20 RX ADMIN — TRAZODONE HYDROCHLORIDE 50 MG: 100 TABLET ORAL at 20:35

## 2020-05-20 RX ADMIN — LATANOPROST 1 DROP: 50 SOLUTION OPHTHALMIC at 17:40

## 2020-05-20 RX ADMIN — RALOXIFENE 60 MG: 60 TABLET ORAL at 05:01

## 2020-05-20 NOTE — CARE PLAN
Problem: Communication  Goal: The ability to communicate needs accurately and effectively will improve  Outcome: PROGRESSING AS EXPECTED     Problem: Safety  Goal: Will remain free from falls  Outcome: PROGRESSING AS EXPECTED  Note: Pt educated on safety precautions, utilization of the call light, and bed alarm.  Pt verbalized understanding.      Problem: Infection  Goal: Will remain free from infection  Outcome: PROGRESSING AS EXPECTED  Note: Implement standard precautions and perform handwashing before and after patient contact. RN will follow protocols and necessary steps to minimize the spread of infection.  RN educated pt and any visitors on proper hand hygiene.      Problem: Respiratory:  Goal: Respiratory status will improve  Outcome: PROGRESSING AS EXPECTED

## 2020-05-20 NOTE — PROGRESS NOTES
Jonah from Lab called with critical result of 6.9 Lactic at 0650. Critical lab result read back to Jan.   Dr. Borges notified of critical lab result at 0655.  Critical lab result read back by Dr. Borges.  Lactic acid redrawn.

## 2020-05-20 NOTE — PROGRESS NOTES
PT ate one whole kirsty of sausage, one whole of pancake, 2 pieces of apple slices, and PT drank half of her orange juice.

## 2020-05-20 NOTE — CARE PLAN
Problem: Psychosocial Needs:  Goal: Level of anxiety will decrease  Outcome: PROGRESSING SLOWER THAN EXPECTED     Problem: Infection  Goal: Will remain free from infection  Outcome: PROGRESSING SLOWER THAN EXPECTED     Problem: Safety  Goal: Will remain free from falls  Outcome: PROGRESSING SLOWER THAN EXPECTED     Problem: Communication  Goal: The ability to communicate needs accurately and effectively will improve  Outcome: PROGRESSING SLOWER THAN EXPECTED

## 2020-05-20 NOTE — PROGRESS NOTES
Pt incontinent of urine and stool, stephanie care provided and new brief placed on patient. Completed linen change

## 2020-05-20 NOTE — ED NOTES
Pt to imaging.    Render Post-Care Instructions In Note?: no Duration Of Freeze Thaw-Cycle (Seconds): 0 Detail Level: Detailed Number Of Freeze-Thaw Cycles: 1 freeze-thaw cycle Post-Care Instructions: I reviewed with the patient in detail post-care instructions. Patient is to wear sunprotection, and avoid picking at any of the treated lesions. Pt may apply Vaseline to crusted or scabbing areas. Consent: The patient's consent was obtained including but not limited to risks of crusting, scabbing, blistering, scarring, darker or lighter pigmentary change, recurrence, incomplete removal and infection.

## 2020-05-20 NOTE — PROGRESS NOTES
[facility] Medicine Daily Progress Note    Date of Service  5/20/2020    Chief Complaint  N/V    Hospital Course    69 y.o. female with dementia, who lives in an assisted living facility, admitted 5/17/2020 with nausea, and vomiting. She was also found to be hypoxic in the ED (3L O2 by NC). CXR showed left basilar opacity. WBC count was 17. COVID-19 was checked and was negative. Her 1st procalcitonin was low. She fulfilled sepsis criteria, and was started on IVF in addition to antibiotics. WBC count has normalized. Hgb dropped 7.7 which was felt to be due to dilution from IVF, with no evidence of gross bleeding.           Interval Problem Update  5/20/2020 - I reviewed the patient's chart today. Uneventful night. VSS. Afebrile. Saturating well on 3L O2 NC.  WBC count remains normal. Hgb 7.5. Lactate improved to 2.1, 2.0 (had 6.9 which was likely erroneous - repeat was 2.0). 2nd procalcitonin was 0.06. Sputum COVID pending.     > I have personally seen and examined the patient today. She is comfortable but confused. She states she is short of breath with cough. She states she has phlegm, but attempts to get sputum yesterday was unsuccessful. No other complaints. Circumstantial thoughts. No nausea, vomiting, abd pain.         Consultants/Specialty  none    Code Status  DNR/DNI    Disposition  Monitor on the CDU    Review of Systems  ROS     Pertinent positives/negatives as mentioned above.     A complete review of systems was personally done by me, limited by dementia/confusion. All other systems were negative.       Physical Exam  Temp:  [36.1 °C (97 °F)-36.7 °C (98.1 °F)] 36.5 °C (97.7 °F)  Pulse:  [88-97] 88  Resp:  [16-20] 16  BP: (107-165)/(58-86) 137/60  SpO2:  [88 %-90 %] 90 %    Physical Exam  Vitals signs reviewed.   Constitutional:       General: She is not in acute distress.     Appearance: Normal appearance. She is normal weight. She is not ill-appearing or diaphoretic.   HENT:      Head: Normocephalic and  atraumatic.      Right Ear: External ear normal.      Left Ear: External ear normal.      Nose: Nose normal. No congestion or rhinorrhea.      Mouth/Throat:      Mouth: Mucous membranes are moist.      Pharynx: No oropharyngeal exudate or posterior oropharyngeal erythema.   Eyes:      General: No scleral icterus.     Extraocular Movements: Extraocular movements intact.      Conjunctiva/sclera: Conjunctivae normal.      Pupils: Pupils are equal, round, and reactive to light.   Neck:      Musculoskeletal: Normal range of motion and neck supple. No neck rigidity or muscular tenderness.   Cardiovascular:      Rate and Rhythm: Normal rate and regular rhythm.      Heart sounds: Normal heart sounds. No murmur.   Pulmonary:      Effort: Pulmonary effort is normal. No respiratory distress.      Breath sounds: No stridor. No wheezing, rhonchi or rales.      Comments: Diminished air entry B/L bases, otherwise clear to auscultation  Chest:      Chest wall: No tenderness.   Abdominal:      General: Bowel sounds are normal. There is no distension.      Palpations: Abdomen is soft. There is no mass.      Tenderness: There is no abdominal tenderness. There is no guarding or rebound.   Musculoskeletal: Normal range of motion.         General: No swelling.      Right lower leg: No edema.      Left lower leg: No edema.   Lymphadenopathy:      Cervical: No cervical adenopathy.   Skin:     General: Skin is warm and dry.      Coloration: Skin is not jaundiced.      Findings: No rash.   Neurological:      General: No focal deficit present.      Mental Status: She is alert. Mental status is at baseline.      Cranial Nerves: No cranial nerve deficit.      Comments: Confused. (+) demented.    Psychiatric:         Mood and Affect: Mood normal.         Behavior: Behavior normal.      Comments: Impaired judgment, circumstantial thought       I have performed the physical examination today 5/20/2020.  In review of yesterday's note, there are no  new changes except as documented above.      Fluids    Intake/Output Summary (Last 24 hours) at 5/20/2020 1147  Last data filed at 5/19/2020 2000  Gross per 24 hour   Intake 120 ml   Output --   Net 120 ml       Laboratory  Recent Labs     05/17/20  2240 05/19/20  0430 05/20/20  0215   WBC 17.0* 9.2 10.6   RBC 3.84* 3.39* 3.31*   HEMOGLOBIN 8.7* 7.7* 7.5*   HEMATOCRIT 31.4* 28.5* 27.2*   MCV 81.8 84.1 82.2   MCH 22.7* 22.7* 22.7*   MCHC 27.7* 27.0* 27.6*   RDW 54.4* 56.7* 56.6*   PLATELETCT 307 254 266   MPV 9.3 9.8 10.0     Recent Labs     05/17/20  2240 05/19/20  0430   SODIUM 139 144   POTASSIUM 4.8 4.0   CHLORIDE 103 108   CO2 19* 24   GLUCOSE 186* 98   BUN 11 10   CREATININE 0.91 0.74   CALCIUM 9.2 8.6     Recent Labs     05/17/20  2240   INR 1.01               Imaging  DX-CHEST-PORTABLE (1 VIEW)   Final Result         Ill-defined left basilar opacity, atelectasis or consolidation.           Assessment/Plan  * CAP (community acquired pneumonia)- (present on admission)  Assessment & Plan  - procalcitonin remains low. Continue IV ceftriaxone to complete 5 days. Completed course of azithromycin. Continue to trend procalcitonin.   - initial COVID-19 test negative, but high risk. Await sputum SARS-CoV2. Maintain on contact and droplet precautions with eye protection.   - continue respiratory support. Keep oxygen support, keep sats >89%.      Sepsis (HCC)- (present on admission)  Assessment & Plan  - This is Sepsis Present on admission. SIRS criteria identified on my evaluation include: Tachycardia, with heart rate greater than 90 BPM and Tachypnea, with respirations greater than 20 per minute. Source is pneumonia.  - continue sepsis protocol, fluid resuscitation ordered per protocol. Continue IV LR at 125cc/hr for now. Lactate has normalized, D/C checks.    -continue IV antibiotics as appropriate for source of sepsis  -While organ dysfunction may be noted elsewhere in this problem list or in the chart, degree of  organ dysfunction does not meet CMS criteria for severe sepsis      Anemia  Assessment & Plan  - likely dilutional from IVF. No gross bleeding. Low iron with normal ferritin, folate, B12.   - continue to monitor Hgb. Restrictive transfusion strategy, transfuse if Hgb drops below 7.     Nausea and vomiting- (present on admission)  Assessment & Plan  - resolved. Continue supportive care with IVF, PRN antiemetics.     Glaucoma- (present on admission)  Assessment & Plan  -Continue home regimen of eyedrops    Hypothyroidism- (present on admission)  Assessment & Plan  - TSH WNL. Continue home synthroid.     Dementia (HCC)- (present on admission)  Assessment & Plan  - continue home regimen of Namenda, Depakote, Seroquel and Remeron.  - high risk for delirium. Frequent re-orientation, reestablish circadian rhythm, encourage familiar faces/family in room, avoid or minimize narcotics/sedatives.        VTE prophylaxis: lovenox SQ

## 2020-05-20 NOTE — PROGRESS NOTES
"PT did not want to eat her lunch. Tech was only able to feed her two pieces of chicken before PT stating \"no more\". PT only ate two spoons full of grain blend before saying \"no more and locking her mouth out\".   "

## 2020-05-21 ENCOUNTER — APPOINTMENT (OUTPATIENT)
Dept: RADIOLOGY | Facility: MEDICAL CENTER | Age: 70
DRG: 871 | End: 2020-05-21
Attending: INTERNAL MEDICINE
Payer: MEDICARE

## 2020-05-21 LAB
BASOPHILS # BLD AUTO: 1.1 % (ref 0–1.8)
BASOPHILS # BLD: 0.09 K/UL (ref 0–0.12)
COVID ORDER STATUS COVID19: NORMAL
D DIMER PPP IA.FEU-MCNC: 1.57 UG/ML (FEU) (ref 0–0.5)
EOSINOPHIL # BLD AUTO: 0.21 K/UL (ref 0–0.51)
EOSINOPHIL NFR BLD: 2.6 % (ref 0–6.9)
ERYTHROCYTE [DISTWIDTH] IN BLOOD BY AUTOMATED COUNT: 54.5 FL (ref 35.9–50)
HCT VFR BLD AUTO: 27.3 % (ref 37–47)
HGB BLD-MCNC: 7.6 G/DL (ref 12–16)
IMM GRANULOCYTES # BLD AUTO: 0.03 K/UL (ref 0–0.11)
IMM GRANULOCYTES NFR BLD AUTO: 0.4 % (ref 0–0.9)
LYMPHOCYTES # BLD AUTO: 2.29 K/UL (ref 1–4.8)
LYMPHOCYTES NFR BLD: 27.8 % (ref 22–41)
MCH RBC QN AUTO: 22.3 PG (ref 27–33)
MCHC RBC AUTO-ENTMCNC: 27.8 G/DL (ref 33.6–35)
MCV RBC AUTO: 80.1 FL (ref 81.4–97.8)
MONOCYTES # BLD AUTO: 0.89 K/UL (ref 0–0.85)
MONOCYTES NFR BLD AUTO: 10.8 % (ref 0–13.4)
MORPHOLOGY BLD-IMP: NORMAL
NEUTROPHILS # BLD AUTO: 4.72 K/UL (ref 2–7.15)
NEUTROPHILS NFR BLD: 57.3 % (ref 44–72)
NRBC # BLD AUTO: 0 K/UL
NRBC BLD-RTO: 0 /100 WBC
PLATELET # BLD AUTO: 261 K/UL (ref 164–446)
PMV BLD AUTO: 9.4 FL (ref 9–12.9)
PROCALCITONIN SERPL-MCNC: <0.05 NG/ML
RBC # BLD AUTO: 3.41 M/UL (ref 4.2–5.4)
SARS-COV-2 RNA RESP QL NAA+PROBE: NOTDETECTED
SPECIMEN SOURCE: NORMAL
WBC # BLD AUTO: 8.2 K/UL (ref 4.8–10.8)

## 2020-05-21 PROCEDURE — C9803 HOPD COVID-19 SPEC COLLECT: HCPCS | Performed by: INTERNAL MEDICINE

## 2020-05-21 PROCEDURE — 700111 HCHG RX REV CODE 636 W/ 250 OVERRIDE (IP): Performed by: INTERNAL MEDICINE

## 2020-05-21 PROCEDURE — 700102 HCHG RX REV CODE 250 W/ 637 OVERRIDE(OP): Performed by: HOSPITALIST

## 2020-05-21 PROCEDURE — 85379 FIBRIN DEGRADATION QUANT: CPT

## 2020-05-21 PROCEDURE — A9270 NON-COVERED ITEM OR SERVICE: HCPCS | Performed by: INTERNAL MEDICINE

## 2020-05-21 PROCEDURE — U0004 COV-19 TEST NON-CDC HGH THRU: HCPCS

## 2020-05-21 PROCEDURE — 71045 X-RAY EXAM CHEST 1 VIEW: CPT

## 2020-05-21 PROCEDURE — 770020 HCHG ROOM/CARE - TELE (206)

## 2020-05-21 PROCEDURE — 99233 SBSQ HOSP IP/OBS HIGH 50: CPT | Performed by: INTERNAL MEDICINE

## 2020-05-21 PROCEDURE — 700102 HCHG RX REV CODE 250 W/ 637 OVERRIDE(OP): Performed by: INTERNAL MEDICINE

## 2020-05-21 PROCEDURE — 85025 COMPLETE CBC W/AUTO DIFF WBC: CPT

## 2020-05-21 PROCEDURE — 700117 HCHG RX CONTRAST REV CODE 255: Performed by: INTERNAL MEDICINE

## 2020-05-21 PROCEDURE — 71275 CT ANGIOGRAPHY CHEST: CPT

## 2020-05-21 PROCEDURE — A9270 NON-COVERED ITEM OR SERVICE: HCPCS | Performed by: HOSPITALIST

## 2020-05-21 PROCEDURE — 36415 COLL VENOUS BLD VENIPUNCTURE: CPT

## 2020-05-21 PROCEDURE — 700105 HCHG RX REV CODE 258: Performed by: INTERNAL MEDICINE

## 2020-05-21 PROCEDURE — 84145 PROCALCITONIN (PCT): CPT

## 2020-05-21 RX ADMIN — DIVALPROEX SODIUM 125 MG: 125 CAPSULE ORAL at 18:21

## 2020-05-21 RX ADMIN — ASPIRIN 81 MG 81 MG: 81 TABLET ORAL at 04:46

## 2020-05-21 RX ADMIN — ENOXAPARIN SODIUM 80 MG: 80 INJECTION SUBCUTANEOUS at 20:01

## 2020-05-21 RX ADMIN — FAMOTIDINE 20 MG: 20 TABLET ORAL at 04:46

## 2020-05-21 RX ADMIN — QUETIAPINE FUMARATE 100 MG: 100 TABLET ORAL at 18:21

## 2020-05-21 RX ADMIN — DIVALPROEX SODIUM 125 MG: 125 CAPSULE ORAL at 04:46

## 2020-05-21 RX ADMIN — RALOXIFENE 60 MG: 60 TABLET ORAL at 04:46

## 2020-05-21 RX ADMIN — CEFTRIAXONE SODIUM 2 G: 2 INJECTION, POWDER, FOR SOLUTION INTRAMUSCULAR; INTRAVENOUS at 04:40

## 2020-05-21 RX ADMIN — FAMOTIDINE 20 MG: 20 TABLET ORAL at 18:21

## 2020-05-21 RX ADMIN — ENOXAPARIN SODIUM 40 MG: 100 INJECTION SUBCUTANEOUS at 04:45

## 2020-05-21 RX ADMIN — SODIUM CHLORIDE, POTASSIUM CHLORIDE, SODIUM LACTATE AND CALCIUM CHLORIDE: 600; 310; 30; 20 INJECTION, SOLUTION INTRAVENOUS at 06:36

## 2020-05-21 RX ADMIN — TRAZODONE HYDROCHLORIDE 50 MG: 100 TABLET ORAL at 20:42

## 2020-05-21 RX ADMIN — MEMANTINE HYDROCHLORIDE 10 MG: 10 TABLET ORAL at 04:46

## 2020-05-21 RX ADMIN — ATORVASTATIN CALCIUM 20 MG: 20 TABLET, FILM COATED ORAL at 20:42

## 2020-05-21 RX ADMIN — MEMANTINE HYDROCHLORIDE 10 MG: 10 TABLET ORAL at 18:21

## 2020-05-21 RX ADMIN — LEVOTHYROXINE SODIUM 50 MCG: 50 TABLET ORAL at 04:46

## 2020-05-21 RX ADMIN — MIRTAZAPINE 15 MG: 15 TABLET, FILM COATED ORAL at 20:42

## 2020-05-21 RX ADMIN — DOCUSATE SODIUM 50 MG AND SENNOSIDES 8.6 MG 2 TABLET: 8.6; 5 TABLET, FILM COATED ORAL at 18:21

## 2020-05-21 RX ADMIN — IOHEXOL 60 ML: 350 INJECTION, SOLUTION INTRAVENOUS at 16:52

## 2020-05-21 RX ADMIN — CITALOPRAM HYDROBROMIDE 20 MG: 20 TABLET ORAL at 04:46

## 2020-05-21 RX ADMIN — LATANOPROST 1 DROP: 50 SOLUTION OPHTHALMIC at 21:02

## 2020-05-21 NOTE — PROGRESS NOTES
Notified pt's guardian Linda El at 1440 and got phone consent for CT chest with contrast per MD, with 2nd RN Ursula as 2nd witness.

## 2020-05-21 NOTE — PROGRESS NOTES
Spoke to Tal, Charge RN in the ED, and asked if there was any way he could send someone to put in an ultrasound guided IV for T212.

## 2020-05-21 NOTE — PROGRESS NOTES
Report received from ERI Greer.  Patient resting in bed after finishing her dinner.  Patient A & O to self only.  No apparent signs of distress.  Safety precautions in place.  Patient educated to call for assistance.  Will continue to monitor.

## 2020-05-21 NOTE — PROGRESS NOTES
Report given to Shannan HWANG. Pt is going to be transferred to S170. Pt left the unit at 1632 via bed with transport going for CT and then to S170 without incident. Notified pt's guardian Linda El of pt's transfer at 1632.

## 2020-05-21 NOTE — PROGRESS NOTES
Report received. Assumed care. Pt in bed awake, confused.VSS.  Denies pain, SOB. Assessment complete. Right wrist restraint in place, verified placement, and order. Explained importance of calling before getting OOB. Call light and belongings within reach. Bed alarm on. Bed in the lowest position. Treaded socks in place. Hourly rounding in progress. Will continue to monitor .

## 2020-05-21 NOTE — PROGRESS NOTES
PT ate one whole pork piece, half of her mash potatoes and 1/4 of the milk provided. PT would not eat carrots. PT stated that she does not want anymore twice before the tech stopped feeding her as she is confused and tech tried to feed PT her carrots for nutrients.

## 2020-05-21 NOTE — CARE PLAN
Problem: Communication  Goal: The ability to communicate needs accurately and effectively will improve  Outcome: PROGRESSING AS EXPECTED     Problem: Safety  Goal: Will remain free from falls  Outcome: PROGRESSING AS EXPECTED  Note: Pt educated on safety precautions, utilization of the call light, and bed alarm.  Pt verbalized understanding.      Problem: Infection  Goal: Will remain free from infection  Outcome: PROGRESSING AS EXPECTED  Note: Implement standard precautions and perform handwashing before and after patient contact. RN will follow protocols and necessary steps to minimize the spread of infection.  RN educated pt and any visitors on proper hand hygiene.      Problem: Respiratory:  Goal: Respiratory status will improve  Outcome: PROGRESSING AS EXPECTED     Problem: Safety - Medical Restraint  Goal: Remains free of injury from restraints (Restraint for Interference with Medical Device)  Description: INTERVENTIONS:  1. Determine that other, less restrictive measures have been tried or would not be effective before applying the restraint  2. Evaluate the patient's condition at the time of restraint application  3. Inform patient/family regarding the reason for restraint  4. Q2H: Monitor safety, psychosocial status, comfort, nutrition and hydration  Outcome: PROGRESSING AS EXPECTED     Problem: Safety - Medical Restraint  Goal: Free from restraint(s) (Restraint for Interference with Medical Device)  Description: INTERVENTIONS:  1. ONCE/SHIFT or MINIMUM Q12H: Assess and document the continuing need for restraints  2. Q24H: Continued use of restraint requires LIP to perform face to face examination and written order  3. Identify and implement measures to help patient regain control  Outcome: NOT MET

## 2020-05-21 NOTE — PROGRESS NOTES
[facility] Medicine Daily Progress Note    Date of Service  5/21/2020    Chief Complaint  N/V    Hospital Course    69 y.o. female with dementia, who lives in an assisted living facility, admitted 5/17/2020 with nausea, and vomiting. She was also found to be hypoxic in the ED (3L O2 by NC). CXR showed left basilar opacity. WBC count was 17. COVID-19 was checked and was negative. Her 1st procalcitonin was low. She fulfilled sepsis criteria, and was started on IVF in addition to antibiotics. WBC count has normalized. Hgb dropped 7.7 which was felt to be due to dilution from IVF, with no evidence of gross bleeding. Sputum COVID was ordered. Her lactate has improved with IVF. Procalcitonin remained low.           Interval Problem Update  5/21/2020 - I reviewed the patient's chart. There were no significant overnight events. Remains hemodynamically stable and afebrile. Stable on 3L O2 NC.  No leukocytosis. Hgb stable. Procalcitonin remains low x 3. She is completing 5 days of antibiotics. Completed course of azithromycin. Pending sputum COVID.     > I have personally seen and examined the patient today. She remains confused, but answers questions. Required restraints last night as taking off oxygen, but off restraints this morning. No complaints of SOB, or cough. No nausea or vomiting. No abd pain. No fevers or chills.      Consultants/Specialty  none    Code Status  DNR/DNI    Disposition  Monitor on the CDU    Review of Systems  ROS     Pertinent positives/negatives as mentioned above.     A complete review of systems was personally done by me, limited by dementia/confusion. All other systems were negative.       Physical Exam  Temp:  [36.2 °C (97.2 °F)-36.7 °C (98 °F)] 36.5 °C (97.7 °F)  Pulse:  [83-91] 83  Resp:  [16-20] 18  BP: (129-141)/(57-96) 137/72  SpO2:  [88 %-93 %] 91 %    Physical Exam  Vitals signs reviewed.   Constitutional:       General: She is not in acute distress.     Appearance: Normal appearance. She  is normal weight. She is not ill-appearing or diaphoretic.   HENT:      Head: Normocephalic and atraumatic.      Right Ear: External ear normal.      Left Ear: External ear normal.      Nose: Nose normal. No congestion or rhinorrhea.      Mouth/Throat:      Mouth: Mucous membranes are moist.      Pharynx: No oropharyngeal exudate or posterior oropharyngeal erythema.   Eyes:      General: No scleral icterus.     Extraocular Movements: Extraocular movements intact.      Conjunctiva/sclera: Conjunctivae normal.      Pupils: Pupils are equal, round, and reactive to light.   Neck:      Musculoskeletal: Normal range of motion and neck supple. No neck rigidity or muscular tenderness.   Cardiovascular:      Rate and Rhythm: Normal rate and regular rhythm.      Heart sounds: Normal heart sounds. No murmur.   Pulmonary:      Effort: Pulmonary effort is normal. No respiratory distress.      Breath sounds: No stridor. No wheezing, rhonchi or rales.      Comments: Diminished air entry B/L bases, otherwise clear to auscultation  Chest:      Chest wall: No tenderness.   Abdominal:      General: Bowel sounds are normal. There is no distension.      Palpations: Abdomen is soft. There is no mass.      Tenderness: There is no abdominal tenderness. There is no guarding or rebound.   Musculoskeletal: Normal range of motion.         General: No swelling.      Right lower leg: No edema.      Left lower leg: No edema.   Lymphadenopathy:      Cervical: No cervical adenopathy.   Skin:     General: Skin is warm and dry.      Coloration: Skin is not jaundiced.      Findings: No rash.   Neurological:      General: No focal deficit present.      Mental Status: She is alert. Mental status is at baseline.      Cranial Nerves: No cranial nerve deficit.      Comments: Confused. (+) demented.    Psychiatric:         Mood and Affect: Mood normal.         Behavior: Behavior normal.      Comments: Impaired judgment, circumstantial thought       I have  performed the physical examination today 5/21/2020.  In review of yesterday's note, there are no new changes except as documented above.        Fluids    Intake/Output Summary (Last 24 hours) at 5/21/2020 1239  Last data filed at 5/21/2020 0636  Gross per 24 hour   Intake 120 ml   Output 1450 ml   Net -1330 ml       Laboratory  Recent Labs     05/19/20  0430 05/20/20  0215 05/21/20  0240   WBC 9.2 10.6 8.2   RBC 3.39* 3.31* 3.41*   HEMOGLOBIN 7.7* 7.5* 7.6*   HEMATOCRIT 28.5* 27.2* 27.3*   MCV 84.1 82.2 80.1*   MCH 22.7* 22.7* 22.3*   MCHC 27.0* 27.6* 27.8*   RDW 56.7* 56.6* 54.5*   PLATELETCT 254 266 261   MPV 9.8 10.0 9.4     Recent Labs     05/19/20  0430   SODIUM 144   POTASSIUM 4.0   CHLORIDE 108   CO2 24   GLUCOSE 98   BUN 10   CREATININE 0.74   CALCIUM 8.6                   Imaging  DX-CHEST-PORTABLE (1 VIEW)   Final Result         Ill-defined left basilar opacity, atelectasis or consolidation.      DX-CHEST-PORTABLE (1 VIEW)    (Results Pending)        Assessment/Plan  * CAP (community acquired pneumonia)- (present on admission)  Assessment & Plan  - procalcitonin remains low. Continue IV ceftriaxone to complete 5 days (Day 4 today, until 5/22/20). Completed course of azithromycin.   - initial COVID-19 test negative, but high risk. Unable to produce sputum for SARS-CoV2. Will repeat rapid COVID PCR. Maintain on contact and droplet precautions with eye protection.    - repeat CXR. Check d dimer.  - continue respiratory support. Keep oxygen support, keep sats >89%.      Sepsis (HCC)- (present on admission)  Assessment & Plan  - This is Sepsis Present on admission. SIRS criteria identified on my evaluation include: Tachycardia, with heart rate greater than 90 BPM and Tachypnea, with respirations greater than 20 per minute. Source is pneumonia.  - continue sepsis protocol, fluid resuscitation ordered per protocol. Continue IV LR at 125cc/hr for now. Lactate has normalized, D/C checks.    -continue IV antibiotics  as appropriate for source of sepsis  -While organ dysfunction may be noted elsewhere in this problem list or in the chart, degree of organ dysfunction does not meet CMS criteria for severe sepsis      Anemia  Assessment & Plan  - likely dilutional from IVF. No gross bleeding. Low iron with normal ferritin, folate, B12.   - continue to monitor Hgb. Restrictive transfusion strategy, transfuse if Hgb drops below 7.   - stop IVF.    Nausea and vomiting- (present on admission)  Assessment & Plan  - resolved. Continue supportive care with IVF, PRN antiemetics.     Glaucoma- (present on admission)  Assessment & Plan  -Continue home regimen of eyedrops    Hypothyroidism- (present on admission)  Assessment & Plan  - TSH WNL. Continue home synthroid.     Dementia (HCC)- (present on admission)  Assessment & Plan  - continue home regimen of Namenda, Depakote, Seroquel and Remeron.  - high risk for delirium. Frequent re-orientation, reestablish circadian rhythm, encourage familiar faces/family in room, avoid or minimize narcotics/sedatives.        VTE prophylaxis: lovenox SQ

## 2020-05-21 NOTE — CARE PLAN
Problem: Safety  Goal: Will remain free from injury  Outcome: PROGRESSING AS EXPECTED  Note: Treaded socks in place, bed in the lowest position, bed alarm on, call light and belongings within reach, pt call for assistance appropriately      Problem: Safety - Medical Restraint  Goal: Remains free of injury from restraints (Restraint for Interference with Medical Device)  Description: INTERVENTIONS:  1. Determine that other, less restrictive measures have been tried or would not be effective before applying the restraint  2. Evaluate the patient's condition at the time of restraint application  3. Inform patient/family regarding the reason for restraint  4. Q2H: Monitor safety, psychosocial status, comfort, nutrition and hydration  Outcome: PROGRESSING AS EXPECTED  Note: Right wrist restraint in place, verified placement and order, pt unable to follow commands, pulls nasal cannula off, will continue to monitor

## 2020-05-21 NOTE — PROGRESS NOTES
Notified Dr. Borges of pt's recent chest xray and D dimer results. Received order for CT- chest to r/o PE via phone with read back. Will continue to monitor.

## 2020-05-22 ENCOUNTER — APPOINTMENT (OUTPATIENT)
Dept: RADIOLOGY | Facility: MEDICAL CENTER | Age: 70
DRG: 871 | End: 2020-05-22
Attending: INTERNAL MEDICINE
Payer: MEDICARE

## 2020-05-22 PROBLEM — I26.02 ACUTE SADDLE PULMONARY EMBOLISM WITH ACUTE COR PULMONALE (HCC): Status: ACTIVE | Noted: 2020-05-22

## 2020-05-22 PROBLEM — A41.9 SEPSIS (HCC): Status: RESOLVED | Noted: 2020-01-31 | Resolved: 2020-05-22

## 2020-05-22 LAB
NT-PROBNP SERPL IA-MCNC: 2668 PG/ML (ref 0–125)
TROPONIN T SERPL-MCNC: 42 NG/L (ref 6–19)

## 2020-05-22 PROCEDURE — 84484 ASSAY OF TROPONIN QUANT: CPT

## 2020-05-22 PROCEDURE — 700105 HCHG RX REV CODE 258: Performed by: INTERNAL MEDICINE

## 2020-05-22 PROCEDURE — 770020 HCHG ROOM/CARE - TELE (206)

## 2020-05-22 PROCEDURE — 82270 OCCULT BLOOD FECES: CPT

## 2020-05-22 PROCEDURE — A9270 NON-COVERED ITEM OR SERVICE: HCPCS | Performed by: HOSPITALIST

## 2020-05-22 PROCEDURE — 83880 ASSAY OF NATRIURETIC PEPTIDE: CPT

## 2020-05-22 PROCEDURE — 700111 HCHG RX REV CODE 636 W/ 250 OVERRIDE (IP): Performed by: INTERNAL MEDICINE

## 2020-05-22 PROCEDURE — 99223 1ST HOSP IP/OBS HIGH 75: CPT | Mod: GC | Performed by: INTERNAL MEDICINE

## 2020-05-22 PROCEDURE — 36415 COLL VENOUS BLD VENIPUNCTURE: CPT

## 2020-05-22 PROCEDURE — 700102 HCHG RX REV CODE 250 W/ 637 OVERRIDE(OP): Performed by: HOSPITALIST

## 2020-05-22 PROCEDURE — 99233 SBSQ HOSP IP/OBS HIGH 50: CPT | Performed by: INTERNAL MEDICINE

## 2020-05-22 PROCEDURE — A9270 NON-COVERED ITEM OR SERVICE: HCPCS | Performed by: INTERNAL MEDICINE

## 2020-05-22 PROCEDURE — 700102 HCHG RX REV CODE 250 W/ 637 OVERRIDE(OP): Performed by: INTERNAL MEDICINE

## 2020-05-22 PROCEDURE — 93970 EXTREMITY STUDY: CPT

## 2020-05-22 RX ADMIN — FAMOTIDINE 20 MG: 20 TABLET ORAL at 06:38

## 2020-05-22 RX ADMIN — ENOXAPARIN SODIUM 80 MG: 80 INJECTION SUBCUTANEOUS at 06:38

## 2020-05-22 RX ADMIN — RALOXIFENE 60 MG: 60 TABLET ORAL at 06:39

## 2020-05-22 RX ADMIN — CITALOPRAM HYDROBROMIDE 20 MG: 20 TABLET ORAL at 06:00

## 2020-05-22 RX ADMIN — MEMANTINE HYDROCHLORIDE 10 MG: 10 TABLET ORAL at 06:38

## 2020-05-22 RX ADMIN — MEMANTINE HYDROCHLORIDE 10 MG: 10 TABLET ORAL at 18:14

## 2020-05-22 RX ADMIN — CEFTRIAXONE SODIUM 2 G: 2 INJECTION, POWDER, FOR SOLUTION INTRAMUSCULAR; INTRAVENOUS at 06:38

## 2020-05-22 RX ADMIN — FAMOTIDINE 20 MG: 20 TABLET ORAL at 18:13

## 2020-05-22 RX ADMIN — QUETIAPINE FUMARATE 100 MG: 100 TABLET ORAL at 18:13

## 2020-05-22 RX ADMIN — DOCUSATE SODIUM 50 MG AND SENNOSIDES 8.6 MG 2 TABLET: 8.6; 5 TABLET, FILM COATED ORAL at 06:38

## 2020-05-22 RX ADMIN — SODIUM CHLORIDE 125 MG: 9 INJECTION, SOLUTION INTRAVENOUS at 13:09

## 2020-05-22 RX ADMIN — DIVALPROEX SODIUM 125 MG: 125 CAPSULE ORAL at 18:14

## 2020-05-22 RX ADMIN — DOCUSATE SODIUM 50 MG AND SENNOSIDES 8.6 MG 2 TABLET: 8.6; 5 TABLET, FILM COATED ORAL at 18:13

## 2020-05-22 RX ADMIN — DIVALPROEX SODIUM 125 MG: 125 CAPSULE ORAL at 06:38

## 2020-05-22 RX ADMIN — ASPIRIN 81 MG 81 MG: 81 TABLET ORAL at 06:38

## 2020-05-22 RX ADMIN — LEVOTHYROXINE SODIUM 50 MCG: 50 TABLET ORAL at 06:38

## 2020-05-22 RX ADMIN — ENOXAPARIN SODIUM 80 MG: 80 INJECTION SUBCUTANEOUS at 18:14

## 2020-05-22 RX ADMIN — LATANOPROST 1 DROP: 50 SOLUTION OPHTHALMIC at 18:21

## 2020-05-22 ASSESSMENT — COGNITIVE AND FUNCTIONAL STATUS - GENERAL
TOILETING: TOTAL
CLIMB 3 TO 5 STEPS WITH RAILING: TOTAL
DRESSING REGULAR LOWER BODY CLOTHING: TOTAL
DAILY ACTIVITIY SCORE: 6
EATING MEALS: TOTAL
PERSONAL GROOMING: TOTAL
MOVING TO AND FROM BED TO CHAIR: A LOT
DRESSING REGULAR UPPER BODY CLOTHING: TOTAL
HELP NEEDED FOR BATHING: TOTAL
MOVING FROM LYING ON BACK TO SITTING ON SIDE OF FLAT BED: UNABLE
STANDING UP FROM CHAIR USING ARMS: A LOT
SUGGESTED CMS G CODE MODIFIER DAILY ACTIVITY: CN
TURNING FROM BACK TO SIDE WHILE IN FLAT BAD: A LOT

## 2020-05-22 ASSESSMENT — FIBROSIS 4 INDEX: FIB4 SCORE: 3.78

## 2020-05-22 NOTE — CARE PLAN
Problem: Safety  Goal: Will remain free from falls  Outcome: PROGRESSING AS EXPECTED  Intervention: Implement fall precautions  Flowsheets  Taken 5/22/2020 1000  Bed Alarm: Yes - Alarm On  Bedrails: Alternative to Bedrails Used  Chair/Bed Strip Alarm: Yes - Alarm On  Taken 5/22/2020 0900  Environmental Precautions:   Treaded Slipper Socks on Patient   Personal Belongings, Wastebasket, Call Bell etc. in Easy Reach   Transferred to Stronger Side   Report Given to Other Health Care Providers Regarding Fall Risk   Bed in Low Position   Communication Sign for Patients & Families   Mobility Assessed & Appropriate Sign Placed     Problem: Venous Thromboembolism (VTW)/Deep Vein Thrombosis (DVT) Prevention:  Goal: Patient will participate in Venous Thrombosis (VTE)/Deep Vein Thrombosis (DVT)Prevention Measures  Outcome: PROGRESSING AS EXPECTED  Intervention: Assess and monitor for anticoagulation complications  Note: Patient on SQ lovenox

## 2020-05-22 NOTE — H&P
Pulmonary Consult Note    Reason for Consult:  Asked by Dr Magdiel Villeda M.D. to see this patient with  Pulmonary embolism    Patient's PCP: Noni Velásquez M.D.    CC: Shortness of breath  Chief Complaint   Patient presents with   • N/V       HPI: This is a 70 yo F who was brought to the hospital from group home for oxygen requirement. The patient has severe dementia and the patient is not a good historian. She is not alert and oriented. She was found to have Extensive bilateral pulmonary emboli with saddle embolus. The patient is hemodynamically stable.     Past Medical History:   Diagnosis Date   • Dementia (HCC)    • Dementia (HCC)        No past surgical history on file.    No family history on file.  Patient family history was personally reviewed, no pertinent family history to current presentation    Social History     Socioeconomic History   • Marital status:      Spouse name: Not on file   • Number of children: Not on file   • Years of education: Not on file   • Highest education level: Not on file   Occupational History   • Not on file   Social Needs   • Financial resource strain: Not on file   • Food insecurity     Worry: Not on file     Inability: Not on file   • Transportation needs     Medical: Not on file     Non-medical: Not on file   Tobacco Use   • Smoking status: Never Smoker   • Smokeless tobacco: Never Used   Substance and Sexual Activity   • Alcohol use: No   • Drug use: No   • Sexual activity: Not on file   Lifestyle   • Physical activity     Days per week: Not on file     Minutes per session: Not on file   • Stress: Not on file   Relationships   • Social connections     Talks on phone: Not on file     Gets together: Not on file     Attends Rastafari service: Not on file     Active member of club or organization: Not on file     Attends meetings of clubs or organizations: Not on file     Relationship status: Not on file   • Intimate partner violence     Fear of current or ex  partner: Not on file     Emotionally abused: Not on file     Physically abused: Not on file     Forced sexual activity: Not on file   Other Topics Concern   • Not on file   Social History Narrative   • Not on file       ALLERGIES:  Allergies   Allergen Reactions   • Ampicillin Rash       Review of systems:  Review of Systems   Unable to perform ROS: Dementia     Physical exam:  Patient Vitals for the past 24 hrs:   BP Temp Temp src Pulse Resp SpO2 Weight   05/22/20 0931 -- -- -- -- -- -- 73.3 kg (161 lb 9.6 oz)   05/22/20 0735 (!) 96/60 36.2 °C (97.1 °F) Temporal 75 18 97 % --   05/22/20 0400 118/57 36.6 °C (97.9 °F) Temporal 77 15 96 % --   05/22/20 0000 100/79 36.1 °C (96.9 °F) Temporal 95 16 99 % --   05/21/20 2000 100/65 36.8 °C (98.2 °F) Temporal 95 16 95 % --   05/21/20 1246 130/63 36.3 °C (97.4 °F) Temporal 91 16 91 % --     Physical Exam   Constitutional: She is well-developed, well-nourished, and in no distress. No distress.   HENT:   Head: Normocephalic and atraumatic.   Eyes: Pupils are equal, round, and reactive to light. EOM are normal.   Cardiovascular:   Murmur heard.  Pulmonary/Chest: Effort normal. She has no wheezes. She has rales.   Abdominal: Soft. Bowel sounds are normal. There is no abdominal tenderness.   Musculoskeletal:         General: No edema.   Neurological:   Not alert or oriented       Data:  Laboratory studies personally reviewed by me:  Recent Results (from the past 24 hour(s))   COVID/SARS CoV-2    Collection Time: 05/21/20 11:45 AM    Specimen: Nasopharyngeal; Respirate   Result Value Ref Range    COVID Order Status Received    SARS-CoV-2, PCR (In-House)    Collection Time: 05/21/20 11:45 AM   Result Value Ref Range    SARS-CoV-2 Source NP Swab     SARS-CoV-2 by PCR NotDetected NotDetected   D-DIMER    Collection Time: 05/21/20 12:55 PM   Result Value Ref Range    D-Dimer Screen 1.57 (H) 0.00 - 0.50 ug/mL (FEU)       Imaging:  CT-CTA CHEST PULMONARY ARTERY W/ RECONS   Final Result          1. Extensive bilateral pulmonary emboli with saddle embolus.      2. Elevation of the RV/LV ratio.      3. Moderate bilateral pleural effusions with bibasilar opacities, likely atelectasis.      4. Mild to moderate hiatal hernia.      CRITICAL RESULT READ BACK: Preliminary findings discussed with and critical read back performed by Dr. NOHEMY LYNNE via telephone on 5/21/2020 4:58 PM         DX-CHEST-PORTABLE (1 VIEW)   Final Result      1.  Persistent minimal LEFT lung base atelectasis or infiltrate.   2.  Increasing hazy opacity at the RIGHT lung base likely indicates atelectasis.   3.  Possible tiny bilateral pleural effusions, new from prior exam.   4.  No pneumothorax.      DX-CHEST-PORTABLE (1 VIEW)   Final Result         Ill-defined left basilar opacity, atelectasis or consolidation.      EC-ECHOCARDIOGRAM COMPLETE W/O CONT    (Results Pending)   US-EXTREMITY VENOUS LOWER BILAT    (Results Pending)       All pertinent features of laboratory and imaging reviewed including primary images where applicable      Principal Problem:    CAP (community acquired pneumonia) POA: Yes  Active Problems:    Sepsis (HCC) POA: Yes    Nausea and vomiting POA: Yes    Anemia POA: No    Dementia (HCC) POA: Yes    Hypothyroidism POA: Yes    Glaucoma POA: Yes  Resolved Problems:    * No resolved hospital problems. *      Assessment / Plan:  # B/l Pulmonary emboli with saddle embolus  # B/l pleural effusion, moderate  - This patient is hemodynamically stable. Continue lovenox 80 mg BID and switch her to NOAC when possible for 3 months. No need for EKOS or systemic thrombolytics at this time unless she becomes hemodynamically unstable.   - Age appropriate cancer screening is recommended  - ECHO results are still pending.   - Gentle diuresis is okay. Repeat CXR in 4-6 weeks to make sure her effusion has resolved.    It is my pleasure to participate in the care of Ms. Salinas.  Pulmonary Signing off now. Please do not hesitate to  contact me with questions or concerns.    Hortencia Deluca MD MPH  Attending: Dr Zuleika MCDONOUGH

## 2020-05-22 NOTE — CARE PLAN
Problem: Safety  Goal: Will remain free from falls  Outcome: PROGRESSING AS EXPECTED  Intervention: Implement fall precautions  Flowsheets (Taken 5/22/2020 1566)  Bed Alarm: Yes - Alarm On     Problem: Communication  Goal: The ability to communicate needs accurately and effectively will improve  Outcome: PROGRESSING SLOWER THAN EXPECTED  Intervention: Reorient patient to environment as needed  Flowsheets (Taken 5/22/2020 1686)  Oriented to:: Call Light & Bedside Controls

## 2020-05-22 NOTE — PROGRESS NOTES
Bedside shift report received from day shift RN. Assumed care of pt. Pt A&O to self only, confused. R wrist restraint on. Continuous pulse ox connected, pt is on 3LNC. Denies any pain, nausea, or SOB at this time. Tele monitor on. Pt resting in bed with call light within reach. Bed locked and in lowest position with bed alarm in place.

## 2020-05-22 NOTE — ASSESSMENT & PLAN NOTE
Patient is clinically improved continue anticoagulation with Eliquis discussed with  her insurance approves coverage

## 2020-05-22 NOTE — PROGRESS NOTES
[facility] Medicine Daily Progress Note    Date of Service  5/22/2020    Chief Complaint  N/V    Hospital Course    69 y.o. female with dementia, who lives in an assisted living facility, admitted 5/17/2020 with nausea, and vomiting. She was also found to be hypoxic in the ED (3L O2 by NC). CXR showed left basilar opacity. WBC count was 17. COVID-19 was checked and was negative. Her 1st procalcitonin was low. She fulfilled sepsis criteria, and was started on IVF in addition to antibiotics. WBC count has normalized. Hgb dropped 7.7 which was felt to be due to dilution from IVF, with no evidence of gross bleeding. Sputum COVID was ordered. Her lactate has improved with IVF. Procalcitonin remained low.           Interval Problem Update  B/L PE-remains HDS, poor historian so patient cannot provide meaningful answers at this time. ECHO and LE venous duplexes pending.    Dementia-advanced, remains on guardianship.       Consultants/Specialty  none    Code Status  DNR/DNI    Disposition  On tele now    Review of Systems  ROS       Pertinent positives/negatives as mentioned above.     A complete review of systems was personally done by me, limited by dementia/confusion. All other systems were negative.   Unchanged again today      Physical Exam  Temp:  [36.1 °C (96.9 °F)-37 °C (98.6 °F)] 37 °C (98.6 °F)  Pulse:  [75-95] 90  Resp:  [15-18] 17  BP: ()/(57-95) 114/95  SpO2:  [91 %-99 %] 94 %    Physical Exam  Vitals signs reviewed.   Constitutional:       General: She is not in acute distress.     Appearance: Normal appearance. She is normal weight. She is not ill-appearing.      Comments: Pleasantly confused   HENT:      Head: Normocephalic and atraumatic.      Right Ear: External ear normal.      Left Ear: External ear normal.      Nose: Nose normal. No congestion or rhinorrhea.      Mouth/Throat:      Mouth: Mucous membranes are moist.      Pharynx: No oropharyngeal exudate or posterior oropharyngeal erythema.   Eyes:       General:         Right eye: No discharge.         Left eye: No discharge.      Extraocular Movements: Extraocular movements intact.      Conjunctiva/sclera: Conjunctivae normal.      Pupils: Pupils are equal, round, and reactive to light.   Neck:      Musculoskeletal: Normal range of motion and neck supple. No neck rigidity or muscular tenderness.   Cardiovascular:      Rate and Rhythm: Normal rate and regular rhythm.      Heart sounds: Normal heart sounds. No murmur.   Pulmonary:      Effort: Pulmonary effort is normal. No respiratory distress.      Breath sounds: No rales.      Comments: Diminished air entry B/L bases, otherwise clear to auscultation-no apparent change today  Abdominal:      General: Bowel sounds are normal. There is no distension.      Palpations: Abdomen is soft. There is no mass.      Tenderness: There is no abdominal tenderness.   Musculoskeletal: Normal range of motion.         General: No swelling.      Right lower leg: No edema.      Left lower leg: Edema present.      Comments: Non pitting of the LLE    Warm peripherally B/L   Skin:     General: Skin is warm and dry.      Coloration: Skin is not jaundiced.      Findings: No rash.   Neurological:      General: No focal deficit present.      Mental Status: She is alert. Mental status is at baseline.      Cranial Nerves: No cranial nerve deficit.      Comments: Confused. (+) demented.    Psychiatric:         Mood and Affect: Mood normal.         Behavior: Behavior normal.      Comments: Impaired judgment, circumstantial thought               Fluids    Intake/Output Summary (Last 24 hours) at 5/22/2020 1232  Last data filed at 5/22/2020 0652  Gross per 24 hour   Intake --   Output 2150 ml   Net -2150 ml       Laboratory  Recent Labs     05/20/20  0215 05/21/20  0240   WBC 10.6 8.2   RBC 3.31* 3.41*   HEMOGLOBIN 7.5* 7.6*   HEMATOCRIT 27.2* 27.3*   MCV 82.2 80.1*   MCH 22.7* 22.3*   MCHC 27.6* 27.8*   RDW 56.6* 54.5*   PLATELETCT 266 537    MPV 10.0 9.4                       Imaging  CT-CTA CHEST PULMONARY ARTERY W/ RECONS   Final Result         1. Extensive bilateral pulmonary emboli with saddle embolus.      2. Elevation of the RV/LV ratio.      3. Moderate bilateral pleural effusions with bibasilar opacities, likely atelectasis.      4. Mild to moderate hiatal hernia.      CRITICAL RESULT READ BACK: Preliminary findings discussed with and critical read back performed by Dr. NOHEMY LYNNE via telephone on 5/21/2020 4:58 PM         DX-CHEST-PORTABLE (1 VIEW)   Final Result      1.  Persistent minimal LEFT lung base atelectasis or infiltrate.   2.  Increasing hazy opacity at the RIGHT lung base likely indicates atelectasis.   3.  Possible tiny bilateral pleural effusions, new from prior exam.   4.  No pneumothorax.      DX-CHEST-PORTABLE (1 VIEW)   Final Result         Ill-defined left basilar opacity, atelectasis or consolidation.      EC-ECHOCARDIOGRAM COMPLETE W/O CONT    (Results Pending)   US-EXTREMITY VENOUS LOWER BILAT    (Results Pending)        Assessment/Plan  * CAP (community acquired pneumonia)- (present on admission)  Assessment & Plan  - All cultures are NGTD  - initial COVID-19 test negative, but high risk. Unable to produce sputum for SARS-CoV2. Will repeat rapid COVID PCR which is negative x2  -SEE BELOW for CTA chest finsings  -abx's finishing today  - repeat CXR. Check d dimer.  - continue respiratory support. Keep oxygen support, keep sats >89%.      Anemia  Assessment & Plan  - likely an element of dilution, though below her baseline from 2/2020  -need to watch closely given on full strength lovenox with PE diagnosis  -trend daily, conservative transfusion strategy, tx for below 7/21  -Fe studies ok, VItamin studies are ok  -check occult blood    Nausea and vomiting- (present on admission)  Assessment & Plan  - resolved. Continue supportive care with IVF, PRN antiemetics.     Acute saddle pulmonary embolism with acute cor  pulmonale (HCC)- (present on admission)  Assessment & Plan  -CTA confirmed, c/f RV strain  -HDS  -ECHO  -Pulm following  -LE venous duplex, if clot burden is present, strongly consider IVC filter in addition to AC  -continue weight based lovenox, defer transition to DOAC until above issues are sorted out  -continue o2 supplementation  -unknown cause of PE, consider occult malignancy versus prolonged immobility given dementia issues    Glaucoma- (present on admission)  Assessment & Plan  -Continue home regimen of eyedrops    Hypothyroidism- (present on admission)  Assessment & Plan  - TSH WNL. Continue home synthroid.     Dementia (HCC)- (present on admission)  Assessment & Plan  - continue home regimen of Namenda, Depakote, Seroquel and Remeron.  - high risk for delirium. Frequent re-orientation, reestablish circadian rhythm, encourage familiar faces/family in room, avoid or minimize narcotics/sedatives.        VTE prophylaxis: lovenox SQ weight based

## 2020-05-22 NOTE — PROGRESS NOTES
Patient arrived to room by bed. Patient oriented to self. No c/o pain. Patient wearing 3L. R wrist in soft wrist restraint.

## 2020-05-22 NOTE — PROGRESS NOTES
"- patient continued to be hypoxic, requiring 3L O2. Repeat COVID PCR negative. D dimer was elevated, prompting a CTPE. I discussed the CTA results with radiology, which showed \"Extensive bilateral pulmonary emboli with saddle embolus, and elevation of the RV/LV ratio\".   - will start patient on therapeutic lovenox 1 mg/kg q12H, 1st dose now.   - check echocardiogram to evaluate for RV strain.   - patient was never hemodynamically unstable or hypotensive, and remains hemodynamically stable; but given above findings I discussed with pulmonary/critical care (Dr. Rob Wisdom) about indication for thrombolysis. Per Dr. Wisdom, given that she is hemodynamically stable, currently there is no indication for either EKOS or systemic thrombolysis. He recommends full dose anticoagulation, and closely monitoring,  and if with signs of clinical deterioration or hemodynamic instability, to reconsult pulmonary/critical care to reconsider thrombolysis.   - Close hemodynamic monitoring. Transfer to telemetry.    "

## 2020-05-22 NOTE — PROGRESS NOTES
2 RN Skin Check    2 RN skin check complete.   Devices in place: Nasal Cannula.  Skin assessed under devices: yes.  Confirmed pressure ulcers found on: N/A .  New potential pressure ulcers noted on N/A. Wound consult placed No.  The following interventions in place Pillows and Barrier cream.    Patient's coccyx red, but blanchable. Skin intact.

## 2020-05-23 ENCOUNTER — APPOINTMENT (OUTPATIENT)
Dept: CARDIOLOGY | Facility: MEDICAL CENTER | Age: 70
DRG: 871 | End: 2020-05-23
Attending: INTERNAL MEDICINE
Payer: MEDICARE

## 2020-05-23 PROBLEM — I82.412 ACUTE DEEP VEIN THROMBOSIS (DVT) OF FEMORAL VEIN OF LEFT LOWER EXTREMITY (HCC): Status: ACTIVE | Noted: 2020-05-23

## 2020-05-23 PROBLEM — J90 BILATERAL PLEURAL EFFUSION: Status: ACTIVE | Noted: 2020-05-23

## 2020-05-23 LAB
ANION GAP SERPL CALC-SCNC: 9 MMOL/L (ref 7–16)
BACTERIA BLD CULT: NORMAL
BACTERIA BLD CULT: NORMAL
BASOPHILS # BLD AUTO: 1.6 % (ref 0–1.8)
BASOPHILS # BLD: 0.11 K/UL (ref 0–0.12)
BUN SERPL-MCNC: 12 MG/DL (ref 8–22)
CALCIUM SERPL-MCNC: 8.6 MG/DL (ref 8.5–10.5)
CHLORIDE SERPL-SCNC: 114 MMOL/L (ref 96–112)
CO2 SERPL-SCNC: 22 MMOL/L (ref 20–33)
CREAT SERPL-MCNC: 0.71 MG/DL (ref 0.5–1.4)
EOSINOPHIL # BLD AUTO: 0.25 K/UL (ref 0–0.51)
EOSINOPHIL NFR BLD: 3.6 % (ref 0–6.9)
ERYTHROCYTE [DISTWIDTH] IN BLOOD BY AUTOMATED COUNT: 54.6 FL (ref 35.9–50)
GLUCOSE SERPL-MCNC: 97 MG/DL (ref 65–99)
HCT VFR BLD AUTO: 31.7 % (ref 37–47)
HEMOCCULT SP1 STL QL: NEGATIVE
HGB BLD-MCNC: 8.7 G/DL (ref 12–16)
IMM GRANULOCYTES # BLD AUTO: 0.03 K/UL (ref 0–0.11)
IMM GRANULOCYTES NFR BLD AUTO: 0.4 % (ref 0–0.9)
LYMPHOCYTES # BLD AUTO: 2.23 K/UL (ref 1–4.8)
LYMPHOCYTES NFR BLD: 31.9 % (ref 22–41)
MCH RBC QN AUTO: 22.2 PG (ref 27–33)
MCHC RBC AUTO-ENTMCNC: 27.4 G/DL (ref 33.6–35)
MCV RBC AUTO: 80.9 FL (ref 81.4–97.8)
MONOCYTES # BLD AUTO: 0.82 K/UL (ref 0–0.85)
MONOCYTES NFR BLD AUTO: 11.7 % (ref 0–13.4)
MORPHOLOGY BLD-IMP: NORMAL
NEUTROPHILS # BLD AUTO: 3.54 K/UL (ref 2–7.15)
NEUTROPHILS NFR BLD: 50.8 % (ref 44–72)
NRBC # BLD AUTO: 0 K/UL
NRBC BLD-RTO: 0 /100 WBC
PLATELET # BLD AUTO: 276 K/UL (ref 164–446)
PMV BLD AUTO: 9.4 FL (ref 9–12.9)
POTASSIUM SERPL-SCNC: 4 MMOL/L (ref 3.6–5.5)
RBC # BLD AUTO: 3.92 M/UL (ref 4.2–5.4)
SIGNIFICANT IND 70042: NORMAL
SIGNIFICANT IND 70042: NORMAL
SITE SITE: NORMAL
SITE SITE: NORMAL
SODIUM SERPL-SCNC: 145 MMOL/L (ref 135–145)
SOURCE SOURCE: NORMAL
SOURCE SOURCE: NORMAL
WBC # BLD AUTO: 7 K/UL (ref 4.8–10.8)

## 2020-05-23 PROCEDURE — 36415 COLL VENOUS BLD VENIPUNCTURE: CPT

## 2020-05-23 PROCEDURE — A9270 NON-COVERED ITEM OR SERVICE: HCPCS | Performed by: INTERNAL MEDICINE

## 2020-05-23 PROCEDURE — 99233 SBSQ HOSP IP/OBS HIGH 50: CPT | Performed by: INTERNAL MEDICINE

## 2020-05-23 PROCEDURE — 700102 HCHG RX REV CODE 250 W/ 637 OVERRIDE(OP): Performed by: INTERNAL MEDICINE

## 2020-05-23 PROCEDURE — 700105 HCHG RX REV CODE 258: Performed by: INTERNAL MEDICINE

## 2020-05-23 PROCEDURE — 93306 TTE W/DOPPLER COMPLETE: CPT

## 2020-05-23 PROCEDURE — 700111 HCHG RX REV CODE 636 W/ 250 OVERRIDE (IP): Performed by: INTERNAL MEDICINE

## 2020-05-23 PROCEDURE — 700102 HCHG RX REV CODE 250 W/ 637 OVERRIDE(OP): Performed by: HOSPITALIST

## 2020-05-23 PROCEDURE — 85025 COMPLETE CBC W/AUTO DIFF WBC: CPT

## 2020-05-23 PROCEDURE — 770020 HCHG ROOM/CARE - TELE (206)

## 2020-05-23 PROCEDURE — 93306 TTE W/DOPPLER COMPLETE: CPT | Mod: 26 | Performed by: INTERNAL MEDICINE

## 2020-05-23 PROCEDURE — 80048 BASIC METABOLIC PNL TOTAL CA: CPT

## 2020-05-23 PROCEDURE — A9270 NON-COVERED ITEM OR SERVICE: HCPCS | Performed by: HOSPITALIST

## 2020-05-23 RX ADMIN — DIVALPROEX SODIUM 125 MG: 125 CAPSULE ORAL at 05:18

## 2020-05-23 RX ADMIN — SODIUM CHLORIDE 125 MG: 9 INJECTION, SOLUTION INTRAVENOUS at 06:30

## 2020-05-23 RX ADMIN — MEMANTINE HYDROCHLORIDE 10 MG: 10 TABLET ORAL at 17:28

## 2020-05-23 RX ADMIN — DOCUSATE SODIUM 50 MG AND SENNOSIDES 8.6 MG 2 TABLET: 8.6; 5 TABLET, FILM COATED ORAL at 17:28

## 2020-05-23 RX ADMIN — RALOXIFENE 60 MG: 60 TABLET ORAL at 05:18

## 2020-05-23 RX ADMIN — TRAZODONE HYDROCHLORIDE 50 MG: 100 TABLET ORAL at 21:00

## 2020-05-23 RX ADMIN — FAMOTIDINE 20 MG: 20 TABLET ORAL at 05:18

## 2020-05-23 RX ADMIN — MIRTAZAPINE 15 MG: 15 TABLET, FILM COATED ORAL at 21:00

## 2020-05-23 RX ADMIN — FAMOTIDINE 20 MG: 20 TABLET ORAL at 17:28

## 2020-05-23 RX ADMIN — CITALOPRAM HYDROBROMIDE 20 MG: 20 TABLET ORAL at 05:18

## 2020-05-23 RX ADMIN — QUETIAPINE FUMARATE 100 MG: 100 TABLET ORAL at 17:28

## 2020-05-23 RX ADMIN — DIVALPROEX SODIUM 125 MG: 125 CAPSULE ORAL at 17:27

## 2020-05-23 RX ADMIN — APIXABAN 10 MG: 5 TABLET, FILM COATED ORAL at 17:28

## 2020-05-23 RX ADMIN — ATORVASTATIN CALCIUM 20 MG: 20 TABLET, FILM COATED ORAL at 21:00

## 2020-05-23 RX ADMIN — LEVOTHYROXINE SODIUM 50 MCG: 50 TABLET ORAL at 05:18

## 2020-05-23 RX ADMIN — LATANOPROST 1 DROP: 50 SOLUTION OPHTHALMIC at 17:28

## 2020-05-23 RX ADMIN — MEMANTINE HYDROCHLORIDE 10 MG: 10 TABLET ORAL at 05:19

## 2020-05-23 RX ADMIN — ENOXAPARIN SODIUM 80 MG: 80 INJECTION SUBCUTANEOUS at 05:19

## 2020-05-23 ASSESSMENT — COGNITIVE AND FUNCTIONAL STATUS - GENERAL
HELP NEEDED FOR BATHING: TOTAL
MOBILITY SCORE: 7
DAILY ACTIVITIY SCORE: 6
TOILETING: TOTAL
DRESSING REGULAR LOWER BODY CLOTHING: TOTAL
DRESSING REGULAR UPPER BODY CLOTHING: TOTAL
MOVING FROM LYING ON BACK TO SITTING ON SIDE OF FLAT BED: UNABLE
TURNING FROM BACK TO SIDE WHILE IN FLAT BAD: A LOT
EATING MEALS: TOTAL
SUGGESTED CMS G CODE MODIFIER MOBILITY: CM
SUGGESTED CMS G CODE MODIFIER DAILY ACTIVITY: CN
CLIMB 3 TO 5 STEPS WITH RAILING: TOTAL
MOVING TO AND FROM BED TO CHAIR: UNABLE
PERSONAL GROOMING: TOTAL
WALKING IN HOSPITAL ROOM: TOTAL
STANDING UP FROM CHAIR USING ARMS: TOTAL

## 2020-05-23 NOTE — PROGRESS NOTES
Pt is pleasantly confused. Discussed POC, does not seem to understanding. Will continue to educate.     Per MD Villeda, no IVC filter per pulmonary    Echo said to be completed today.

## 2020-05-23 NOTE — ASSESSMENT & PLAN NOTE
- CTA 5/21/2020: saddle PE  - intermediate risk with increased RV/LV ratio, elevated cardiac enzymes, BNP  - Fortunately, HD stable, no tachycardia, -130s  - Provoked given poor immobility status   - + DVT  -Tolerating therapeutic anticoagulation with weight-based Lovenox    Recommendations:  -- TTE wnl, no evidence of RV strain/RHF  -- Hb stable  -- home O2 eval  -- Cont apixiban 10 mg PO BID for total of 7 days (started 5/23) followed by 5 mg BID x 3 months minimum; as this was a provoked VTE due to prolonged immobility that is unlikely to change, consider extended anticoagulation beyond the traditional 3 months provided the risk of bleeding remains low.  -- Age appropriate cancer screening as outpt

## 2020-05-23 NOTE — PROGRESS NOTES
[facility] Medicine Daily Progress Note    Date of Service  5/23/2020    Chief Complaint  N/V    Hospital Course    69 y.o. female with dementia, who lives in an assisted living facility, admitted 5/17/2020 with nausea, and vomiting. She was also found to be hypoxic in the ED (3L O2 by NC). CXR showed left basilar opacity. WBC count was 17. COVID-19 was checked and was negative. Her 1st procalcitonin was low. She fulfilled sepsis criteria, and was started on IVF in addition to antibiotics. WBC count has normalized. Hgb dropped 7.7 which was felt to be due to dilution from IVF, with no evidence of gross bleeding. Sputum COVID was ordered. Her lactate has improved with IVF. Procalcitonin remained low.           Interval Problem Update  B/L PE-remains HDS, switched to eliquis by pulm. No other new issues. ECHO pending still.     Dementia-advanced, remains on guardianship. Out of restraints.        Consultants/Specialty  none    Code Status  DNR/DNI    Disposition  On tele now    Review of Systems  ROS       Pertinent positives/negatives as mentioned above.     A complete review of systems was personally done by me, limited by dementia/confusion. All other systems were negative.   Unchanged again today      Physical Exam  Temp:  [36.1 °C (97 °F)-37.3 °C (99.2 °F)] 36.2 °C (97.1 °F)  Pulse:  [76-92] 86  Resp:  [12-17] 12  BP: (118-136)/(47-99) 122/99  SpO2:  [90 %-98 %] 90 %    Physical Exam  Vitals signs reviewed.   Constitutional:       Appearance: Normal appearance. She is normal weight. She is not ill-appearing.      Comments: Pleasantly confused-no changes noted today   HENT:      Head: Normocephalic and atraumatic.      Right Ear: External ear normal.      Left Ear: External ear normal.      Nose: Nose normal.      Mouth/Throat:      Mouth: Mucous membranes are moist.      Pharynx: No oropharyngeal exudate or posterior oropharyngeal erythema.   Eyes:      General: No scleral icterus.     Extraocular Movements:  Extraocular movements intact.      Conjunctiva/sclera: Conjunctivae normal.      Pupils: Pupils are equal, round, and reactive to light.   Neck:      Musculoskeletal: Normal range of motion and neck supple. No neck rigidity or muscular tenderness.   Cardiovascular:      Rate and Rhythm: Normal rate and regular rhythm.      Heart sounds: Normal heart sounds. No murmur.   Pulmonary:      Effort: Pulmonary effort is normal. No respiratory distress.      Breath sounds: No rales.      Comments: Diminished air entry B/L bases, otherwise clear to auscultation-no apparent change today  Abdominal:      General: Bowel sounds are normal. There is no distension.      Palpations: Abdomen is soft.      Tenderness: There is no abdominal tenderness.   Musculoskeletal: Normal range of motion.         General: No swelling.      Right lower leg: No edema.      Left lower leg: Edema (unchanged, warm peripherally) present.      Comments: Non pitting of the LLE    Warm peripherally B/L   Skin:     General: Skin is warm and dry.      Coloration: Skin is not jaundiced.      Findings: No rash.   Neurological:      General: No focal deficit present.      Mental Status: She is alert. Mental status is at baseline.      Cranial Nerves: No cranial nerve deficit.      Comments: Confused. (+) demented.    Psychiatric:         Mood and Affect: Mood normal.         Behavior: Behavior normal.      Comments: Impaired judgment, circumstantial thought               Fluids    Intake/Output Summary (Last 24 hours) at 5/23/2020 1203  Last data filed at 5/23/2020 0900  Gross per 24 hour   Intake 120 ml   Output 300 ml   Net -180 ml       Laboratory  Recent Labs     05/21/20  0240 05/23/20  0302   WBC 8.2 7.0   RBC 3.41* 3.92*   HEMOGLOBIN 7.6* 8.7*   HEMATOCRIT 27.3* 31.7*   MCV 80.1* 80.9*   MCH 22.3* 22.2*   MCHC 27.8* 27.4*   RDW 54.5* 54.6*   PLATELETCT 261 276   MPV 9.4 9.4     Recent Labs     05/23/20  0302   SODIUM 145   POTASSIUM 4.0   CHLORIDE 114*    CO2 22   GLUCOSE 97   BUN 12   CREATININE 0.71   CALCIUM 8.6                   Imaging  US-EXTREMITY VENOUS LOWER BILAT   Final Result      CT-CTA CHEST PULMONARY ARTERY W/ RECONS   Final Result         1. Extensive bilateral pulmonary emboli with saddle embolus.      2. Elevation of the RV/LV ratio.      3. Moderate bilateral pleural effusions with bibasilar opacities, likely atelectasis.      4. Mild to moderate hiatal hernia.      CRITICAL RESULT READ BACK: Preliminary findings discussed with and critical read back performed by Dr. NOHEMY LYNNE via telephone on 5/21/2020 4:58 PM         DX-CHEST-PORTABLE (1 VIEW)   Final Result      1.  Persistent minimal LEFT lung base atelectasis or infiltrate.   2.  Increasing hazy opacity at the RIGHT lung base likely indicates atelectasis.   3.  Possible tiny bilateral pleural effusions, new from prior exam.   4.  No pneumothorax.      DX-CHEST-PORTABLE (1 VIEW)   Final Result         Ill-defined left basilar opacity, atelectasis or consolidation.      EC-ECHOCARDIOGRAM COMPLETE W/O CONT    (Results Pending)        Assessment/Plan  * CAP (community acquired pneumonia)- (present on admission)  Assessment & Plan  - All cultures are NGTD  - initial COVID-19 test negative, but high risk. Unable to produce sputum for SARS-CoV2. Will repeat rapid COVID PCR which is negative x2  -SEE BELOW for CTA chest finsings  -abx's have finished their empiric course, remains stable  - repeat CXR. Check d dimer.  - continue respiratory support. Keep oxygen support, keep sats >89%.      Acute saddle pulmonary embolism with acute cor pulmonale (HCC)- (present on admission)  Assessment & Plan  -CTA confirmed, c/f RV strain  -HDS  -ECHO pending  -Pulm following  -LE venous duplex, if clot burden is present, strongly consider IVC filter in addition to AC, discussed with pulm, no need for IVC filter at this time  -transition to eliquis 10 mg BID, work with CM for affordability  -continue o2  supplementation  -unknown cause of PE, consider occult malignancy versus prolonged immobility given dementia issues    Anemia  Assessment & Plan  - likely an element of dilution, though below her baseline from 2/2020, has improved today to 8.7  -watch closely while on DOAC  -trend daily, conservative transfusion strategy, tx for below 7/21  -Fe studies ok, VItamin studies are ok  -check occult blood    Nausea and vomiting- (present on admission)  Assessment & Plan  - resolved. Continue supportive care with IVF, PRN antiemetics.     Glaucoma- (present on admission)  Assessment & Plan  -Continue home regimen of eyedrops    Hypothyroidism- (present on admission)  Assessment & Plan  - TSH WNL. Continue home synthroid.     Dementia (HCC)- (present on admission)  Assessment & Plan  - continue home regimen of Namenda, Depakote, Seroquel and Remeron.  - high risk for delirium. Frequent re-orientation, reestablish circadian rhythm, encourage familiar faces/family in room, avoid or minimize narcotics/sedatives.        VTE prophylaxis:eliquis

## 2020-05-23 NOTE — PROGRESS NOTES
"Pulmonary Progress Note    Date of admission  5/17/2020    Chief Complaint  Acute hypoxemic respiratory failure    Hospital Course    Sameera Salinas is a very pleasant 69 y.o. female admitted 5/17/2020 with dementia was transferred from her group home for nausea and vomiting.  She was admitted and her hospital course was complicated by shortness of breath and acute hypoxemic respiratory failure, underwent a CT angiogram on 5/21/2020 which identified pulmonary embolism with extensive clot burden bilaterally with a dilated RV/LV ratio, as well as bilateral pleural effusions and bilateral pleural effusions and compressive atelectasis.      Cardiac enzymes and BNP elevated to 2668  Echocardiogram pending.  Ultrasound of the left lower extremity also identified a acute occlusive DVT in the distal femoral vein and popliteal vein.    She was started on therapeutic weight-based Lovenox.      Interval Problem Update  Reviewed last 24 hour events:    Subjectively disoriented to place, time, purpose  When asked in pain, she says \"here\" but cannot elicidate where on her body verbally or with her hands    Remains in sinus rhythm  Rates in the 70s to 90s, no tachycardia  -130s  Supplemental oxygen requirements 2.5-3LPM, sats mid 90s  Hb stable, 8.7  Mild hypernatremia  Echo pending    Review of Systems  Review of Systems   Unable to perform ROS: Mental acuity      Vital Signs for last 24 hours   Temp:  [36.1 °C (97 °F)-37.3 °C (99.2 °F)] 36.1 °C (97 °F)  Pulse:  [75-92] 83  Resp:  [16-18] 17  BP: ()/(47-95) 136/76  SpO2:  [92 %-98 %] 98 %    Physical Exam   Physical Exam  Vitals signs reviewed.   Constitutional:       General: She is not in acute distress.     Appearance: Normal appearance. She is normal weight. She is not ill-appearing, toxic-appearing or diaphoretic.      Comments: Pleasantly confused   HENT:      Head: Normocephalic and atraumatic.      Right Ear: External ear normal.      Left Ear: External " ear normal.      Nose: Nose normal. No congestion or rhinorrhea.      Mouth/Throat:      Mouth: Mucous membranes are moist.      Pharynx: No oropharyngeal exudate or posterior oropharyngeal erythema.   Eyes:      General:         Right eye: No discharge.         Left eye: No discharge.      Extraocular Movements: Extraocular movements intact.      Conjunctiva/sclera: Conjunctivae normal.      Pupils: Pupils are equal, round, and reactive to light.   Neck:      Musculoskeletal: Normal range of motion and neck supple. No neck rigidity or muscular tenderness.   Cardiovascular:      Rate and Rhythm: Normal rate and regular rhythm.      Heart sounds: Normal heart sounds. No murmur.   Pulmonary:      Effort: Pulmonary effort is normal. No respiratory distress.      Breath sounds: No rales.      Comments: Decreased breath sounds bilatearl bases  Abdominal:      General: Bowel sounds are normal. There is no distension.      Palpations: Abdomen is soft. There is no mass.      Tenderness: There is no abdominal tenderness.   Musculoskeletal: Normal range of motion.         General: No swelling.      Right lower leg: No edema.      Left lower leg: Edema present.      Comments: Non pitting of the LLE   Skin:     General: Skin is warm and dry.      Coloration: Skin is not jaundiced.      Findings: No rash.   Neurological:      General: No focal deficit present.      Mental Status: She is alert. Mental status is at baseline.      Cranial Nerves: No cranial nerve deficit.      Comments: Disoriented to time, place, purpose   Psychiatric:         Mood and Affect: Mood normal.      Comments: Calm       Medications  Current Facility-Administered Medications   Medication Dose Route Frequency Provider Last Rate Last Dose   • ferric gluconate complex (FERRLECIT) 125 mg in  mL IVPB  125 mg Intravenous Q24HRS Magdiel Villeda M.D. 100 mL/hr at 05/23/20 0630 125 mg at 05/23/20 0630    Followed by   • [START ON 5/24/2020] ferric  gluconate complex (FERRLECIT) 250 mg in  mL IVPB  250 mg Intravenous Q24HRS Magdiel Villeda M.D.       • enoxaparin (LOVENOX) inj 80 mg  1 mg/kg Subcutaneous Q12HRS Ashwin Borges M.D.   80 mg at 05/23/20 0519   • senna-docusate (PERICOLACE or SENOKOT S) 8.6-50 MG per tablet 2 Tab  2 Tab Oral BID Bertram Mckenzie M.D.   2 Tab at 05/22/20 1813    And   • polyethylene glycol/lytes (MIRALAX) PACKET 1 Packet  1 Packet Oral QDAY PRN Bertram Mckenzie M.D.        And   • magnesium hydroxide (MILK OF MAGNESIA) suspension 30 mL  30 mL Oral QDAY PRN Bertram Mckenzie M.D.        And   • bisacodyl (DULCOLAX) suppository 10 mg  10 mg Rectal QDAY PRN Bertram Mckenzie M.D.       • Respiratory Therapy Consult   Nebulization Continuous RT Bertram Mckenzie M.D.       • acetaminophen (TYLENOL) tablet 650 mg  650 mg Oral Q6HRS PRN Bertram Mckenzie M.D.   650 mg at 05/18/20 2047   • ondansetron (ZOFRAN) syringe/vial injection 4 mg  4 mg Intravenous Q4HRS PRN Bertram Mckenzie M.D.       • ondansetron (ZOFRAN ODT) dispertab 4 mg  4 mg Oral Q4HRS PRN Bertram Mckenzie M.D.       • atorvastatin (LIPITOR) tablet 20 mg  20 mg Oral Nightly Bertram Mckenzie M.D.   20 mg at 05/21/20 2042   • citalopram (CELEXA) tablet 20 mg  20 mg Oral DAILY Bertram Mckenzie M.D.   20 mg at 05/23/20 0518   • divalproex (DEPAKOTE SPRINKLE) capsule 125 mg  125 mg Oral BID Bertram Mckenzie M.D.   125 mg at 05/23/20 0518   • levothyroxine (SYNTHROID) tablet 50 mcg  50 mcg Oral AM ES Bertram Mckenzie M.D.   50 mcg at 05/23/20 0518   • memantine (NAMENDA) tablet 10 mg  10 mg Oral BID Bertram Mckenzie M.D.   10 mg at 05/23/20 0519   • mirtazapine (REMERON) tablet 15 mg  15 mg Oral Nightly Bertram Mckenzie M.D.   15 mg at 05/21/20 2042   • raloxifene (EVISTA) tablet 60 mg  60 mg Oral DAILY Bertram Mckenzie M.D.   60 mg at 05/23/20 0518   • traZODone (DESYREL) tablet 50 mg  50 mg Oral Nightly Bertram Mckenzie M.D.   50 mg at 05/21/20 2042   • QUEtiapine (SEROQUEL) tablet 100 mg  100 mg Oral Q EVENING Bertram Mckenzie M.D.   100  mg at 05/22/20 1813   • latanoprost (XALATAN) 0.005 % ophthalmic solution 1 Drop  1 Drop Both Eyes Q EVENING Bertram Mckenzie M.D.   1 Drop at 05/22/20 1821   • famotidine (PEPCID) tablet 20 mg  20 mg Oral BID Aleida Mckenzie M.D.   20 mg at 05/23/20 0518       Fluids    Intake/Output Summary (Last 24 hours) at 5/23/2020 0635  Last data filed at 5/23/2020 0400  Gross per 24 hour   Intake --   Output 1500 ml   Net -1500 ml       Laboratory          Recent Labs     05/23/20  0302   SODIUM 145   POTASSIUM 4.0   CHLORIDE 114*   CO2 22   BUN 12   CREATININE 0.71   CALCIUM 8.6     Recent Labs     05/23/20  0302   GLUCOSE 97     Recent Labs     05/21/20  0240 05/23/20  0302   WBC 8.2 7.0   NEUTSPOLYS 57.30 50.80   LYMPHOCYTES 27.80 31.90   MONOCYTES 10.80 11.70   EOSINOPHILS 2.60 3.60   BASOPHILS 1.10 1.60     Recent Labs     05/21/20  0240 05/23/20  0302   RBC 3.41* 3.92*   HEMOGLOBIN 7.6* 8.7*   HEMATOCRIT 27.3* 31.7*   PLATELETCT 261 276       Imaging  CT:    Reviewed  Echo:   pending  Ultrasound:  Reviewed  Large saddle pulmonary embolism, bilateral pleural effusions, compressive atelectasis in the bases, enlarged RV/LV ratio    Assessment/Plan    Acute saddle pulmonary embolism with acute cor pulmonale (HCC)- (present on admission)  Assessment & Plan  - CTA 5/21/2020: saddle PE  - intermediate risk with increased RV/LV ratio, elevated cardiac enzymes, BNP  - Fortunately, HD stable, no tachycardia, -130s  - Provoked given poor immobility status   - + DVT  -Tolerating therapeutic anticoagulation with weight-based Lovenox    Recommendations:  -- No role for EKOS  -- f/u TTE  -- DC LMWH, start apixiban 10 mg PO BID x 7 days followed by 5 mg BID. First dose 6-12 hours following the last dose of SQ LWMH.  -- plan for therapeutic anticoagulation x 3 months minimum; as this was a provoked VTE due to prolonged immobility that is unlikely to change, consider extended anticoagulation beyond the traditional 3 months provided the  risk of bleeding remains low.  -- Early ambulation does not promote embolization and should be encouraged with PT assistance.  -- Age appropriate cancer screening    Acute deep vein thrombosis (DVT) of femoral vein of left lower extremity (HCC)  Assessment & Plan  - no indication for IVC filter given tolerance of therapeutic anticoagulation, cont therapeutic anticoagulation    Bilateral pleural effusion  Assessment & Plan  - pulmonary edema vs acute PE vs decompensated L/R heart failure  - minimize excess fluids, maintain net even/-500cc, strict I/O   - f/u TTE, pending results trial low dose diuretics to a goal fluid balance not to exceed -1L/day    Dementia (HCC)- (present on admission)  Assessment & Plan  - will need to further delineate this prior to discharge,, patient from a group home, which factors into recommendations for outpatient therapeutic anticoagulation    I have performed a physical exam and reviewed and updated ROS and Plan today (5/23/2020). In review of yesterday's note (5/22/2020), there are no changes except as documented above.     Discussed patient condition and risk of morbidity and/or mortality with Hospitalist, RN and Patient    This note was generated using voice recognition software which has a chance of producing errors of grammar and content.  I have made every reasonable attempt to find and correct any errors, but it should be expected that some may not be found prior to finalization of this note.  __________  Pancho Otto MD  Pulmonary and Critical Care Medicine  [facility]

## 2020-05-23 NOTE — ASSESSMENT & PLAN NOTE
- no IVC filter given tolerance of therapeutic anticoagulation  - Early ambulation does not promote embolization and should be encouraged with PT assistance

## 2020-05-23 NOTE — CARE PLAN
Problem: Communication  Goal: The ability to communicate needs accurately and effectively will improve  Outcome: PROGRESSING SLOWER THAN EXPECTED  Note: Not able to verbalize needs effectively     Problem: Safety  Goal: Will remain free from injury  Outcome: PROGRESSING SLOWER THAN EXPECTED  Note: Q2 turns, bed alarm on

## 2020-05-23 NOTE — ASSESSMENT & PLAN NOTE
- likely due to PE and transient RHF, stabilized  - off supplemental O2  - cont to minimize excess fluids, maintain net even/-500cc, strict I/O   - can consider spot dosing lasix 20mg IV, however anticipate the effusions will resolve spontaneously as TTE appears reassuring  - 2v CXR in 1-2 months to ensure resolution

## 2020-05-24 LAB
BASOPHILS # BLD AUTO: 1.2 % (ref 0–1.8)
BASOPHILS # BLD: 0.09 K/UL (ref 0–0.12)
EOSINOPHIL # BLD AUTO: 0.28 K/UL (ref 0–0.51)
EOSINOPHIL NFR BLD: 3.6 % (ref 0–6.9)
ERYTHROCYTE [DISTWIDTH] IN BLOOD BY AUTOMATED COUNT: 56.8 FL (ref 35.9–50)
HCT VFR BLD AUTO: 35.7 % (ref 37–47)
HGB BLD-MCNC: 9.6 G/DL (ref 12–16)
IMM GRANULOCYTES # BLD AUTO: 0.06 K/UL (ref 0–0.11)
IMM GRANULOCYTES NFR BLD AUTO: 0.8 % (ref 0–0.9)
LYMPHOCYTES # BLD AUTO: 2.31 K/UL (ref 1–4.8)
LYMPHOCYTES NFR BLD: 30 % (ref 22–41)
MCH RBC QN AUTO: 22.2 PG (ref 27–33)
MCHC RBC AUTO-ENTMCNC: 26.9 G/DL (ref 33.6–35)
MCV RBC AUTO: 82.6 FL (ref 81.4–97.8)
MONOCYTES # BLD AUTO: 0.81 K/UL (ref 0–0.85)
MONOCYTES NFR BLD AUTO: 10.5 % (ref 0–13.4)
NEUTROPHILS # BLD AUTO: 4.16 K/UL (ref 2–7.15)
NEUTROPHILS NFR BLD: 53.9 % (ref 44–72)
NRBC # BLD AUTO: 0.02 K/UL
NRBC BLD-RTO: 0.3 /100 WBC
PLATELET # BLD AUTO: 311 K/UL (ref 164–446)
PMV BLD AUTO: 9.5 FL (ref 9–12.9)
RBC # BLD AUTO: 4.32 M/UL (ref 4.2–5.4)
WBC # BLD AUTO: 7.7 K/UL (ref 4.8–10.8)

## 2020-05-24 PROCEDURE — 99233 SBSQ HOSP IP/OBS HIGH 50: CPT | Performed by: INTERNAL MEDICINE

## 2020-05-24 PROCEDURE — A9270 NON-COVERED ITEM OR SERVICE: HCPCS | Performed by: HOSPITALIST

## 2020-05-24 PROCEDURE — A9270 NON-COVERED ITEM OR SERVICE: HCPCS | Performed by: INTERNAL MEDICINE

## 2020-05-24 PROCEDURE — 700102 HCHG RX REV CODE 250 W/ 637 OVERRIDE(OP): Performed by: HOSPITALIST

## 2020-05-24 PROCEDURE — 700102 HCHG RX REV CODE 250 W/ 637 OVERRIDE(OP): Performed by: INTERNAL MEDICINE

## 2020-05-24 PROCEDURE — 700105 HCHG RX REV CODE 258: Performed by: INTERNAL MEDICINE

## 2020-05-24 PROCEDURE — 36415 COLL VENOUS BLD VENIPUNCTURE: CPT

## 2020-05-24 PROCEDURE — 85025 COMPLETE CBC W/AUTO DIFF WBC: CPT

## 2020-05-24 PROCEDURE — 770020 HCHG ROOM/CARE - TELE (206)

## 2020-05-24 PROCEDURE — 700111 HCHG RX REV CODE 636 W/ 250 OVERRIDE (IP): Performed by: INTERNAL MEDICINE

## 2020-05-24 RX ADMIN — FAMOTIDINE 20 MG: 20 TABLET ORAL at 05:05

## 2020-05-24 RX ADMIN — MEMANTINE HYDROCHLORIDE 10 MG: 10 TABLET ORAL at 17:26

## 2020-05-24 RX ADMIN — APIXABAN 10 MG: 5 TABLET, FILM COATED ORAL at 05:07

## 2020-05-24 RX ADMIN — MIRTAZAPINE 15 MG: 15 TABLET, FILM COATED ORAL at 20:17

## 2020-05-24 RX ADMIN — LATANOPROST 1 DROP: 50 SOLUTION OPHTHALMIC at 17:25

## 2020-05-24 RX ADMIN — ATORVASTATIN CALCIUM 20 MG: 20 TABLET, FILM COATED ORAL at 20:17

## 2020-05-24 RX ADMIN — MEMANTINE HYDROCHLORIDE 10 MG: 10 TABLET ORAL at 05:05

## 2020-05-24 RX ADMIN — QUETIAPINE FUMARATE 100 MG: 100 TABLET ORAL at 17:27

## 2020-05-24 RX ADMIN — DIVALPROEX SODIUM 125 MG: 125 CAPSULE ORAL at 05:05

## 2020-05-24 RX ADMIN — LEVOTHYROXINE SODIUM 50 MCG: 50 TABLET ORAL at 05:05

## 2020-05-24 RX ADMIN — APIXABAN 10 MG: 5 TABLET, FILM COATED ORAL at 17:27

## 2020-05-24 RX ADMIN — CITALOPRAM HYDROBROMIDE 20 MG: 20 TABLET ORAL at 05:05

## 2020-05-24 RX ADMIN — RALOXIFENE 60 MG: 60 TABLET ORAL at 05:07

## 2020-05-24 RX ADMIN — DIVALPROEX SODIUM 125 MG: 125 CAPSULE ORAL at 17:27

## 2020-05-24 RX ADMIN — TRAZODONE HYDROCHLORIDE 50 MG: 100 TABLET ORAL at 20:17

## 2020-05-24 RX ADMIN — SODIUM CHLORIDE 250 MG: 9 INJECTION, SOLUTION INTRAVENOUS at 06:03

## 2020-05-24 RX ADMIN — FAMOTIDINE 20 MG: 20 TABLET ORAL at 17:26

## 2020-05-24 ASSESSMENT — PAIN SCALES - PAIN ASSESSMENT IN ADVANCED DEMENTIA (PAINAD)
CONSOLABILITY: DISTRACTED OR REASSURED BY VOICE/TOUCH
TOTALSCORE: 1
FACIALEXPRESSION: SMILING OR INEXPRESSIVE
BREATHING: NORMAL
BODYLANGUAGE: RELAXED

## 2020-05-24 ASSESSMENT — PATIENT HEALTH QUESTIONNAIRE - PHQ9
2. FEELING DOWN, DEPRESSED, IRRITABLE, OR HOPELESS: NOT AT ALL
SUM OF ALL RESPONSES TO PHQ9 QUESTIONS 1 AND 2: 0
1. LITTLE INTEREST OR PLEASURE IN DOING THINGS: NOT AT ALL

## 2020-05-24 NOTE — PROGRESS NOTES
Assumed care of patient at bedside report from NOC RN. Updated on POC. Patient currently A & O x 1; on 1 L O2 nasal cannula for comfort; up max assist; without complaints of acute pain. Call light within reach. Whiteboard updated. Fall precautions in place. Bed locked and in lowest position. All questions answered. No other needs indicated at this time.

## 2020-05-24 NOTE — CARE PLAN
Problem: Skin Integrity  Goal: Risk for impaired skin integrity will decrease  Outcome: PROGRESSING AS EXPECTED  Intervention: Implement precautions to protect skin integrity in collaboration with the interdisciplinary team  Flowsheets (Taken 5/24/2020 1000)  Skin Preventative Measures: Pillows in Use for Support / Positioning  Bed Types: Pressure Redistribution Mattress (Atmosair)  Friction Interventions: Draw Sheet / Pad Used for Repositioning  Moisturizers: Moisturizer   Note: Skin breakdown prevention measures in place: q2 turn prompting, encouraged mobilization, moisturizer at bedside, barrier cream offered, pure wick in place     Problem: Communication  Goal: The ability to communicate needs accurately and effectively will improve  Outcome: PROGRESSING SLOWER THAN EXPECTED  Intervention: Reorient patient to environment as needed  Note: Attempted to reorient pt to unit and call light system. Pt easily redirectable but highly confused at this time.

## 2020-05-24 NOTE — PROGRESS NOTES
Monitor Summary: SR 71-93, ME .16, QRS .08, QT .36 with rare PVC & occasional PAC per strip from monitor room

## 2020-05-24 NOTE — PROGRESS NOTES
Monitor summary: SR 78-90, MA 0.16, QRS 0.08, QT 0.34, with rare PVCs ad rare couplets per strip from monitor room.

## 2020-05-24 NOTE — PROGRESS NOTES
"Pulmonary Progress Note    Date of admission  5/17/2020    Chief Complaint  Acute hypoxemic respiratory failure    Hospital Course    Sameera Salinas is a very pleasant 69 y.o. female admitted 5/17/2020 with dementia was transferred from her group home for nausea and vomiting.  She was admitted and her hospital course was complicated by shortness of breath and acute hypoxemic respiratory failure, underwent a CT angiogram on 5/21/2020 which identified pulmonary embolism with extensive clot burden bilaterally with a dilated RV/LV ratio, as well as bilateral pleural effusions and bilateral pleural effusions and compressive atelectasis.      Cardiac enzymes and BNP elevated to 2668  Echocardiogram pending.  Ultrasound of the left lower extremity also identified a acute occlusive DVT in the distal femoral vein and popliteal vein.    She was started on therapeutic weight-based Lovenox.      Interval Problem Update  Reviewed last 24 hour events:    Pt reports to be \"feeling good\", denies any chest pain, dyspnea  Still confused, baseline, asking \"did you get anything for yourself?\", RN reports she is often talking to herself and having conversations with no one else in room    Transitioned from LMWH to apixiban, tolerating    Remains in sinus rhythm, rates in the 70s to 90s, no tachycardia  -130s  Off supplemental O2, 90% on room air at rest  Hb stable  BMP pending  TTE wnl    Review of Systems  Review of Systems   Unable to perform ROS: Mental acuity      Vital Signs for last 24 hours   Temp:  [36.1 °C (97 °F)-37.1 °C (98.8 °F)] 36.4 °C (97.6 °F)  Pulse:  [74-94] 91  Resp:  [12-16] 16  BP: ()/(37-93) 124/59  SpO2:  [90 %-97 %] 90 %    Physical Exam   Physical Exam  Vitals signs reviewed.   Constitutional:       General: She is not in acute distress.     Appearance: Normal appearance. She is normal weight. She is not ill-appearing, toxic-appearing or diaphoretic.      Comments: Pleasantly confused   HENT:     "  Head: Normocephalic and atraumatic.      Right Ear: External ear normal.      Left Ear: External ear normal.      Nose: Nose normal. No congestion or rhinorrhea.      Mouth/Throat:      Mouth: Mucous membranes are moist.      Pharynx: No oropharyngeal exudate or posterior oropharyngeal erythema.   Eyes:      General:         Right eye: No discharge.         Left eye: No discharge.      Extraocular Movements: Extraocular movements intact.      Conjunctiva/sclera: Conjunctivae normal.      Pupils: Pupils are equal, round, and reactive to light.   Neck:      Musculoskeletal: Normal range of motion and neck supple. No neck rigidity or muscular tenderness.   Cardiovascular:      Rate and Rhythm: Normal rate and regular rhythm.      Heart sounds: Normal heart sounds. No murmur.   Pulmonary:      Effort: Pulmonary effort is normal. No respiratory distress.      Breath sounds: No rales.      Comments: Decreased breath sounds bilatearl bases  Abdominal:      General: Bowel sounds are normal. There is no distension.      Palpations: Abdomen is soft. There is no mass.      Tenderness: There is no abdominal tenderness.   Musculoskeletal: Normal range of motion.         General: No swelling.      Right lower leg: No edema.      Left lower leg: Edema present.      Comments: Non pitting of the LLE   Skin:     General: Skin is warm and dry.      Coloration: Skin is not jaundiced.      Findings: No rash.   Neurological:      General: No focal deficit present.      Mental Status: She is alert. Mental status is at baseline.      Cranial Nerves: No cranial nerve deficit.      Comments: Disoriented to time, place, purpose   Psychiatric:         Mood and Affect: Mood normal.      Comments: Calm       Medications  Current Facility-Administered Medications   Medication Dose Route Frequency Provider Last Rate Last Dose   • apixaban (ELIQUIS) tablet 10 mg  10 mg Oral BID Pancho Otto M.D.   10 mg at 05/24/20 0507    Followed by   •  [START ON 5/30/2020] apixaban (ELIQUIS) tablet 5 mg  5 mg Oral BID Pancho Otto M.D.       • Perflutren Protein A Microsph SUSP 3 mL  3 mL Intravenous Once Ashwin Borges M.D.       • ferric gluconate complex (FERRLECIT) 250 mg in  mL IVPB  250 mg Intravenous Q24HRS Magdiel Villeda M.D.   Stopped at 05/24/20 0703   • senna-docusate (PERICOLACE or SENOKOT S) 8.6-50 MG per tablet 2 Tab  2 Tab Oral BID Bertram Mckenzie M.D.   Stopped at 05/24/20 0600    And   • polyethylene glycol/lytes (MIRALAX) PACKET 1 Packet  1 Packet Oral QDAY PRN Bertram Mckenzie M.D.        And   • magnesium hydroxide (MILK OF MAGNESIA) suspension 30 mL  30 mL Oral QDAY PRN Bertram Mckenzie M.D.        And   • bisacodyl (DULCOLAX) suppository 10 mg  10 mg Rectal QDAY PRN Bertram Mckenzie M.D.       • Respiratory Therapy Consult   Nebulization Continuous RT Bertram Mckenzie M.D.       • acetaminophen (TYLENOL) tablet 650 mg  650 mg Oral Q6HRS PRN Bertram Mckenzie M.D.   650 mg at 05/18/20 2047   • ondansetron (ZOFRAN) syringe/vial injection 4 mg  4 mg Intravenous Q4HRS PRN Bertram Mckenzie M.D.       • ondansetron (ZOFRAN ODT) dispertab 4 mg  4 mg Oral Q4HRS PRN Bertram Mckenzie M.D.       • atorvastatin (LIPITOR) tablet 20 mg  20 mg Oral Nightly Bertram Mckenzie M.D.   20 mg at 05/23/20 2100   • citalopram (CELEXA) tablet 20 mg  20 mg Oral DAILY Bertram Mckenzie M.D.   20 mg at 05/24/20 0505   • divalproex (DEPAKOTE SPRINKLE) capsule 125 mg  125 mg Oral BID Bertram Mckenzie M.D.   125 mg at 05/24/20 0505   • levothyroxine (SYNTHROID) tablet 50 mcg  50 mcg Oral AM ES Bertram Mckenzie M.D.   50 mcg at 05/24/20 0505   • memantine (NAMENDA) tablet 10 mg  10 mg Oral BID Bertram Mckenzie M.D.   10 mg at 05/24/20 0505   • mirtazapine (REMERON) tablet 15 mg  15 mg Oral Nightly Bertram Mckenzie M.D.   15 mg at 05/23/20 2100   • raloxifene (EVISTA) tablet 60 mg  60 mg Oral DAILY Bertram Mckenzie M.D.   60 mg at 05/24/20 0507   • traZODone (DESYREL) tablet 50 mg  50 mg Oral Nightly Bertram Mckenzie M.D.    50 mg at 05/23/20 2100   • QUEtiapine (SEROQUEL) tablet 100 mg  100 mg Oral Q EVENING Bertram Mckenzie M.D.   100 mg at 05/23/20 1728   • latanoprost (XALATAN) 0.005 % ophthalmic solution 1 Drop  1 Drop Both Eyes Q EVENING Bertram Mckenzie M.D.   1 Drop at 05/23/20 1728   • famotidine (PEPCID) tablet 20 mg  20 mg Oral BID Aleida Mckenzie M.D.   20 mg at 05/24/20 0505       Fluids    Intake/Output Summary (Last 24 hours) at 5/24/2020 0855  Last data filed at 5/24/2020 0511  Gross per 24 hour   Intake 120 ml   Output 700 ml   Net -580 ml       Laboratory          Recent Labs     05/23/20  0302   SODIUM 145   POTASSIUM 4.0   CHLORIDE 114*   CO2 22   BUN 12   CREATININE 0.71   CALCIUM 8.6     Recent Labs     05/23/20  0302   GLUCOSE 97     Recent Labs     05/23/20  0302 05/24/20  0258   WBC 7.0 7.7   NEUTSPOLYS 50.80 53.90   LYMPHOCYTES 31.90 30.00   MONOCYTES 11.70 10.50   EOSINOPHILS 3.60 3.60   BASOPHILS 1.60 1.20     Recent Labs     05/23/20  0302 05/24/20  0258   RBC 3.92* 4.32   HEMOGLOBIN 8.7* 9.6*   HEMATOCRIT 31.7* 35.7*   PLATELETCT 276 311       Imaging  CT:    Reviewed  Echo:   Reviewed  Ultrasound:  Reviewed  Large saddle pulmonary embolism, bilateral pleural effusions, compressive atelectasis in the bases, enlarged RV/LV ratio    ECHO: reassuring, no TR jet, RV size/fxn wnl    Assessment/Plan    Acute saddle pulmonary embolism with acute cor pulmonale (HCC)- (present on admission)  Assessment & Plan  - CTA 5/21/2020: saddle PE  - intermediate risk with increased RV/LV ratio, elevated cardiac enzymes, BNP  - Fortunately, HD stable, no tachycardia, -130s  - Provoked given poor immobility status   - + DVT  -Tolerating therapeutic anticoagulation with weight-based Lovenox    Recommendations:  -- TTE wnl, no evidence of RV strain/RHF  -- Hb stable  -- home O2 eval  -- Cont apixiban 10 mg PO BID for total of 7 days (started 5/23) followed by 5 mg BID x 3 months minimum; as this was a provoked VTE due to prolonged  immobility that is unlikely to change, consider extended anticoagulation beyond the traditional 3 months provided the risk of bleeding remains low.  -- Age appropriate cancer screening as outpt    Acute deep vein thrombosis (DVT) of femoral vein of left lower extremity (HCC)  Assessment & Plan  - no IVC filter given tolerance of therapeutic anticoagulation  - Early ambulation does not promote embolization and should be encouraged with PT assistance    Bilateral pleural effusion  Assessment & Plan  - likely due to PE and transient RHF, stabilized  - off supplemental O2  - cont to minimize excess fluids, maintain net even/-500cc, strict I/O   - can consider spot dosing lasix 20mg IV, however anticipate the effusions will resolve spontaneously as TTE appears reassuring  - 2v CXR in 1-2 months to ensure resolution    Dementia (HCC)- (present on admission)  Assessment & Plan  - will need to further delineate this prior to discharge, patient from a group home    We will sign off. Please do not hesitate to contact us if we can be of any further assistance. I am most easily reached via [employer] secure messaging application.    I have performed a physical exam and reviewed and updated ROS and Plan today (5/24/2020). In review of yesterday's note (5/23/2020), there are no changes except as documented above.     Discussed patient condition and risk of morbidity and/or mortality with Hospitalist, RN and Patient    This note was generated using voice recognition software which has a chance of producing errors of grammar and content.  I have made every reasonable attempt to find and correct any errors, but it should be expected that some may not be found prior to finalization of this note.  __________  Pancho Otto MD  Pulmonary and Critical Care Medicine  [facility]

## 2020-05-24 NOTE — CARE PLAN
Problem: Communication  Goal: The ability to communicate needs accurately and effectively will improve  Outcome: PROGRESSING AS EXPECTED   Pt whiteboard updated on plan of care  Pt encouraged to call for assistance  Pt encouraged to voice any concerns or questions regarding care plan  Pt updated on plan of care as it develops and changes    Problem: Safety  Goal: Will remain free from injury  Outcome: PROGRESSING AS EXPECTED   Treaded socks in use  Call light within reach and patient calls appropriately  Medication education provided prior to administration  Medications administered as ordered  Bed alarm on and bed locked in lowest position

## 2020-05-24 NOTE — PROGRESS NOTES
[facility] Medicine Daily Progress Note    Date of Service  5/24/2020    Chief Complaint  N/V    Hospital Course    69 y.o. female with dementia, who lives in an assisted living facility, admitted 5/17/2020 with nausea, and vomiting. She was also found to be hypoxic in the ED (3L O2 by NC). CXR showed left basilar opacity. WBC count was 17. COVID-19 was checked and was negative. Her 1st procalcitonin was low. She fulfilled sepsis criteria, and was started on IVF in addition to antibiotics. WBC count has normalized. Hgb dropped 7.7 which was felt to be due to dilution from IVF, with no evidence of gross bleeding. Sputum COVID was ordered. Her lactate has improved with IVF. Procalcitonin remained low.           Interval Problem Update  B/L PE-remains HDS, tolerating eliquis at full treatment dose without issue. No change in oxygen needs. ECHO was normal.     Dementia-advanced, remains on guardianship. Out of restraints.  No changes.      Consultants/Specialty  none    Code Status  DNR/DNI    Disposition  On tele now    Review of Systems  ROS       Pertinent positives/negatives as mentioned above.     A complete review of systems was personally done by me, limited by dementia/confusion. All other systems were negative.   Unchanged again today      Physical Exam  Temp:  [36.1 °C (97 °F)-37.1 °C (98.8 °F)] 36.4 °C (97.6 °F)  Pulse:  [74-94] 91  Resp:  [14-16] 16  BP: ()/(37-93) 124/59  SpO2:  [90 %-97 %] 90 %    Physical Exam  Vitals signs reviewed.   Constitutional:       Appearance: Normal appearance. She is normal weight. She is not ill-appearing.      Comments: Pleasantly confused-no changes noted today   HENT:      Head: Normocephalic and atraumatic.      Right Ear: External ear normal.      Left Ear: External ear normal.      Nose: Nose normal.      Mouth/Throat:      Mouth: Mucous membranes are moist.      Pharynx: No oropharyngeal exudate or posterior oropharyngeal erythema.   Eyes:      General: No scleral  icterus.     Extraocular Movements: Extraocular movements intact.      Conjunctiva/sclera: Conjunctivae normal.      Pupils: Pupils are equal, round, and reactive to light.   Neck:      Musculoskeletal: Normal range of motion and neck supple. No neck rigidity or muscular tenderness.   Cardiovascular:      Rate and Rhythm: Normal rate and regular rhythm.      Heart sounds: Normal heart sounds. No murmur.   Pulmonary:      Effort: Pulmonary effort is normal. No respiratory distress.      Breath sounds: No rales.      Comments: Diminished air entry B/L bases, otherwise clear to auscultation  Abdominal:      General: Bowel sounds are normal. There is no distension.      Palpations: Abdomen is soft.      Tenderness: There is no abdominal tenderness.   Musculoskeletal: Normal range of motion.         General: No swelling.      Right lower leg: No edema.      Left lower leg: Edema (unchanged, warm peripherally) present.      Comments: Non pitting of the LLE    Warm peripherally B/L   Skin:     General: Skin is warm and dry.      Coloration: Skin is not jaundiced.      Findings: No rash.   Neurological:      General: No focal deficit present.      Mental Status: She is alert. Mental status is at baseline.      Cranial Nerves: No cranial nerve deficit.      Comments: Confused. (+) demented.    Psychiatric:         Mood and Affect: Mood normal.         Behavior: Behavior normal.      Comments: Impaired judgment, circumstantial thought       No change in physical exam today noted on 5/24/2020        Fluids    Intake/Output Summary (Last 24 hours) at 5/24/2020 1210  Last data filed at 5/24/2020 0511  Gross per 24 hour   Intake --   Output 700 ml   Net -700 ml       Laboratory  Recent Labs     05/23/20  0302 05/24/20  0258   WBC 7.0 7.7   RBC 3.92* 4.32   HEMOGLOBIN 8.7* 9.6*   HEMATOCRIT 31.7* 35.7*   MCV 80.9* 82.6   MCH 22.2* 22.2*   MCHC 27.4* 26.9*   RDW 54.6* 56.8*   PLATELETCT 276 311   MPV 9.4 9.5     Recent Labs      05/23/20  0302   SODIUM 145   POTASSIUM 4.0   CHLORIDE 114*   CO2 22   GLUCOSE 97   BUN 12   CREATININE 0.71   CALCIUM 8.6                   Imaging  EC-ECHOCARDIOGRAM COMPLETE W/O CONT   Final Result      US-EXTREMITY VENOUS LOWER BILAT   Final Result      CT-CTA CHEST PULMONARY ARTERY W/ RECONS   Final Result         1. Extensive bilateral pulmonary emboli with saddle embolus.      2. Elevation of the RV/LV ratio.      3. Moderate bilateral pleural effusions with bibasilar opacities, likely atelectasis.      4. Mild to moderate hiatal hernia.      CRITICAL RESULT READ BACK: Preliminary findings discussed with and critical read back performed by Dr. NOHEMY LYNNE via telephone on 5/21/2020 4:58 PM         DX-CHEST-PORTABLE (1 VIEW)   Final Result      1.  Persistent minimal LEFT lung base atelectasis or infiltrate.   2.  Increasing hazy opacity at the RIGHT lung base likely indicates atelectasis.   3.  Possible tiny bilateral pleural effusions, new from prior exam.   4.  No pneumothorax.      DX-CHEST-PORTABLE (1 VIEW)   Final Result         Ill-defined left basilar opacity, atelectasis or consolidation.           Assessment/Plan  * CAP (community acquired pneumonia)- (present on admission)  Assessment & Plan  - All cultures are NGTD  - initial COVID-19 test negative, but high risk. Unable to produce sputum for SARS-CoV2. Will repeat rapid COVID PCR which is negative x2  -SEE BELOW for CTA chest finsings  -abx's have finished their empiric course, remains stable  - repeat CXR. Check d dimer.  - continue respiratory support. Keep oxygen support, keep sats >89%.      Acute saddle pulmonary embolism with acute cor pulmonale (HCC)- (present on admission)  Assessment & Plan  -CTA confirmed, c/f RV strain  -HDS  -ECHO normal  -Pulm following  -LE venous duplex, if clot burden is present, strongly consider IVC filter in addition to AC, discussed with pulm, no need for IVC filter at this time  -transition to eliquis 10 mg  BID, work with CM for affordability-tolerating well  -continue o2 supplementation  -unknown cause of PE, consider occult malignancy versus prolonged immobility given dementia issues  -monitor Na level given possible dehydration, no bleediing issues    Anemia  Assessment & Plan  - likely an element of dilution, though below her baseline from 2/2020, has improved today to 9.6 now  -watch closely while on DOAC  -trend daily, conservative transfusion strategy, tx for below 7/21  -Fe studies ok, VItamin studies are ok  -check occult blood    Nausea and vomiting- (present on admission)  Assessment & Plan  - resolved. Continue supportive care with IVF, PRN antiemetics.     Bilateral pleural effusion- (present on admission)  Assessment & Plan  -ECHO normal, monitor    Glaucoma- (present on admission)  Assessment & Plan  -Continue home regimen of eyedrops    Hypothyroidism- (present on admission)  Assessment & Plan  - TSH WNL. Continue home synthroid.     Dementia (HCC)- (present on admission)  Assessment & Plan  - continue home regimen of Namenda, Depakote, Seroquel and Remeron.  - high risk for delirium. Frequent re-orientation, reestablish circadian rhythm, encourage familiar faces/family in room, avoid or minimize narcotics/sedatives.        VTE prophylaxis:eliquis

## 2020-05-25 LAB
ANION GAP SERPL CALC-SCNC: 8 MMOL/L (ref 7–16)
ANISOCYTOSIS BLD QL SMEAR: ABNORMAL
BASOPHILS # BLD AUTO: 1.3 % (ref 0–1.8)
BASOPHILS # BLD: 0.13 K/UL (ref 0–0.12)
BUN SERPL-MCNC: 11 MG/DL (ref 8–22)
CALCIUM SERPL-MCNC: 8.9 MG/DL (ref 8.5–10.5)
CHLORIDE SERPL-SCNC: 113 MMOL/L (ref 96–112)
CO2 SERPL-SCNC: 21 MMOL/L (ref 20–33)
COMMENT 1642: NORMAL
CREAT SERPL-MCNC: 0.74 MG/DL (ref 0.5–1.4)
EOSINOPHIL # BLD AUTO: 0.31 K/UL (ref 0–0.51)
EOSINOPHIL NFR BLD: 3.2 % (ref 0–6.9)
ERYTHROCYTE [DISTWIDTH] IN BLOOD BY AUTOMATED COUNT: 56.4 FL (ref 35.9–50)
GLUCOSE SERPL-MCNC: 91 MG/DL (ref 65–99)
HCT VFR BLD AUTO: 32.8 % (ref 37–47)
HGB BLD-MCNC: 8.8 G/DL (ref 12–16)
IMM GRANULOCYTES # BLD AUTO: 0.12 K/UL (ref 0–0.11)
IMM GRANULOCYTES NFR BLD AUTO: 1.2 % (ref 0–0.9)
LYMPHOCYTES # BLD AUTO: 2.72 K/UL (ref 1–4.8)
LYMPHOCYTES NFR BLD: 27.8 % (ref 22–41)
MCH RBC QN AUTO: 22.4 PG (ref 27–33)
MCHC RBC AUTO-ENTMCNC: 26.8 G/DL (ref 33.6–35)
MCV RBC AUTO: 83.5 FL (ref 81.4–97.8)
MICROCYTES BLD QL SMEAR: ABNORMAL
MONOCYTES # BLD AUTO: 1.04 K/UL (ref 0–0.85)
MONOCYTES NFR BLD AUTO: 10.6 % (ref 0–13.4)
MORPHOLOGY BLD-IMP: NORMAL
NEUTROPHILS # BLD AUTO: 5.47 K/UL (ref 2–7.15)
NEUTROPHILS NFR BLD: 55.9 % (ref 44–72)
NRBC # BLD AUTO: 0.04 K/UL
NRBC BLD-RTO: 0.4 /100 WBC
OVALOCYTES BLD QL SMEAR: NORMAL
PLATELET # BLD AUTO: 288 K/UL (ref 164–446)
PLATELET BLD QL SMEAR: NORMAL
PMV BLD AUTO: 10 FL (ref 9–12.9)
POIKILOCYTOSIS BLD QL SMEAR: NORMAL
POLYCHROMASIA BLD QL SMEAR: NORMAL
POTASSIUM SERPL-SCNC: 3.5 MMOL/L (ref 3.6–5.5)
RBC # BLD AUTO: 3.93 M/UL (ref 4.2–5.4)
RBC BLD AUTO: PRESENT
SODIUM SERPL-SCNC: 142 MMOL/L (ref 135–145)
WBC # BLD AUTO: 9.8 K/UL (ref 4.8–10.8)

## 2020-05-25 PROCEDURE — A9270 NON-COVERED ITEM OR SERVICE: HCPCS | Performed by: HOSPITALIST

## 2020-05-25 PROCEDURE — 85025 COMPLETE CBC W/AUTO DIFF WBC: CPT

## 2020-05-25 PROCEDURE — 36415 COLL VENOUS BLD VENIPUNCTURE: CPT

## 2020-05-25 PROCEDURE — 94760 N-INVAS EAR/PLS OXIMETRY 1: CPT

## 2020-05-25 PROCEDURE — 700102 HCHG RX REV CODE 250 W/ 637 OVERRIDE(OP): Performed by: INTERNAL MEDICINE

## 2020-05-25 PROCEDURE — 80048 BASIC METABOLIC PNL TOTAL CA: CPT

## 2020-05-25 PROCEDURE — 770020 HCHG ROOM/CARE - TELE (206)

## 2020-05-25 PROCEDURE — A9270 NON-COVERED ITEM OR SERVICE: HCPCS | Performed by: INTERNAL MEDICINE

## 2020-05-25 PROCEDURE — 700111 HCHG RX REV CODE 636 W/ 250 OVERRIDE (IP): Performed by: INTERNAL MEDICINE

## 2020-05-25 PROCEDURE — 99232 SBSQ HOSP IP/OBS MODERATE 35: CPT | Performed by: INTERNAL MEDICINE

## 2020-05-25 PROCEDURE — 700105 HCHG RX REV CODE 258: Performed by: INTERNAL MEDICINE

## 2020-05-25 PROCEDURE — 700102 HCHG RX REV CODE 250 W/ 637 OVERRIDE(OP): Performed by: HOSPITALIST

## 2020-05-25 RX ADMIN — TRAZODONE HYDROCHLORIDE 50 MG: 100 TABLET ORAL at 20:37

## 2020-05-25 RX ADMIN — SODIUM CHLORIDE 250 MG: 9 INJECTION, SOLUTION INTRAVENOUS at 05:55

## 2020-05-25 RX ADMIN — MIRTAZAPINE 15 MG: 15 TABLET, FILM COATED ORAL at 20:37

## 2020-05-25 RX ADMIN — MEMANTINE HYDROCHLORIDE 10 MG: 10 TABLET ORAL at 04:21

## 2020-05-25 RX ADMIN — DIVALPROEX SODIUM 125 MG: 125 CAPSULE ORAL at 18:00

## 2020-05-25 RX ADMIN — FAMOTIDINE 20 MG: 20 TABLET ORAL at 17:28

## 2020-05-25 RX ADMIN — QUETIAPINE FUMARATE 100 MG: 100 TABLET ORAL at 17:27

## 2020-05-25 RX ADMIN — RALOXIFENE 60 MG: 60 TABLET ORAL at 04:21

## 2020-05-25 RX ADMIN — DIVALPROEX SODIUM 125 MG: 125 CAPSULE ORAL at 04:21

## 2020-05-25 RX ADMIN — FAMOTIDINE 20 MG: 20 TABLET ORAL at 04:21

## 2020-05-25 RX ADMIN — ATORVASTATIN CALCIUM 20 MG: 20 TABLET, FILM COATED ORAL at 20:38

## 2020-05-25 RX ADMIN — LATANOPROST 1 DROP: 50 SOLUTION OPHTHALMIC at 17:27

## 2020-05-25 RX ADMIN — MEMANTINE HYDROCHLORIDE 10 MG: 10 TABLET ORAL at 17:27

## 2020-05-25 RX ADMIN — LEVOTHYROXINE SODIUM 50 MCG: 50 TABLET ORAL at 04:21

## 2020-05-25 RX ADMIN — CITALOPRAM HYDROBROMIDE 20 MG: 20 TABLET ORAL at 04:22

## 2020-05-25 RX ADMIN — APIXABAN 10 MG: 5 TABLET, FILM COATED ORAL at 04:21

## 2020-05-25 RX ADMIN — APIXABAN 10 MG: 5 TABLET, FILM COATED ORAL at 17:28

## 2020-05-25 ASSESSMENT — PAIN SCALES - PAIN ASSESSMENT IN ADVANCED DEMENTIA (PAINAD)
CONSOLABILITY: NO NEED TO CONSOLE
BREATHING: NORMAL
TOTALSCORE: 1
CONSOLABILITY: DISTRACTED OR REASSURED BY VOICE/TOUCH
BREATHING: NORMAL
FACIALEXPRESSION: SMILING OR INEXPRESSIVE
FACIALEXPRESSION: SMILING OR INEXPRESSIVE
TOTALSCORE: 0
BODYLANGUAGE: RELAXED
BODYLANGUAGE: RELAXED

## 2020-05-25 NOTE — PROGRESS NOTES
Assumed care of patient at bedside report from NOC RN. Updated on POC. Patient currently A & O x 1, oriented to self; on 2 L O2 nasal cannula; up max assist; without complaints of acute pain. Call light within reach. Whiteboard updated. Fall precautions in place. Bed locked and in lowest position. All questions answered. No other needs indicated at this time.

## 2020-05-25 NOTE — CARE PLAN
Problem: Psychosocial Needs:  Goal: Level of anxiety will decrease  Outcome: PROGRESSING AS EXPECTED  Flowsheets (Taken 5/25/2020 0830)  Patient Behaviors: Confused  Note: Pt is highly confused but pleasant and without signs or symptoms of increased anxiety or emotional distress. Provided emotional support. Provided reorienting and encouragement.     Problem: Communication  Goal: The ability to communicate needs accurately and effectively will improve  Outcome: PROGRESSING SLOWER THAN EXPECTED  Intervention: Reorient patient to environment as needed  Flowsheets (Taken 5/22/2020 0453 by Raisa Bardales R.N.)  Oriented to:: Call Light & Bedside Controls  Note: Pt highly confused, unable to verbalize understanding of situation at this time

## 2020-05-25 NOTE — CARE PLAN
Problem: Communication  Goal: The ability to communicate needs accurately and effectively will improve  Outcome: PROGRESSING AS EXPECTED     Problem: Safety  Goal: Will remain free from injury  Outcome: PROGRESSING AS EXPECTED  Goal: Will remain free from falls  Outcome: PROGRESSING AS EXPECTED     Problem: Infection  Goal: Will remain free from infection  Outcome: PROGRESSING AS EXPECTED     Problem: Psychosocial Needs:  Goal: Level of anxiety will decrease  Outcome: PROGRESSING AS EXPECTED     Problem: Fluid Volume:  Goal: Will maintain balanced intake and output  Outcome: PROGRESSING AS EXPECTED     Problem: Respiratory:  Goal: Respiratory status will improve  Outcome: PROGRESSING AS EXPECTED     Problem: Safety - Medical Restraint  Goal: Remains free of injury from restraints (Restraint for Interference with Medical Device)  Description: INTERVENTIONS:  1. Determine that other, less restrictive measures have been tried or would not be effective before applying the restraint  2. Evaluate the patient's condition at the time of restraint application  3. Inform patient/family regarding the reason for restraint  4. Q2H: Monitor safety, psychosocial status, comfort, nutrition and hydration  Outcome: PROGRESSING AS EXPECTED  Goal: Free from restraint(s) (Restraint for Interference with Medical Device)  Description: INTERVENTIONS:  1. ONCE/SHIFT or MINIMUM Q12H: Assess and document the continuing need for restraints  2. Q24H: Continued use of restraint requires LIP to perform face to face examination and written order  3. Identify and implement measures to help patient regain control  Outcome: PROGRESSING AS EXPECTED     Problem: Venous Thromboembolism (VTW)/Deep Vein Thrombosis (DVT) Prevention:  Goal: Patient will participate in Venous Thrombosis (VTE)/Deep Vein Thrombosis (DVT)Prevention Measures  Outcome: PROGRESSING AS EXPECTED     Problem: Bowel/Gastric:  Goal: Normal bowel function is maintained or  improved  Outcome: PROGRESSING AS EXPECTED  Goal: Will not experience complications related to bowel motility  Outcome: PROGRESSING AS EXPECTED     Problem: Knowledge Deficit  Goal: Knowledge of disease process/condition, treatment plan, diagnostic tests, and medications will improve  Outcome: PROGRESSING AS EXPECTED  Goal: Knowledge of the prescribed therapeutic regimen will improve  Outcome: PROGRESSING AS EXPECTED     Problem: Discharge Barriers/Planning  Goal: Patient's continuum of care needs will be met  Outcome: PROGRESSING AS EXPECTED     Problem: Urinary Elimination:  Goal: Ability to reestablish a normal urinary elimination pattern will improve  Outcome: PROGRESSING AS EXPECTED     Problem: Skin Integrity  Goal: Risk for impaired skin integrity will decrease  Outcome: PROGRESSING AS EXPECTED     Problem: Mobility  Goal: Risk for activity intolerance will decrease  Outcome: PROGRESSING AS EXPECTED

## 2020-05-25 NOTE — CARE PLAN
Problem: Nutritional:  Goal: Achieve adequate nutritional intake  Description: Patient will consume 50-75% of meals consistently   Outcome: NOT MET  Note: See RD note

## 2020-05-25 NOTE — RESPIRATORY CARE
Oxygen Rounds      Patient found on    O2 L/m:  ___2______    Oxygen device:  ____nc____   Spo2: _____92____%      Respiratory device skin site inspection completed.

## 2020-05-25 NOTE — DIETARY
"Nutrition services: Day 7 of admit.  Sameera Salinas is a 69 y.o. female with admitting DX of sepsis, CAP, saddle pulmonary embolus    Potentially poor PO per weekly screen    RD attempted to visit pt at bedside. Pt asleep and di not wake to name call.     Assessment:  Height: 160 cm (5' 3\")  Weight: 73.3 kg (161 lb 9.6 oz)- weight stable   Body mass index is 28.63 kg/m²., BMI classification: Overweight  Diet/Intake: Cardiac, Boost Plus with all meals     Evaluation:   1. Hx of acute saddle pulmonary embolism with acute cor pulmonale, CAP, nausea/vomiting, dementia, acute DVT, hypothyroidism, glaucoma  2. Pt noted poor historian with dementia  3. Per ADLs, pt with some poor PO based on meals charted. Pt consuming on average 46% per last 9 meals charted. Discussed with RN, states pt is full feed assist and needs some encouraging to eat. States just poured pt some Boost and will encourage to consume.   4. Labs: K 3.5  5. Meds: lipitor, depakote, pepcid, ferric gluconate complex-stopped, synthroid, namenda, remeron, senokot-held   6. Last BM: 5/24- small/ brown/ soft/ incontinence    Malnutrition Risk: At risk given potential inadequate intake, though appears poor intake is sporadic. Does not meet criteria at this time.     Recommendations/Plan:  1. Continue Boost Plus TID  2. Encourage intake of meals/ supplements   3. Document intake of all meals/ supplements  as % taken in ADL's to provide interdisciplinary communication across all shifts.   4. Monitor weight.  5. Nutrition rep will continue to see patient for ongoing meal and snack preferences.     RD following         "

## 2020-05-25 NOTE — PROGRESS NOTES
[facility] Medicine Daily Progress Note    Date of Service  5/25/2020    Chief Complaint  N/V    Hospital Course    69 y.o. female with dementia, who lives in an assisted living facility, admitted 5/17/2020 with nausea, and vomiting. She was also found to be hypoxic in the ED (3L O2 by NC). CXR showed left basilar opacity. WBC count was 17. COVID-19 was checked and was negative. Her 1st procalcitonin was low. She fulfilled sepsis criteria, and was started on IVF in addition to antibiotics. WBC count has normalized. Hgb dropped 7.7 which was felt to be due to dilution from IVF, with no evidence of gross bleeding. Sputum COVID was ordered. Her lactate has improved with IVF. Procalcitonin remained low.           Interval Problem Update  B/L PE-remains HDS, tolerating eliquis at full treatment dose without issue. No bleeding, oxygen saturations remains table.     Dementia-advanced, remains on guardianship, remains very confused, poor historian.       Consultants/Specialty  none    Code Status  DNR/DNI    Disposition  On tele now    Review of Systems  ROS       Pertinent positives/negatives as mentioned above.     A complete review of systems was personally done by me, limited by dementia/confusion. All other systems were negative.   Unchanged again today      Physical Exam  Temp:  [36 °C (96.8 °F)-37.2 °C (99 °F)] 36.6 °C (97.8 °F)  Pulse:  [66-91] 79  Resp:  [16-18] 16  BP: (121-170)/() 136/53  SpO2:  [88 %-98 %] 92 %    Physical Exam  Vitals signs reviewed.   Constitutional:       Appearance: Normal appearance. She is normal weight. She is not ill-appearing.      Comments: Pleasantly confused-interactive but non-sensical   HENT:      Head: Normocephalic and atraumatic.      Right Ear: External ear normal.      Left Ear: External ear normal.      Nose: Nose normal.      Mouth/Throat:      Mouth: Mucous membranes are moist.   Eyes:      General:         Right eye: No discharge.         Left eye: No discharge.       Extraocular Movements: Extraocular movements intact.      Conjunctiva/sclera: Conjunctivae normal.      Pupils: Pupils are equal, round, and reactive to light.   Neck:      Musculoskeletal: Normal range of motion and neck supple. No neck rigidity or muscular tenderness.   Cardiovascular:      Rate and Rhythm: Normal rate and regular rhythm.      Heart sounds: Normal heart sounds. No murmur.   Pulmonary:      Effort: Pulmonary effort is normal. No respiratory distress.      Breath sounds: No rales.      Comments: Diminished air entry B/L bases, otherwise clear to auscultation  Abdominal:      General: Bowel sounds are normal.      Palpations: Abdomen is soft.      Tenderness: There is no abdominal tenderness.   Musculoskeletal: Normal range of motion.         General: No swelling.      Right lower leg: No edema.      Left lower leg: Edema (unchanged, warm peripherally) present.      Comments: Non pitting of the LLE-possibly slightly improved today    Warm peripherally B/L   Skin:     General: Skin is warm and dry.      Coloration: Skin is not jaundiced.      Findings: No rash.   Neurological:      General: No focal deficit present.      Mental Status: She is alert. Mental status is at baseline.      Cranial Nerves: No cranial nerve deficit.      Comments: Confused. (+) demented. At her baseline   Psychiatric:         Mood and Affect: Mood normal.         Behavior: Behavior normal.      Comments: Impaired judgment, circumstantial thought               Fluids    Intake/Output Summary (Last 24 hours) at 5/25/2020 1215  Last data filed at 5/25/2020 1000  Gross per 24 hour   Intake 290 ml   Output --   Net 290 ml       Laboratory  Recent Labs     05/23/20  0302 05/24/20  0258 05/25/20  0211   WBC 7.0 7.7 9.8   RBC 3.92* 4.32 3.93*   HEMOGLOBIN 8.7* 9.6* 8.8*   HEMATOCRIT 31.7* 35.7* 32.8*   MCV 80.9* 82.6 83.5   MCH 22.2* 22.2* 22.4*   MCHC 27.4* 26.9* 26.8*   RDW 54.6* 56.8* 56.4*   PLATELETCT 276 311 288   MPV 9.4 9.5  10.0     Recent Labs     05/23/20  0302 05/25/20  0211   SODIUM 145 142   POTASSIUM 4.0 3.5*   CHLORIDE 114* 113*   CO2 22 21   GLUCOSE 97 91   BUN 12 11   CREATININE 0.71 0.74   CALCIUM 8.6 8.9                   Imaging  EC-ECHOCARDIOGRAM COMPLETE W/O CONT   Final Result      US-EXTREMITY VENOUS LOWER BILAT   Final Result      CT-CTA CHEST PULMONARY ARTERY W/ RECONS   Final Result         1. Extensive bilateral pulmonary emboli with saddle embolus.      2. Elevation of the RV/LV ratio.      3. Moderate bilateral pleural effusions with bibasilar opacities, likely atelectasis.      4. Mild to moderate hiatal hernia.      CRITICAL RESULT READ BACK: Preliminary findings discussed with and critical read back performed by Dr. NOHEMY LYNNE via telephone on 5/21/2020 4:58 PM         DX-CHEST-PORTABLE (1 VIEW)   Final Result      1.  Persistent minimal LEFT lung base atelectasis or infiltrate.   2.  Increasing hazy opacity at the RIGHT lung base likely indicates atelectasis.   3.  Possible tiny bilateral pleural effusions, new from prior exam.   4.  No pneumothorax.      DX-CHEST-PORTABLE (1 VIEW)   Final Result         Ill-defined left basilar opacity, atelectasis or consolidation.           Assessment/Plan  * CAP (community acquired pneumonia)- (present on admission)  Assessment & Plan  - All cultures are NGTD  - initial COVID-19 test negative, but high risk. Unable to produce sputum for SARS-CoV2. Will repeat rapid COVID PCR which is negative x2  -SEE BELOW for CTA chest finsings  -abx's have finished their empiric course, remains stable  - repeat CXR. Check d dimer.  - continue respiratory support. Keep oxygen support, keep sats >89%.      Acute saddle pulmonary embolism with acute cor pulmonale (HCC)- (present on admission)  Assessment & Plan  -CTA confirmed, c/f RV strain  -HDS  -ECHO normal  -Pulm following  -LE venous duplex, if clot burden is present, strongly consider IVC filter in addition to AC, discussed with  pulm, no need for IVC filter at this time  -transition to eliquis 10 mg BID, work with CM for affordability-tolerating well and no bleeding issues  -continue o2 supplementation-might need it for home, consider repeat CXR soon  -unknown cause of PE, consider occult malignancy versus prolonged immobility given dementia issues    Anemia  Assessment & Plan  - likely an element of dilution, though below her baseline from 2/2020, has improved today to 9.6 now  -watch closely while on DOAC  -trend daily, conservative transfusion strategy, tx for below 7/21  -Fe studies ok, VItamin studies are ok  -check occult blood    Nausea and vomiting- (present on admission)  Assessment & Plan  - resolved. Continue supportive care with IVF, PRN antiemetics.     Bilateral pleural effusion- (present on admission)  Assessment & Plan  -ECHO normal, monitor    Glaucoma- (present on admission)  Assessment & Plan  -Continue home regimen of eyedrops    Hypothyroidism- (present on admission)  Assessment & Plan  - TSH WNL. Continue home synthroid.     Dementia (HCC)- (present on admission)  Assessment & Plan  - continue home regimen of Namenda, Depakote, Seroquel and Remeron.  - high risk for delirium. Frequent re-orientation, reestablish circadian rhythm, encourage familiar faces/family in room, avoid or minimize narcotics/sedatives.        VTE prophylaxis:eliquis

## 2020-05-26 ENCOUNTER — APPOINTMENT (OUTPATIENT)
Dept: RADIOLOGY | Facility: MEDICAL CENTER | Age: 70
DRG: 871 | End: 2020-05-26
Attending: HOSPITALIST
Payer: MEDICARE

## 2020-05-26 PROBLEM — J18.9 CAP (COMMUNITY ACQUIRED PNEUMONIA): Status: RESOLVED | Noted: 2020-05-18 | Resolved: 2020-05-26

## 2020-05-26 PROBLEM — R11.2 NAUSEA AND VOMITING: Status: RESOLVED | Noted: 2020-01-31 | Resolved: 2020-05-26

## 2020-05-26 PROCEDURE — 770020 HCHG ROOM/CARE - TELE (206)

## 2020-05-26 PROCEDURE — 700102 HCHG RX REV CODE 250 W/ 637 OVERRIDE(OP): Performed by: HOSPITALIST

## 2020-05-26 PROCEDURE — 99232 SBSQ HOSP IP/OBS MODERATE 35: CPT | Performed by: HOSPITALIST

## 2020-05-26 PROCEDURE — A9270 NON-COVERED ITEM OR SERVICE: HCPCS | Performed by: INTERNAL MEDICINE

## 2020-05-26 PROCEDURE — 700102 HCHG RX REV CODE 250 W/ 637 OVERRIDE(OP): Performed by: INTERNAL MEDICINE

## 2020-05-26 PROCEDURE — 700111 HCHG RX REV CODE 636 W/ 250 OVERRIDE (IP): Performed by: INTERNAL MEDICINE

## 2020-05-26 PROCEDURE — A9270 NON-COVERED ITEM OR SERVICE: HCPCS | Performed by: HOSPITALIST

## 2020-05-26 PROCEDURE — 700105 HCHG RX REV CODE 258: Performed by: INTERNAL MEDICINE

## 2020-05-26 RX ADMIN — DIVALPROEX SODIUM 125 MG: 125 CAPSULE ORAL at 17:49

## 2020-05-26 RX ADMIN — DIVALPROEX SODIUM 125 MG: 125 CAPSULE ORAL at 05:55

## 2020-05-26 RX ADMIN — TRAZODONE HYDROCHLORIDE 50 MG: 100 TABLET ORAL at 21:17

## 2020-05-26 RX ADMIN — MEMANTINE HYDROCHLORIDE 10 MG: 10 TABLET ORAL at 05:55

## 2020-05-26 RX ADMIN — APIXABAN 10 MG: 5 TABLET, FILM COATED ORAL at 05:55

## 2020-05-26 RX ADMIN — MIRTAZAPINE 15 MG: 15 TABLET, FILM COATED ORAL at 21:16

## 2020-05-26 RX ADMIN — APIXABAN 10 MG: 5 TABLET, FILM COATED ORAL at 17:49

## 2020-05-26 RX ADMIN — SODIUM CHLORIDE 250 MG: 9 INJECTION, SOLUTION INTRAVENOUS at 09:40

## 2020-05-26 RX ADMIN — QUETIAPINE FUMARATE 100 MG: 100 TABLET ORAL at 17:49

## 2020-05-26 RX ADMIN — LATANOPROST 1 DROP: 50 SOLUTION OPHTHALMIC at 17:56

## 2020-05-26 RX ADMIN — ATORVASTATIN CALCIUM 20 MG: 20 TABLET, FILM COATED ORAL at 21:17

## 2020-05-26 RX ADMIN — CITALOPRAM HYDROBROMIDE 20 MG: 20 TABLET ORAL at 05:55

## 2020-05-26 RX ADMIN — FAMOTIDINE 20 MG: 20 TABLET ORAL at 17:48

## 2020-05-26 RX ADMIN — FAMOTIDINE 20 MG: 20 TABLET ORAL at 05:55

## 2020-05-26 RX ADMIN — LEVOTHYROXINE SODIUM 50 MCG: 50 TABLET ORAL at 05:55

## 2020-05-26 RX ADMIN — MEMANTINE HYDROCHLORIDE 10 MG: 10 TABLET ORAL at 17:48

## 2020-05-26 ASSESSMENT — FIBROSIS 4 INDEX: FIB4 SCORE: 3.42

## 2020-05-26 NOTE — DISCHARGE PLANNING
Anticipated Discharge Disposition:   [facility]    Action:    Pt medically clear for dc per Dr. Christopher Domingo.  Pt 93-98% SPO2 on room air today.    Medications escribed to [facility] by Dr. Christopher Domingo. ERI ERAZO spoke with Shelli at pharmacy and Out-of-pocket $82.00 for apixaban.    RN CM spoke with patient's guardian Linda El and she is agreeable to patient returning to [facility] and with out-of-pocket medication cost.      ERI ERAZO spoke with Patricia the owner of [facility] and informed that medication was escribed to [facility].  She is agreeable to patient returning.  She asked about quarantine for patient for 14 days after hospital stay.    ERI ERAZO spoke with Pauline with [facility] and she stated that patient does not require quarantine.    Transport form faxed to [facility].  Transport set for 5- at 1100 as no transportation available today.  Linda Gomez, Dr. Christopher Domingo, and bedside ERI Campbell informed.    Barriers to Discharge:    None    Plan:    DC Wednesday 5- at 1100 via [facility].

## 2020-05-26 NOTE — CARE PLAN
Problem: Safety  Goal: Will remain free from falls  Outcome: PROGRESSING AS EXPECTED  Note: Bed alarmed, frequent rounding.     Problem: Respiratory:  Goal: Respiratory status will improve  Outcome: PROGRESSING AS EXPECTED     Problem: Urinary Elimination:  Goal: Ability to reestablish a normal urinary elimination pattern will improve  Outcome: PROGRESSING AS EXPECTED  Note: Purewick in place     Problem: Skin Integrity  Goal: Risk for impaired skin integrity will decrease  Outcome: PROGRESSING AS EXPECTED  Note: Q2hr turns, off-loading, incontinence care as needed.

## 2020-05-26 NOTE — DISCHARGE PLANNING
Received Transport Form @ 6782  Spoke to Damian @ [facility]    Transport is scheduled for 5/27 @2389 going to Home: [address] [facility] in [place].    Approved Services for $40 faxed by [facility].

## 2020-05-26 NOTE — DISCHARGE SUMMARY
Discharge Summary    CHIEF COMPLAINT ON ADMISSION  Chief Complaint   Patient presents with   • N/V       Reason for Admission  EMS     Admission Date  5/17/2020    CODE STATUS  DNAR/DNI    HPI & HOSPITAL COURSE  This is a 69 y.o. female here with history of dementia presented with nausea vomiting and hypoxia COVID-19 was negative she was initially treated with IV fluids and antibiotics CTA revealed bilateral PE she was started on anticoagulation with Eliquis.    Patient improved with anticoagulation she has been weaned off oxygen.  Case management has verified insurance coverage for Eliquis.  Extremity duplex was positive for left lower extremity DVT her edema is improving with anticoagulation.  Patient is clinically stable on exam today she is oriented to self denies pain and remains on room air with no increased work of breathing.  Likely stable to return to her group home and case management have arranged for transfer  She will need follow-up with her PCP and duration of anticoagulation to be reevaluated depending on her tolerance of anticoagulation Given  her saddle unprovoked PE there might be an argument for lifelong anticoagulation unless she has complications       Therefore, she is discharged in good and stable condition to home with close outpatient follow-up.    The patient met 2-midnight criteria for an inpatient stay at the time of discharge.    Discharge Date  5/27/2020    FOLLOW UP ITEMS POST DISCHARGE  Follow-up CBC and chemistry panel  Follow-up with PCP    DISCHARGE DIAGNOSES  Principal Problem (Resolved):    CAP (community acquired pneumonia) POA: Yes  Active Problems:    Acute saddle pulmonary embolism with acute cor pulmonale (HCC) POA: Yes    Anemia POA: No    Acute deep vein thrombosis (DVT) of femoral vein of left lower extremity (HCC) POA: Unknown    Dementia (HCC) POA: Yes    Hypothyroidism POA: Yes    Glaucoma POA: Yes    Bilateral pleural effusion POA: Yes  Resolved Problems:    Sepsis  ([facility]) POA: Yes    Nausea and vomiting POA: Yes      FOLLOW UP  No future appointments.  Noni Velásquez M.D.  [address] [address]  [place] [id number] [phone]    Schedule an appointment as soon as possible for a visit in 1 week  Hospital followup      MEDICATIONS ON DISCHARGE     Medication List      START taking these medications      Instructions   apixaban 5mg Tabs  Start taking on:  May 27, 2020  Commonly known as:  ELIQUIS   Take 2 Tabs by mouth 2 Times a Day for 4 days, THEN 1 Tab 2 Times a Day for 26 days. Indications: DVT/PE        CONTINUE taking these medications      Instructions   acetaminophen 500 MG Tabs  Commonly known as:  TYLENOL   Take 500 mg by mouth 3 times a day.  Dose:  500 mg     atorvastatin 20 MG Tabs  Commonly known as:  LIPITOR   Take 20 mg by mouth every evening.  Dose:  20 mg     citalopram 20 MG Tabs  Commonly known as:  CELEXA   Take 20 mg by mouth every day.  Dose:  20 mg     divalproex 125 MG Csdr  Commonly known as:  DEPAKOTE SPRINKLE   Take 125 mg by mouth 2 Times a Day. 3 capsules Twice daily  Dose:  125 mg     gemfibrozil 600 MG Tabs  Commonly known as:  LOPID   Take 300 mg by mouth 2 Times a Day.  Dose:  300 mg     KlonoPIN 2 MG tablet  Generic drug:  clonazepam   Take 1 mg by mouth every bedtime.  Dose:  1 mg     levothyroxine 50 MCG Tabs  Commonly known as:  SYNTHROID   Take 50 mcg by mouth Every morning on an empty stomach.  Dose:  50 mcg     memantine 5 MG Tabs  Commonly known as:  Namenda   Take 2 Tabs by mouth 2 times a day.  Dose:  10 mg     mirtazapine 15 MG Tabs  Commonly known as:  REMERON   Take 15 mg by mouth every evening.  Dose:  15 mg     multivitamin Tabs   Take 1 Tab by mouth every day.  Dose:  1 Tab     oyster shell calcium/vitamin D 250-125 MG-UNIT Tabs tablet   Take 1 Tab by mouth every day.  Dose:  1 Tab     polyethylene glycol/lytes Pack  Commonly known as:  MIRALAX   Take 17 g by mouth every day.  Dose:  17 g     QUEtiapine 100 MG Tabs  Commonly known as:   SEROQUEL   Take 100 mg by mouth every day.  Dose:  100 mg     raloxifene 60 MG Tabs  Commonly known as:  EVISTA   Take 60 mg by mouth every day.  Dose:  60 mg     * travoprost 0.004 % Soln  Commonly known as:  TRAVATAN Z   Place 1 Drop in both eyes every evening.  Dose:  1 Drop     * travoprost 0.004 % Soln  Commonly known as:  TRAVATAN Z   Place 1 Drop in both eyes every evening.  Dose:  1 Drop     traZODone 50 MG Tabs  Commonly known as:  DESYREL   Take 50 mg by mouth every evening.  Dose:  50 mg     Vitamin C 500 MG Caps   Take  by mouth.     Zantac 150 MG Tabs  Generic drug:  raNITidine   Take 150 mg by mouth 2 times a day.  Dose:  150 mg         * This list has 2 medication(s) that are the same as other medications prescribed for you. Read the directions carefully, and ask your doctor or other care provider to review them with you.            STOP taking these medications    aspirin 81 MG tablet            Allergies  Allergies   Allergen Reactions   • Ampicillin Rash       DIET  Orders Placed This Encounter   Procedures   • Diet Order Cardiac     Standing Status:   Standing     Number of Occurrences:   1     Order Specific Question:   Diet:     Answer:   Cardiac [6]       ACTIVITY  As tolerated.  Weight bearing as tolerated    CONSULTATIONS  None    PROCEDURES  None    LABORATORY  Lab Results   Component Value Date    SODIUM 142 05/25/2020    POTASSIUM 3.5 (L) 05/25/2020    CHLORIDE 113 (H) 05/25/2020    CO2 21 05/25/2020    GLUCOSE 91 05/25/2020    BUN 11 05/25/2020    CREATININE 0.74 05/25/2020        Lab Results   Component Value Date    WBC 9.8 05/25/2020    HEMOGLOBIN 8.8 (L) 05/25/2020    HEMATOCRIT 32.8 (L) 05/25/2020    PLATELETCT 288 05/25/2020        Total time of the discharge process exceeds 35 minutes.

## 2020-05-26 NOTE — CARE PLAN
Problem: Safety  Goal: Will remain free from falls  Outcome: PROGRESSING AS EXPECTED  Note: Fall risk prevention such as bed alarm, bed locked and in low position and 3 side rails up in place.     Problem: Psychosocial Needs:  Goal: Level of anxiety will decrease  Outcome: PROGRESSING AS EXPECTED

## 2020-05-26 NOTE — DISCHARGE INSTRUCTIONS
Discharge Instructions    Discharged to group home by medical transportation with escort. Discharged via wheelchair, hospital escort: Yes.  Special equipment needed: Wheelchair    Be sure to schedule a follow-up appointment with your primary care doctor or any specialists as instructed.     Discharge Plan:        I understand that a diet low in cholesterol, fat, and sodium is recommended for good health. Unless I have been given specific instructions below for another diet, I accept this instruction as my diet prescription.   Other diet: N/A    Special Instructions: None    · Is patient discharged on Warfarin / Coumadin?   No     Depression / Suicide Risk    As you are discharged from this [facility] facility, it is important to learn how to keep safe from harming yourself.    Recognize the warning signs:  · Abrupt changes in personality, positive or negative- including increase in energy   · Giving away possessions  · Change in eating patterns- significant weight changes-  positive or negative  · Change in sleeping patterns- unable to sleep or sleeping all the time   · Unwillingness or inability to communicate  · Depression  · Unusual sadness, discouragement and loneliness  · Talk of wanting to die  · Neglect of personal appearance   · Rebelliousness- reckless behavior  · Withdrawal from people/activities they love  · Confusion- inability to concentrate     If you or a loved one observes any of these behaviors or has concerns about self-harm, here's what you can do:  · Talk about it- your feelings and reasons for harming yourself  · Remove any means that you might use to hurt yourself (examples: pills, rope, extension cords, firearm)  · Get professional help from the community (Mental Health, Substance Abuse, psychological counseling)  · Do not be alone:Call your Safe Contact- someone whom you trust who will be there for you.  · Call your local CRISIS HOTLINE 988-0911 or 009-073-7533  · Call your local  Children's Mobile Crisis Response Team [place] (871) 514-8379 or www.TLBX.me  · Call the toll free National Suicide Prevention Hotlines   · National Suicide Prevention Lifeline 918-449-RIMF (2551)  · [employer] Line Network 800-SUICIDE (390-2703)      Pulmonary Embolism  A pulmonary embolism (PE) is a sudden blockage or decrease of blood flow in one lung or both lungs. Most blockages come from a blood clot that travels from the legs or the pelvis to the lungs. PE is a dangerous and potentially life-threatening condition if it is not treated right away.  What are the causes?  A pulmonary embolism occurs most commonly when a blood clot travels from one of your veins to your lungs. Rarely, PE is caused by air, fat, amniotic fluid, or part of a tumor traveling through your veins to your lungs.  What increases the risk?  A PE is more likely to develop in:  · People who smoke.  · People who are older, especially over 60 years of age.  · People who are overweight (obese).  · People who sit or lie still for a long time, such as during long-distance travel (over 4 hours), bed rest, hospitalization, or during recovery from certain medical conditions like a stroke.  · People who do not engage in much physical activity (sedentary lifestyle).  · People who have chronic breathing disorders.  · People who have a personal or family history of blood clots or blood clotting disease.  · People who have peripheral vascular disease (PVD), diabetes, or some types of cancer.  · People who have heart disease, especially if the person had a recent heart attack or has congestive heart failure.  · People who have neurological diseases that affect the legs (leg paresis).  · People who have had a traumatic injury, such as breaking a hip or leg.  · People who have recently had major or lengthy surgery, especially on the hip, knee, or abdomen.  · People who have had a central line placed inside a large vein.  · People who  take medicines that contain the hormone estrogen. These include birth control pills and hormone replacement therapy.  · Pregnancy or during childbirth or the postpartum period.  What are the signs or symptoms?  The symptoms of a PE usually start suddenly and include:  · Shortness of breath while active or at rest.  · Coughing or coughing up blood or blood-tinged mucus.  · Chest pain that is often worse with deep breaths.  · Rapid or irregular heartbeat.  · Feeling light-headed or dizzy.  · Fainting.  · Feeling anxious.  · Sweating.  There may also be pain and swelling in a leg if that is where the blood clot started.  These symptoms may represent a serious problem that is an emergency. Do not wait to see if the symptoms will go away. Get medical help right away. Call your local emergency services (911 in the [place] Do not drive yourself to the hospital.   How is this diagnosed?  Your health care provider will take a medical history and perform a physical exam. You may also have other tests, including:  · Blood tests to assess the clotting properties of your blood, assess oxygen levels in your blood, and find blood clots.  · Imaging tests, such as CT, ultrasound, MRI, X-ray, and other tests to see if you have clots anywhere in your body.  · An electrocardiogram (ECG) to look for heart strain from blood clots in the lungs.  How is this treated?  The main goals of PE treatment are:  · To stop a blood clot from growing larger.  · To stop new blood clots from forming.  The type of treatment that you receive depends on many factors, such as the cause of your PE, your risk for bleeding or developing more clots, and other medical conditions that you have. Sometimes, a combination of treatments is necessary.  This condition may be treated with:  · Medicines, including newer oral blood thinners (anticoagulants), warfarin, low molecular weight heparins, thrombolytics, or heparins.  · Wearing compression stockings or using  different types of devices.  · Surgery (rare) to remove the blood clot or to place a filter in your abdomen to stop the blood clot from traveling to your lungs.  Treatments for a PE are often divided into immediate treatment, long-term treatment (up to 3 months after PE), and extended treatment (more than 3 months after PE). Your treatment may continue for several months. This is called maintenance therapy, and it is used to prevent the forming of new blood clots. You can work with your health care provider to choose the treatment program that is best for you.  What are anticoagulants?   Anticoagulants are medicines that treat PEs. They can stop current blood clots from growing and stop new clots from forming. They cannot dissolve existing clots. Your body dissolves clots by itself over time. Anticoagulants are given by mouth, by injection, or through an IV tube.  What are thrombolytics?   Thrombolytics are clot-dissolving medicines that are used to dissolve a PE. They carry a high risk of bleeding, so they tend to be used only in severe cases or if you have very low blood pressure.  Follow these instructions at home:  If you are taking a newer oral anticoagulant:  · Take the medicine every single day at the same time each day.  · Understand what foods and drugs interact with this medicine.  · Understand that there are no regular blood tests required when using this medicine.  · Understand the side effects of this medicine, including excessive bruising or bleeding. Ask your health care provider or pharmacist about other possible side effects.  If you are taking warfarin:  · Understand how to take warfarin and know which foods can affect how warfarin works in your body.  · Understand that it is dangerous to take too much or too little warfarin. Too much warfarin increases the risk of bleeding. Too little warfarin continues to allow the risk for blood clots.  · Follow your PT and INR blood testing schedule. The PT and  INR results allow your health care provider to adjust your dose of warfarin. It is very important that you have your PT and INR tested as often as told by your health care provider.  · Avoid major changes in your diet, or tell your health care provider before you change your diet. Arrange a visit with a registered dietitian to answer your questions. Many foods, especially foods that are high in vitamin K, can interfere with warfarin and affect the PT and INR results. Eat a consistent amount of foods that are high in vitamin K, such as:  ¨ Spinach, kale, broccoli, cabbage, anastacia greens, turnip greens, [place] sprouts, peas, cauliflower, seaweed, and parsley.  ¨ Beef liver and pork liver.  ¨ Green tea.  ¨ Soybean oil.  · Tell your health care provider about any and all medicines, vitamins, and supplements that you take, including aspirin and other over-the-counter anti-inflammatory medicines. Be especially cautious with aspirin and anti-inflammatory medicines. Do not take those before you ask your health care provider if it is safe to do so. This is important because many medicines can interfere with warfarin and affect the PT and INR results.  · Do not start or stop taking any over-the-counter or prescription medicine unless your health care provider or pharmacist tells you to do so.  If you take warfarin, you will also need to do these things:  · Hold pressure over cuts for longer than usual.  · Tell your dentist and other health care providers that you are taking warfarin before you have any procedures in which bleeding may occur.  · Avoid alcohol or drink very small amounts. Tell your health care provider if you change your alcohol intake.  · Do not use tobacco products, including cigarettes, chewing tobacco, and e-cigarettes. If you need help quitting, ask your health care provider.  · Avoid contact sports.  General instructions  · Take over-the-counter and prescription medicines only as told by your health  care provider. Anticoagulant medicines can have side effects, including easy bruising and difficulty stopping bleeding. If you are prescribed an anticoagulant, you will also need to do these things:  ¨ Hold pressure over cuts for longer than usual.  ¨ Tell your dentist and other health care providers that you are taking anticoagulants before you have any procedures in which bleeding may occur.  ¨ Avoid contact sports.  · Wear a medical alert bracelet or carry a medical alert card that says you have had a PE.  · Ask your health care provider how soon you can go back to your normal activities. Stay active to prevent new blood clots from forming.  · Make sure to exercise while traveling or when you have been sitting or standing for a long period of time. It is very important to exercise. Exercise your legs by walking or by tightening and relaxing your leg muscles often. Take frequent walks.  · Wear compression stockings as told by your health care provider to help prevent more blood clots from forming.  · Do not use tobacco products, including cigarettes, chewing tobacco, and e-cigarettes. If you need help quitting, ask your health care provider.  · Keep all follow-up appointments with your health care provider. This is important.  How is this prevented?  Take these actions to decrease your risk of developing another PE:  · Exercise regularly. For at least 30 minutes every day, engage in:  ¨ Activity that involves moving your arms and legs.  ¨ Activity that encourages good blood flow through your body by increasing your heart rate.  · Exercise your arms and legs every hour during long-distance travel (over 4 hours). Drink plenty of water and avoid drinking alcohol while traveling.  · Avoid sitting or lying in bed for long periods of time without moving your legs.  · Maintain a weight that is appropriate for your height. Ask your health care provider what weight is healthy for you.  · If you are a woman who is over 35  years of age, avoid unnecessary use of medicines that contain estrogen. These include birth control pills.  · Do not smoke, especially if you take estrogen medicines. If you need help quitting, ask your health care provider.  · If you are at very high risk for PE, wear compression stockings.  · If you recently had a PE, have regularly scheduled ultrasound testing on your legs to check for new blood clots.  If you are hospitalized, prevention measures may include:  · Early walking after surgery, as soon as your health care provider says that it is safe.  · Receiving anticoagulants to prevent blood clots. If you cannot take anticoagulants, other options may be available, such as wearing compression stockings or using different types of devices.  Get help right away if:  · You have new or increased pain, swelling, or redness in an arm or leg.  · You have numbness or tingling in an arm or leg.  · You have shortness of breath while active or at rest.  · You have chest pain.  · You have a rapid or irregular heartbeat.  · You feel light-headed or dizzy.  · You cough up blood.  · You notice blood in your vomit, bowel movement, or urine.  · You have a fever.  These symptoms may represent a serious problem that is an emergency. Do not wait to see if the symptoms will go away. Get medical help right away. Call your local emergency services (911 in the [place] Do not drive yourself to the hospital.   This information is not intended to replace advice given to you by your health care provider. Make sure you discuss any questions you have with your health care provider.  Document Released: 12/15/2001 Document Revised: 05/25/2017 Document Reviewed: 04/13/2016  [employer] Interactive Patient Education © 2017 [employer]    Apixaban oral tablets  What is this medicine?  APIXABAN (a PIX a ban) is an anticoagulant (blood thinner). It is used to lower the chance of stroke in people with a medical condition called atrial fibrillation. It  is also used to treat or prevent blood clots in the lungs or in the veins.  This medicine may be used for other purposes; ask your health care provider or pharmacist if you have questions.  COMMON BRAND NAME(S): Eliquis  What should I tell my health care provider before I take this medicine?  They need to know if you have any of these conditions:  -bleeding disorders  -bleeding in the brain  -blood in your stools (black or tarry stools) or if you have blood in your vomit  -history of stomach bleeding  -kidney disease  -liver disease  -mechanical heart valve  -an unusual or allergic reaction to apixaban, other medicines, foods, dyes, or preservatives  -pregnant or trying to get pregnant  -breast-feeding  How should I use this medicine?  Take this medicine by mouth with a glass of water. Follow the directions on the prescription label. You can take it with or without food. If it upsets your stomach, take it with food. Take your medicine at regular intervals. Do not take it more often than directed. Do not stop taking except on your doctor's advice. Stopping this medicine may increase your risk of a blot clot. Be sure to refill your prescription before you run out of medicine.  Talk to your pediatrician regarding the use of this medicine in children. Special care may be needed.  Overdosage: If you think you have taken too much of this medicine contact a poison control center or emergency room at once.  NOTE: This medicine is only for you. Do not share this medicine with others.  What if I miss a dose?  If you miss a dose, take it as soon as you can. If it is almost time for your next dose, take only that dose. Do not take double or extra doses.  What may interact with this medicine?  This medicine may interact with the following:  -aspirin and aspirin-like medicines  -certain medicines for fungal infections like ketoconazole and itraconazole  -certain medicines for seizures like carbamazepine and phenytoin  -certain  medicines that treat or prevent blood clots like warfarin, enoxaparin, and dalteparin  -clarithromycin  -NSAIDs, medicines for pain and inflammation, like ibuprofen or naproxen  -rifampin  -ritonavir  -Timoteo's wort  This list may not describe all possible interactions. Give your health care provider a list of all the medicines, herbs, non-prescription drugs, or dietary supplements you use. Also tell them if you smoke, drink alcohol, or use illegal drugs. Some items may interact with your medicine.  What should I watch for while using this medicine?  Visit your doctor or health care professional for regular checks on your progress.  Notify your doctor or health care professional and seek emergency treatment if you develop breathing problems; changes in vision; chest pain; severe, sudden headache; pain, swelling, warmth in the leg; trouble speaking; sudden numbness or weakness of the face, arm or leg. These can be signs that your condition has gotten worse.  If you are going to have surgery or other procedure, tell your doctor that you are taking this medicine.  What side effects may I notice from receiving this medicine?  Side effects that you should report to your doctor or health care professional as soon as possible:  -allergic reactions like skin rash, itching or hives, swelling of the face, lips, or tongue  -signs and symptoms of bleeding such as bloody or black, tarry stools; red or dark-brown urine; spitting up blood or brown material that looks like coffee grounds; red spots on the skin; unusual bruising or bleeding from the eye, gums, or nose  This list may not describe all possible side effects. Call your doctor for medical advice about side effects. You may report side effects to FDA at 1-623-FDA-2937.  Where should I keep my medicine?  Keep out of the reach of children.  Store at room temperature between 20 and 25 degrees C (68 and 77 degrees F). Throw away any unused medicine after the expiration  date.  NOTE: This sheet is a summary. It may not cover all possible information. If you have questions about this medicine, talk to your doctor, pharmacist, or health care provider.  © 2018 [employer]vier/[employer] (2017-07-10 11:54:23)

## 2020-05-27 VITALS
RESPIRATION RATE: 16 BRPM | SYSTOLIC BLOOD PRESSURE: 123 MMHG | WEIGHT: 155.87 LBS | TEMPERATURE: 97.1 F | DIASTOLIC BLOOD PRESSURE: 55 MMHG | HEIGHT: 63 IN | BODY MASS INDEX: 27.62 KG/M2 | OXYGEN SATURATION: 96 % | HEART RATE: 76 BPM

## 2020-05-27 PROCEDURE — A9270 NON-COVERED ITEM OR SERVICE: HCPCS | Performed by: HOSPITALIST

## 2020-05-27 PROCEDURE — A9270 NON-COVERED ITEM OR SERVICE: HCPCS | Performed by: INTERNAL MEDICINE

## 2020-05-27 PROCEDURE — 99239 HOSP IP/OBS DSCHRG MGMT >30: CPT | Performed by: HOSPITALIST

## 2020-05-27 PROCEDURE — 700102 HCHG RX REV CODE 250 W/ 637 OVERRIDE(OP): Performed by: HOSPITALIST

## 2020-05-27 PROCEDURE — 700102 HCHG RX REV CODE 250 W/ 637 OVERRIDE(OP): Performed by: INTERNAL MEDICINE

## 2020-05-27 RX ADMIN — DIVALPROEX SODIUM 125 MG: 125 CAPSULE ORAL at 04:25

## 2020-05-27 RX ADMIN — APIXABAN 10 MG: 5 TABLET, FILM COATED ORAL at 04:25

## 2020-05-27 RX ADMIN — CITALOPRAM HYDROBROMIDE 20 MG: 20 TABLET ORAL at 04:25

## 2020-05-27 RX ADMIN — FAMOTIDINE 20 MG: 20 TABLET ORAL at 04:25

## 2020-05-27 RX ADMIN — MEMANTINE HYDROCHLORIDE 10 MG: 10 TABLET ORAL at 04:25

## 2020-05-27 RX ADMIN — RALOXIFENE 60 MG: 60 TABLET ORAL at 04:25

## 2020-05-27 RX ADMIN — LEVOTHYROXINE SODIUM 50 MCG: 50 TABLET ORAL at 04:25

## 2020-05-27 ASSESSMENT — FIBROSIS 4 INDEX: FIB4 SCORE: 3.42

## 2020-05-27 NOTE — DISCHARGE PLANNING
Anticipated Discharge Disposition:   [facility]    Action:    DNR hard rx written and signed by Dr. Christopher Domingo.  DNR rx with dc summary and Covid 19 results with 2 face sheets given to bedside RN Shannan to provide to  and group home.    Approved services for [facility] signed and faxed.    Barriers to Discharge:    None    Plan:    DC Wednesday 5- at 1100 via [facility].

## 2020-05-27 NOTE — CARE PLAN
Problem: Psychosocial Needs:  Goal: Level of anxiety will decrease  Outcome: PROGRESSING AS EXPECTED     Problem: Respiratory:  Goal: Respiratory status will improve  Outcome: PROGRESSING AS EXPECTED

## 2020-05-27 NOTE — PROGRESS NOTES
Hospital Medicine Daily Progress Note    Date of Service  5/26/2020    Chief Complaint  N/V    Hospital Course    69 y.o. female with dementia, who lives in an assisted living facility, admitted 5/17/2020 with nausea, and vomiting. She was also found to be hypoxic in the ED (3L O2 by NC). CXR showed left basilar opacity. WBC count was 17. COVID-19 was checked and was negative. Her 1st procalcitonin was low. She fulfilled sepsis criteria, and was started on IVF in addition to antibiotics. WBC count has normalized. Hgb dropped 7.7 which was felt to be due to dilution from IVF, with no evidence of gross bleeding. Sputum COVID was ordered. Her lactate has improved with IVF. Procalcitonin remained low.           Interval Problem Update    No Overnight events.  Patient weaned off oxygen.  Tolerating anticoagulation.        Consultants/Specialty  none    Code Status  DNR/DNI    Disposition  On tele now    Review of Systems  Review of Systems   Unable to perform ROS: Dementia        Pertinent positives/negatives as mentioned above.     A complete review of systems was personally done by me, limited by dementia/confusion. All other systems were negative.   Unchanged again today      Physical Exam  Temp:  [35.9 °C (96.6 °F)-36.9 °C (98.4 °F)] 36.7 °C (98.1 °F)  Pulse:  [76-99] 87  Resp:  [16-18] 18  BP: (118-164)/() 164/62  SpO2:  [92 %-98 %] 96 %    Physical Exam  Vitals signs and nursing note reviewed.   Constitutional:       General: She is not in acute distress.  HENT:      Head: Normocephalic and atraumatic.      Nose: Nose normal. No rhinorrhea.      Mouth/Throat:      Pharynx: No oropharyngeal exudate or posterior oropharyngeal erythema.   Eyes:      General: No scleral icterus.        Right eye: No discharge.         Left eye: No discharge.   Neck:      Musculoskeletal: Neck supple. No neck rigidity.   Cardiovascular:      Rate and Rhythm: Normal rate and regular rhythm.      Heart sounds: Normal heart sounds.  No murmur. No friction rub. No gallop.    Pulmonary:      Effort: Pulmonary effort is normal. No respiratory distress.      Breath sounds: Normal breath sounds. No stridor. No wheezing, rhonchi or rales.   Chest:      Chest wall: No tenderness.   Abdominal:      General: Bowel sounds are normal. There is no distension.      Palpations: Abdomen is soft. There is no mass.      Tenderness: There is no abdominal tenderness. There is no rebound.   Musculoskeletal:         General: Swelling present. No tenderness.   Skin:     General: Skin is warm and dry.      Coloration: Skin is not cyanotic or jaundiced.      Nails: There is no clubbing.     Neurological:      General: No focal deficit present.      Mental Status: She is alert.      Cranial Nerves: No cranial nerve deficit.      Motor: No weakness.   Psychiatric:         Mood and Affect: Mood normal.         Behavior: Behavior normal.               Fluids    Intake/Output Summary (Last 24 hours) at 5/26/2020 1846  Last data filed at 5/26/2020 1758  Gross per 24 hour   Intake 480 ml   Output 750 ml   Net -270 ml       Laboratory  Recent Labs     05/24/20  0258 05/25/20  0211   WBC 7.7 9.8   RBC 4.32 3.93*   HEMOGLOBIN 9.6* 8.8*   HEMATOCRIT 35.7* 32.8*   MCV 82.6 83.5   MCH 22.2* 22.4*   MCHC 26.9* 26.8*   RDW 56.8* 56.4*   PLATELETCT 311 288   MPV 9.5 10.0     Recent Labs     05/25/20  0211   SODIUM 142   POTASSIUM 3.5*   CHLORIDE 113*   CO2 21   GLUCOSE 91   BUN 11   CREATININE 0.74   CALCIUM 8.9                   Imaging  IR-US GUIDED PIV   Final Result    Ultrasound-guided PERIPHERAL IV INSERTION performed by    qualified nursing staff as above.      EC-ECHOCARDIOGRAM COMPLETE W/O CONT   Final Result      US-EXTREMITY VENOUS LOWER BILAT   Final Result      CT-CTA CHEST PULMONARY ARTERY W/ RECONS   Final Result         1. Extensive bilateral pulmonary emboli with saddle embolus.      2. Elevation of the RV/LV ratio.      3. Moderate bilateral pleural effusions with  bibasilar opacities, likely atelectasis.      4. Mild to moderate hiatal hernia.      CRITICAL RESULT READ BACK: Preliminary findings discussed with and critical read back performed by Dr. NOHEMY LYNNE via telephone on 5/21/2020 4:58 PM         DX-CHEST-PORTABLE (1 VIEW)   Final Result      1.  Persistent minimal LEFT lung base atelectasis or infiltrate.   2.  Increasing hazy opacity at the RIGHT lung base likely indicates atelectasis.   3.  Possible tiny bilateral pleural effusions, new from prior exam.   4.  No pneumothorax.      DX-CHEST-PORTABLE (1 VIEW)   Final Result         Ill-defined left basilar opacity, atelectasis or consolidation.           Assessment/Plan  Acute saddle pulmonary embolism with acute cor pulmonale (HCC)- (present on admission)  Assessment & Plan  Patient is clinically improved continue anticoagulation with Eliquis discussed with  her insurance approves coverage    Anemia  Assessment & Plan  Hemoccult negative no clinical signs of active bleeding    Acute deep vein thrombosis (DVT) of femoral vein of left lower extremity (HCC)  Assessment & Plan  Continue anticoagulation    Bilateral pleural effusion- (present on admission)  Assessment & Plan  -ECHO normal, monitor    Glaucoma- (present on admission)  Assessment & Plan  -Continue home regimen of eyedrops    Hypothyroidism- (present on admission)  Assessment & Plan  Stable on home levothyroxine    Dementia (HCC)- (present on admission)  Assessment & Plan  Stable on home meds.  Frequent orientation.  Avoid sedating agents.  Monitor for delirium.       VTE prophylaxis:eliquis

## 2020-05-27 NOTE — PROGRESS NOTES
Patient prepared for discharge. IV removed. Patient's only belongings present is a shirt which was sent with patient. Discharge paperwork sent with patient as well. The patient was transferred with 2 assist from bed to wheelchair to return to group home.

## 2020-07-13 ENCOUNTER — HOSPITAL ENCOUNTER (OUTPATIENT)
Dept: LAB | Facility: MEDICAL CENTER | Age: 70
End: 2020-07-13
Attending: INTERNAL MEDICINE
Payer: MEDICARE

## 2020-07-13 LAB
ALBUMIN SERPL BCP-MCNC: 4 G/DL (ref 3.2–4.9)
ALBUMIN/GLOB SERPL: 1.6 G/DL
ALP SERPL-CCNC: 97 U/L (ref 30–99)
ALT SERPL-CCNC: 23 U/L (ref 2–50)
ANION GAP SERPL CALC-SCNC: 13 MMOL/L (ref 7–16)
ANISOCYTOSIS BLD QL SMEAR: ABNORMAL
AST SERPL-CCNC: 23 U/L (ref 12–45)
BASOPHILS # BLD AUTO: 0.8 % (ref 0–1.8)
BASOPHILS # BLD: 0.07 K/UL (ref 0–0.12)
BILIRUB SERPL-MCNC: 0.2 MG/DL (ref 0.1–1.5)
BUN SERPL-MCNC: 11 MG/DL (ref 8–22)
CALCIUM SERPL-MCNC: 9.5 MG/DL (ref 8.5–10.5)
CHLORIDE SERPL-SCNC: 104 MMOL/L (ref 96–112)
CO2 SERPL-SCNC: 25 MMOL/L (ref 20–33)
COMMENT 1642: NORMAL
CREAT SERPL-MCNC: 0.71 MG/DL (ref 0.5–1.4)
EOSINOPHIL # BLD AUTO: 0.07 K/UL (ref 0–0.51)
EOSINOPHIL NFR BLD: 0.8 % (ref 0–6.9)
ERYTHROCYTE [DISTWIDTH] IN BLOOD BY AUTOMATED COUNT: 72.5 FL (ref 35.9–50)
GLOBULIN SER CALC-MCNC: 2.5 G/DL (ref 1.9–3.5)
GLUCOSE SERPL-MCNC: 86 MG/DL (ref 65–99)
HCT VFR BLD AUTO: 41.9 % (ref 37–47)
HGB BLD-MCNC: 13.6 G/DL (ref 12–16)
IMM GRANULOCYTES # BLD AUTO: 0.1 K/UL (ref 0–0.11)
IMM GRANULOCYTES NFR BLD AUTO: 1.1 % (ref 0–0.9)
LYMPHOCYTES # BLD AUTO: 2.87 K/UL (ref 1–4.8)
LYMPHOCYTES NFR BLD: 31.2 % (ref 22–41)
MACROCYTES BLD QL SMEAR: ABNORMAL
MCH RBC QN AUTO: 29.5 PG (ref 27–33)
MCHC RBC AUTO-ENTMCNC: 32.5 G/DL (ref 33.6–35)
MCV RBC AUTO: 90.9 FL (ref 81.4–97.8)
MICROCYTES BLD QL SMEAR: ABNORMAL
MONOCYTES # BLD AUTO: 0.83 K/UL (ref 0–0.85)
MONOCYTES NFR BLD AUTO: 9 % (ref 0–13.4)
MORPHOLOGY BLD-IMP: NORMAL
NEUTROPHILS # BLD AUTO: 5.26 K/UL (ref 2–7.15)
NEUTROPHILS NFR BLD: 57.1 % (ref 44–72)
NRBC # BLD AUTO: 0 K/UL
NRBC BLD-RTO: 0 /100 WBC
OVALOCYTES BLD QL SMEAR: NORMAL
PLATELET # BLD AUTO: 297 K/UL (ref 164–446)
PLATELET BLD QL SMEAR: NORMAL
PMV BLD AUTO: 9.9 FL (ref 9–12.9)
POIKILOCYTOSIS BLD QL SMEAR: NORMAL
POTASSIUM SERPL-SCNC: 4.2 MMOL/L (ref 3.6–5.5)
PROT SERPL-MCNC: 6.5 G/DL (ref 6–8.2)
RBC # BLD AUTO: 4.61 M/UL (ref 4.2–5.4)
RBC BLD AUTO: PRESENT
SODIUM SERPL-SCNC: 142 MMOL/L (ref 135–145)
WBC # BLD AUTO: 9.2 K/UL (ref 4.8–10.8)

## 2020-07-13 PROCEDURE — 85025 COMPLETE CBC W/AUTO DIFF WBC: CPT

## 2020-07-13 PROCEDURE — 80053 COMPREHEN METABOLIC PANEL: CPT

## 2020-12-09 ENCOUNTER — OFFICE VISIT (OUTPATIENT)
Dept: URGENT CARE | Facility: CLINIC | Age: 70
End: 2020-12-09
Payer: MEDICARE

## 2020-12-09 ENCOUNTER — HOSPITAL ENCOUNTER (OUTPATIENT)
Facility: MEDICAL CENTER | Age: 70
End: 2020-12-09
Attending: PHYSICIAN ASSISTANT
Payer: MEDICARE

## 2020-12-09 VITALS
DIASTOLIC BLOOD PRESSURE: 80 MMHG | SYSTOLIC BLOOD PRESSURE: 140 MMHG | HEART RATE: 90 BPM | RESPIRATION RATE: 18 BRPM | OXYGEN SATURATION: 95 % | TEMPERATURE: 98.4 F

## 2020-12-09 DIAGNOSIS — Z20.822 EXPOSURE TO COVID-19 VIRUS: ICD-10-CM

## 2020-12-09 PROCEDURE — U0003 INFECTIOUS AGENT DETECTION BY NUCLEIC ACID (DNA OR RNA); SEVERE ACUTE RESPIRATORY SYNDROME CORONAVIRUS 2 (SARS-COV-2) (CORONAVIRUS DISEASE [COVID-19]), AMPLIFIED PROBE TECHNIQUE, MAKING USE OF HIGH THROUGHPUT TECHNOLOGIES AS DESCRIBED BY CMS-2020-01-R: HCPCS

## 2020-12-09 PROCEDURE — 99212 OFFICE O/P EST SF 10 MIN: CPT | Mod: CS | Performed by: PHYSICIAN ASSISTANT

## 2020-12-09 ASSESSMENT — ENCOUNTER SYMPTOMS
HEADACHES: 0
COUGH: 0
FEVER: 0
SHORTNESS OF BREATH: 0
SORE THROAT: 0
VOMITING: 0
EYE REDNESS: 0
DIARRHEA: 0
EYE DISCHARGE: 0

## 2020-12-09 NOTE — PROGRESS NOTES
Subjective:      Sameera Salinas is a 70 y.o. female who presents with Headache (x1day, headache)        URI   This is a new problem. The current episode started today. The problem has been unchanged. There has been no fever. Pertinent negatives include no chest pain, congestion, coughing, diarrhea, ear pain, headaches, rash, sore throat or vomiting. She has tried nothing for the symptoms.     The patient presents to clinic with his caregiver secondary to possible COVID-19 x1 day.  The patient's caregiver from the patient's care home helps provide the history for today's encounter.  The patient's caregiver states that all of the residents of the care home (with the exception of the patient and one other resident) have recently tested positive for COVID-19.  The patient is currently not experiencing symptoms of COVID-19.  The patient's caregiver states the patient was sharing a room with one of the COVID-19 positive residents.  The patient's caregiver states the patient was previously tested for COVID-19, but states it was likely too early to perform the test.  The patient's initial COVID-19 test was negative.  The patient's caregiver reports no associated fever.  The patient reports no associated cough, congestion, sore throat, shortness of breath, chest pain, vomiting, diarrhea, or headache.  The patient has not required any medications.    PMH:  has a past medical history of Dementia (HCC) and Dementia (HCC).  MEDS:   Current Outpatient Medications:   •  acetaminophen (TYLENOL) 500 MG Tab, Take 500 mg by mouth 3 times a day., Disp: , Rfl:   •  Calcium Carb-Cholecalciferol (OYSTER SHELL CALCIUM/VITAMIN D) 250-125 MG-UNIT Tab tablet, Take 1 Tab by mouth every day., Disp: , Rfl:   •  multivitamin (THERAGRAN) Tab, Take 1 Tab by mouth every day., Disp: , Rfl:   •  atorvastatin (LIPITOR) 20 MG Tab, Take 20 mg by mouth every evening., Disp: , Rfl:   •  Divalproex Sodium (DEPAKOTE) 125 MG Capsule Delayed Release  Sprinkle, Take 125 mg by mouth 2 Times a Day. 3 capsules Twice daily, Disp: , Rfl:   •  levothyroxine (SYNTHROID) 50 MCG Tab, Take 50 mcg by mouth Every morning on an empty stomach., Disp: , Rfl:   •  mirtazapine (REMERON) 15 MG Tab, Take 15 mg by mouth every evening., Disp: , Rfl:   •  polyethylene glycol/lytes (MIRALAX) Pack, Take 17 g by mouth every day., Disp: , Rfl:   •  QUEtiapine (SEROQUEL) 100 MG Tab, Take 100 mg by mouth every day., Disp: , Rfl:   •  travoprost (TRAVATAN Z) 0.004 % Solution, Place 1 Drop in both eyes every evening., Disp: , Rfl:   •  traZODone (DESYREL) 50 MG Tab, Take 50 mg by mouth every evening., Disp: , Rfl:   •  Ascorbic Acid (VITAMIN C) 500 MG Cap, Take  by mouth., Disp: , Rfl:   •  gemfibrozil (LOPID) 600 MG TABS, Take 300 mg by mouth 2 Times a Day., Disp: , Rfl:   •  raloxifene (EVISTA) 60 MG TABS, Take 60 mg by mouth every day., Disp: , Rfl:   •  clonazepam (KLONOPIN) 2 MG tablet, Take 1 mg by mouth every bedtime., Disp: , Rfl:   •  citalopram (CELEXA) 20 MG TABS, Take 20 mg by mouth every day., Disp: , Rfl:   •  travoprost (TRAVOPROST) 0.004 % SOLN, Place 1 Drop in both eyes every evening., Disp: , Rfl:   •  ranitidine (ZANTAC) 150 MG TABS, Take 150 mg by mouth 2 times a day., Disp: , Rfl:   •  memantine (NAMENDA) 5 MG TABS, Take 2 Tabs by mouth 2 times a day., Disp: 120 Each, Rfl: 3  ALLERGIES:   Allergies   Allergen Reactions   • Ampicillin Rash     SURGHX: No past surgical history on file.  SOCHX:  reports that she has never smoked. She has never used smokeless tobacco. She reports that she does not drink alcohol or use drugs.  FH: Family history was reviewed, no pertinent findings to report      Review of Systems   Constitutional: Negative for fever.   HENT: Negative for congestion, ear pain and sore throat.    Eyes: Negative for discharge and redness.   Respiratory: Negative for cough and shortness of breath.    Cardiovascular: Negative for chest pain and leg swelling.    Gastrointestinal: Negative for diarrhea and vomiting.   Skin: Negative for rash.   Neurological: Negative for headaches.          Objective:     /80 (BP Location: Left arm, Patient Position: Sitting, BP Cuff Size: Adult)   Pulse 90   Temp 36.9 °C (98.4 °F) (Temporal)   Resp 18   SpO2 95%      Physical Exam  Constitutional:       General: She is not in acute distress.     Appearance: Normal appearance. She is not ill-appearing.   HENT:      Head: Normocephalic and atraumatic.      Right Ear: Tympanic membrane, ear canal and external ear normal.      Left Ear: Tympanic membrane, ear canal and external ear normal.   Eyes:      Extraocular Movements: Extraocular movements intact.      Conjunctiva/sclera: Conjunctivae normal.   Neck:      Musculoskeletal: Normal range of motion and neck supple.   Cardiovascular:      Rate and Rhythm: Normal rate and regular rhythm.      Heart sounds: Normal heart sounds.   Pulmonary:      Effort: Pulmonary effort is normal. No respiratory distress.      Breath sounds: Normal breath sounds. No wheezing.   Musculoskeletal: Normal range of motion.   Skin:     General: Skin is warm and dry.   Neurological:      Mental Status: She is alert. Mental status is at baseline.            Progress:  COVID-19 PCR - pending     The patient's caregiver (Katia) asked to be notified of the COVID-19 results.  [facility]: [phone]       Assessment/Plan:        1. Exposure to COVID-19 virus  - COVID/SARS COV-2 PCR; Future    The patient's presenting symptoms and physical exam findings are consistent with exposure to COVID-19.  The patient is accompanied by hercaregiver from her care home.  The patient's caregiver states a majority of the residents in the care home have tested positive for COVID-19.  The patient is currently asymptomatic.  On physical exam, the patient's lungs were clear to auscultation without wheezing, and her pulse ox was within normal limits.  The patient appears in no acute  distress.  The patient's vital signs are stable and within normal limits.  She is afebrile today in clinic.  Based on the patient's positive exposure, will test patient for COVID-19.  Advised the patient's caregiver to keep the patient at home under self quarantine while awaiting the test results.  The patient's caregiver states the patient has been quarantined since other residents tested positive for COVID-19.  Given the patient lives in a care home with multiple positive COVID-19 patients, I do not believe a rapid COVID-19 test is necessary at this time.  Advised the patient's caregiver it is very likely that the patient also has contracted COVID-19.  The patient's caregiver verbalized understanding.  Recommend OTC medications and supportive care for symptomatic management.  The patient's caregiver states the patient undergoes daily vitals, including pulse ox, heart rate, and temperature.  Advised the patient's caregiver to check the patient's vitals more frequently given her possible COVID-19 infection.  The patient's caregiver verbalized understanding.  Recommend patient follow-up with her PCP as needed. Discussed STRICT ED precautions with the patient's caregiver, and he verbalized understanding.    Differential diagnoses, supportive care, and indications for immediate follow-up discussed with patient.   Instructed to return to clinic or nearest emergency department for any change in condition, further concerns, or worsening of symptoms.    OTC Tylenol or Motrin for fever/discomfort.  OTC cough/cold medication for symptomatic relief  OTC Supportive Care for Congestion - saline nasal spray or neti pot  Drink plenty of fluids  Advised the patient to stay at home under self-isolation until symptoms have been present for at least 10 days and are improved, and there has been no fever for at least 72 hours without the use of medications and/or no vomiting or diarrhea for 48 hours.  --Provided the patient with home  isolation and self quarantine instructions  Follow-up with PCP  Return to clinic or go to the ED if symptoms worsen or fail to improve, or if the patient should develop worsening/increasing cough, congestion, ear pain, sore throat, shortness of breath, wheezing, chest pain, fever/chills, and/or any concerning symptoms.    Discussed plan with the patient's caregiver, and he agrees to the above.    Please note that this dictation was created using voice recognition software. I have made every reasonable attempt to correct obvious errors, but I expect that there may be errors of grammar and possibly content that I did not discover before finalizing the note.

## 2020-12-09 NOTE — PATIENT INSTRUCTIONS
INSTRUCTIONS FOR COVID-19 OR ANY OTHER INFECTIOUS RESPIRATORY ILLNESSES    The Centers for Disease Control and Prevention (CDC) states that early indications for COVID-19 include cough, shortness of breath, difficulty breathing, or at least two of the following symptoms: chills, shaking with chills, muscle pain, headache, sore throat, and loss of taste or smell. Symptoms can range from mild to severe and may appear up to two weeks after exposure to the virus.    The practice of self-isolation and quarantine helps protect the public and your family by  preventing exposure to people who have or may have a contagious disease. Please follow the prevention steps below as based on CDC guidelines:    WHEN TO STOP ISOLATION: Persons with COVID-19 or any other infectious respiratory illness who have symptoms and were advised to care for themselves at home may discontinue home isolation under the following conditions:  · At least 24 hours have passed since recovery defined as resolution of fever without the use of fever-reducing medications; AND,  · Improvement in respiratory symptoms (e.g., cough, shortness of breath); AND,  · At least 10 days have passed since symptoms first appeared and have had no subsequent illness.    MONITOR YOUR SYMPTOMS: If your illness is worsening, seek prompt medical attention. If you have a medical emergency and need to call 911, notify the dispatch personnel that you have, or are being evaluated for confirmed or suspected COVID-19 or another infectious respiratory illness. Wear a facemask if possible.    ACTIVITY RESTRICTION: restrict activities outside your home, except for getting medical care. Do not go to work, school, or public areas. Avoid using public transportation, ride-sharing, or taxis.    SCHEDULED MEDICAL APPOINTMENTS: Notify your provider that you have, or are being evaluated for, confirmed or suspected COVID-19 or another infectious respiratory. This will help the healthcare  provider’s office safely take care of you and keep other people from getting exposed or infected.    FACEMASKS, when to wear: Anytime you are away from your home or around other people or pets. If you are unable to wear one, maintain a minimum of 6 feet distancing from others.    LIVING ENVIRONMENT: Stay in a separate room from other people and pets. If possible, use a separate bathroom, have someone else care for your pets and avoid sharing household items. Any items used should be washed thoroughly with soap and water. Clean all “high-touch” surfaces every day. Use a household cleaning spray or wipe, according to the label instructions. High touch surfaces include (but are not limited to) counters, tabletops, doorknobs, bathroom fixtures, toilets, phones, keyboards, tablets, and bedside tables.     HAND WASHING: Frequently wash hands with soap and water for at least 20 seconds,  especially after blowing your nose, coughing, or sneezing; going to the bathroom; before and after interacting with pets; and before and after eating or preparing food. If hands are visibly dirty use soap and water. If soap and water are not available, use an alcohol-based hand  with at least 60% alcohol. Avoid touching your eyes, nose, and mouth with unwashed hands. Cover your coughs and sneezes with a tissue. Throw used tissues in a lined trash can. Immediately wash your hands.    ACTIVE/FACILITATED SELF-MONITORING: Follow instructions provided by your local health department or health professionals, as appropriate. When working with your local health department check their available hours.    [facility]   Phone Number   [facility] [phone]   [facility] [phone]   [place] [address]   [place] [phone]     IF YOU HAVE CONFIRMED POSITIVE COVID-19:    Those who have completely recovered from COVID-19 may have immune-boosting antibodies in their plasma--called “convalescent plasma”--that could be  used to treat critically ill COVID19 patients.    [facility] is excited to begin working with [facility] on collecting convalescent plasma from  people who have recovered from COVID-19 as part of a program to treat patients infected with the virus. This FDA-approved “emergency investigational new drug” is a special blood product containing antibodies that may give patients an extra boost to fight the virus.    To be eligible to donate convalescent plasma, you must have a prior COVID-19 diagnosis documented by a laboratory test (or a positive test result for SARS-CoV-2 antibodies) and meet additional eligibility requirements.    If you are interested in donating convalescent plasma or have any additional questions, please contact the [facility] Convalescent Plasma  at [phone] or via e-mail at [email or web address].

## 2020-12-10 ENCOUNTER — TELEPHONE (OUTPATIENT)
Dept: URGENT CARE | Facility: CLINIC | Age: 70
End: 2020-12-10

## 2020-12-10 LAB
COVID ORDER STATUS COVID19: NORMAL
SARS-COV-2 RNA RESP QL NAA+PROBE: NOTDETECTED
SPECIMEN SOURCE: NORMAL

## 2020-12-11 NOTE — TELEPHONE ENCOUNTER
12/11/2020 @ 1:40PM    Spoke with Katia regarding the patient's COVID-19 results. Katai states he stopped by clinic and picked up a copy of the patient's test results.

## 2020-12-11 NOTE — TELEPHONE ENCOUNTER
12/10/2020 @ 5:05PM    Attempted to call the patient's caregiver (Katia -accompanied her in clinic) with results of the patient's COVID-19 test.  The patient's COVID-19 test was negative. Katia did not answer.  Left a voice message for Katia to return my call.  If he calls back, please inform him of the negative test results.  We will also try to call the patient's caregiver again later.

## 2020-12-11 NOTE — TELEPHONE ENCOUNTER
12/11/2020 @ 11:12AM     Attempted to call the patient's caregiver (Katia -accompanied her in clinic) with results of the patient's COVID-19 test.  The patient's COVID-19 test was negative. Katia did not answer.  Left a voice message for Katia to return my call.  If he calls back, please inform him of the negative test results.  We will also try to call the patient's caregiver again later.

## 2021-01-15 DIAGNOSIS — Z23 NEED FOR VACCINATION: ICD-10-CM

## 2021-10-21 ENCOUNTER — HOSPITAL ENCOUNTER (INPATIENT)
Facility: MEDICAL CENTER | Age: 71
LOS: 3 days | DRG: 381 | End: 2021-10-24
Attending: EMERGENCY MEDICINE | Admitting: STUDENT IN AN ORGANIZED HEALTH CARE EDUCATION/TRAINING PROGRAM
Payer: MEDICARE

## 2021-10-21 ENCOUNTER — APPOINTMENT (OUTPATIENT)
Dept: RADIOLOGY | Facility: MEDICAL CENTER | Age: 71
DRG: 381 | End: 2021-10-21
Attending: INTERNAL MEDICINE
Payer: MEDICARE

## 2021-10-21 DIAGNOSIS — K92.1 GASTROINTESTINAL HEMORRHAGE WITH MELENA: ICD-10-CM

## 2021-10-21 PROBLEM — Z86.711 HISTORY OF PULMONARY EMBOLISM: Status: ACTIVE | Noted: 2021-10-21

## 2021-10-21 PROBLEM — K92.2 GI BLEED: Status: ACTIVE | Noted: 2021-10-21

## 2021-10-21 PROBLEM — E87.6 HYPOKALEMIA: Status: ACTIVE | Noted: 2021-10-21

## 2021-10-21 LAB
ABO + RH BLD: NORMAL
ABO GROUP BLD: NORMAL
ALBUMIN SERPL BCP-MCNC: 3.2 G/DL (ref 3.2–4.9)
ALBUMIN/GLOB SERPL: 1.5 G/DL
ALP SERPL-CCNC: 64 U/L (ref 30–99)
ALT SERPL-CCNC: 9 U/L (ref 2–50)
ANION GAP SERPL CALC-SCNC: 12 MMOL/L (ref 7–16)
ANISOCYTOSIS BLD QL SMEAR: ABNORMAL
APTT PPP: 24.9 SEC (ref 24.7–36)
AST SERPL-CCNC: 13 U/L (ref 12–45)
BARCODED ABORH UBTYP: 5100
BARCODED ABORH UBTYP: 5100
BARCODED PRD CODE UBPRD: NORMAL
BARCODED PRD CODE UBPRD: NORMAL
BARCODED UNIT NUM UBUNT: NORMAL
BARCODED UNIT NUM UBUNT: NORMAL
BASOPHILS # BLD AUTO: 0.5 % (ref 0–1.8)
BASOPHILS # BLD: 0.04 K/UL (ref 0–0.12)
BILIRUB SERPL-MCNC: 0.3 MG/DL (ref 0.1–1.5)
BLD GP AB SCN SERPL QL: NORMAL
BUN SERPL-MCNC: 10 MG/DL (ref 8–22)
CALCIUM SERPL-MCNC: 8.7 MG/DL (ref 8.5–10.5)
CHLORIDE SERPL-SCNC: 106 MMOL/L (ref 96–112)
CO2 SERPL-SCNC: 23 MMOL/L (ref 20–33)
COMMENT 1642: NORMAL
COMPONENT R 8504R: NORMAL
COMPONENT R 8504R: NORMAL
CREAT SERPL-MCNC: 0.69 MG/DL (ref 0.5–1.4)
EOSINOPHIL # BLD AUTO: 0.1 K/UL (ref 0–0.51)
EOSINOPHIL NFR BLD: 1.3 % (ref 0–6.9)
ERYTHROCYTE [DISTWIDTH] IN BLOOD BY AUTOMATED COUNT: 52.3 FL (ref 35.9–50)
GIANT PLATELETS BLD QL SMEAR: NORMAL
GLOBULIN SER CALC-MCNC: 2.2 G/DL (ref 1.9–3.5)
GLUCOSE SERPL-MCNC: 136 MG/DL (ref 65–99)
HCT VFR BLD AUTO: 17.3 % (ref 37–47)
HGB BLD-MCNC: 4.4 G/DL (ref 12–16)
HGB BLD-MCNC: 7.9 G/DL (ref 12–16)
HGB BLD-MCNC: 8.9 G/DL (ref 12–16)
IMM GRANULOCYTES # BLD AUTO: 0.03 K/UL (ref 0–0.11)
IMM GRANULOCYTES NFR BLD AUTO: 0.4 % (ref 0–0.9)
INR PPP: 1.2 (ref 0.87–1.13)
LYMPHOCYTES # BLD AUTO: 1.8 K/UL (ref 1–4.8)
LYMPHOCYTES NFR BLD: 22.8 % (ref 22–41)
MCH RBC QN AUTO: 19.4 PG (ref 27–33)
MCHC RBC AUTO-ENTMCNC: 25.4 G/DL (ref 33.6–35)
MCV RBC AUTO: 76.2 FL (ref 81.4–97.8)
MICROCYTES BLD QL SMEAR: ABNORMAL
MONOCYTES # BLD AUTO: 0.91 K/UL (ref 0–0.85)
MONOCYTES NFR BLD AUTO: 11.5 % (ref 0–13.4)
MORPHOLOGY BLD-IMP: NORMAL
NEUTROPHILS # BLD AUTO: 5.03 K/UL (ref 2–7.15)
NEUTROPHILS NFR BLD: 63.5 % (ref 44–72)
NRBC # BLD AUTO: 0.02 K/UL
NRBC BLD-RTO: 0.3 /100 WBC
OVALOCYTES BLD QL SMEAR: NORMAL
PLATELET # BLD AUTO: 463 K/UL (ref 164–446)
PLATELET BLD QL SMEAR: NORMAL
PMV BLD AUTO: 10.5 FL (ref 9–12.9)
POTASSIUM SERPL-SCNC: 3.4 MMOL/L (ref 3.6–5.5)
PRODUCT TYPE UPROD: NORMAL
PRODUCT TYPE UPROD: NORMAL
PROT SERPL-MCNC: 5.4 G/DL (ref 6–8.2)
PROTHROMBIN TIME: 14.9 SEC (ref 12–14.6)
RBC # BLD AUTO: 2.27 M/UL (ref 4.2–5.4)
RBC BLD AUTO: PRESENT
RH BLD: NORMAL
SODIUM SERPL-SCNC: 141 MMOL/L (ref 135–145)
UNIT STATUS USTAT: NORMAL
UNIT STATUS USTAT: NORMAL
WBC # BLD AUTO: 7.9 K/UL (ref 4.8–10.8)

## 2021-10-21 PROCEDURE — 99285 EMERGENCY DEPT VISIT HI MDM: CPT

## 2021-10-21 PROCEDURE — 85610 PROTHROMBIN TIME: CPT

## 2021-10-21 PROCEDURE — 96365 THER/PROPH/DIAG IV INF INIT: CPT

## 2021-10-21 PROCEDURE — 80053 COMPREHEN METABOLIC PANEL: CPT

## 2021-10-21 PROCEDURE — 86850 RBC ANTIBODY SCREEN: CPT

## 2021-10-21 PROCEDURE — 85018 HEMOGLOBIN: CPT | Mod: 91

## 2021-10-21 PROCEDURE — P9016 RBC LEUKOCYTES REDUCED: HCPCS | Mod: 91

## 2021-10-21 PROCEDURE — 36415 COLL VENOUS BLD VENIPUNCTURE: CPT

## 2021-10-21 PROCEDURE — 700105 HCHG RX REV CODE 258: Performed by: EMERGENCY MEDICINE

## 2021-10-21 PROCEDURE — 86901 BLOOD TYPING SEROLOGIC RH(D): CPT

## 2021-10-21 PROCEDURE — 86923 COMPATIBILITY TEST ELECTRIC: CPT

## 2021-10-21 PROCEDURE — 700105 HCHG RX REV CODE 258: Performed by: STUDENT IN AN ORGANIZED HEALTH CARE EDUCATION/TRAINING PROGRAM

## 2021-10-21 PROCEDURE — 96366 THER/PROPH/DIAG IV INF ADDON: CPT

## 2021-10-21 PROCEDURE — 74018 RADEX ABDOMEN 1 VIEW: CPT

## 2021-10-21 PROCEDURE — 302128 INFUSION PUMP: Performed by: STUDENT IN AN ORGANIZED HEALTH CARE EDUCATION/TRAINING PROGRAM

## 2021-10-21 PROCEDURE — 86900 BLOOD TYPING SEROLOGIC ABO: CPT

## 2021-10-21 PROCEDURE — 700111 HCHG RX REV CODE 636 W/ 250 OVERRIDE (IP): Mod: JG | Performed by: EMERGENCY MEDICINE

## 2021-10-21 PROCEDURE — 85730 THROMBOPLASTIN TIME PARTIAL: CPT

## 2021-10-21 PROCEDURE — 770001 HCHG ROOM/CARE - MED/SURG/GYN PRIV*

## 2021-10-21 PROCEDURE — 700111 HCHG RX REV CODE 636 W/ 250 OVERRIDE (IP): Performed by: STUDENT IN AN ORGANIZED HEALTH CARE EDUCATION/TRAINING PROGRAM

## 2021-10-21 PROCEDURE — 36430 TRANSFUSION BLD/BLD COMPNT: CPT

## 2021-10-21 PROCEDURE — 85025 COMPLETE CBC W/AUTO DIFF WBC: CPT

## 2021-10-21 PROCEDURE — 99223 1ST HOSP IP/OBS HIGH 75: CPT | Mod: AI | Performed by: STUDENT IN AN ORGANIZED HEALTH CARE EDUCATION/TRAINING PROGRAM

## 2021-10-21 RX ORDER — BISACODYL 10 MG
10 SUPPOSITORY, RECTAL RECTAL
Status: DISCONTINUED | OUTPATIENT
Start: 2021-10-21 | End: 2021-10-24 | Stop reason: HOSPADM

## 2021-10-21 RX ORDER — LATANOPROST 50 UG/ML
1 SOLUTION/ DROPS OPHTHALMIC NIGHTLY
COMMUNITY

## 2021-10-21 RX ORDER — ENALAPRILAT 1.25 MG/ML
1.25 INJECTION INTRAVENOUS EVERY 6 HOURS PRN
Status: DISCONTINUED | OUTPATIENT
Start: 2021-10-21 | End: 2021-10-24 | Stop reason: HOSPADM

## 2021-10-21 RX ORDER — POTASSIUM CHLORIDE 20 MEQ/1
20 TABLET, EXTENDED RELEASE ORAL DAILY
COMMUNITY

## 2021-10-21 RX ORDER — FUROSEMIDE 40 MG/1
40 TABLET ORAL DAILY
COMMUNITY

## 2021-10-21 RX ORDER — ACETAMINOPHEN 325 MG/1
650 TABLET ORAL EVERY 6 HOURS PRN
Status: DISCONTINUED | OUTPATIENT
Start: 2021-10-21 | End: 2021-10-24 | Stop reason: HOSPADM

## 2021-10-21 RX ORDER — QUETIAPINE FUMARATE 100 MG/1
100 TABLET, FILM COATED ORAL NIGHTLY
Status: DISCONTINUED | OUTPATIENT
Start: 2021-10-21 | End: 2021-10-24 | Stop reason: HOSPADM

## 2021-10-21 RX ORDER — LEVOTHYROXINE SODIUM 0.05 MG/1
50 TABLET ORAL
Status: DISCONTINUED | OUTPATIENT
Start: 2021-10-22 | End: 2021-10-24 | Stop reason: HOSPADM

## 2021-10-21 RX ORDER — SODIUM CHLORIDE 9 MG/ML
INJECTION, SOLUTION INTRAVENOUS CONTINUOUS
Status: ACTIVE | OUTPATIENT
Start: 2021-10-21 | End: 2021-10-22

## 2021-10-21 RX ORDER — ONDANSETRON 4 MG/1
4 TABLET, ORALLY DISINTEGRATING ORAL EVERY 4 HOURS PRN
Status: DISCONTINUED | OUTPATIENT
Start: 2021-10-21 | End: 2021-10-24 | Stop reason: HOSPADM

## 2021-10-21 RX ORDER — LABETALOL HYDROCHLORIDE 5 MG/ML
10 INJECTION, SOLUTION INTRAVENOUS EVERY 4 HOURS PRN
Status: DISCONTINUED | OUTPATIENT
Start: 2021-10-21 | End: 2021-10-24 | Stop reason: HOSPADM

## 2021-10-21 RX ORDER — SODIUM CHLORIDE 9 MG/ML
INJECTION, SOLUTION INTRAVENOUS CONTINUOUS
Status: ACTIVE | OUTPATIENT
Start: 2021-10-21 | End: 2021-10-21

## 2021-10-21 RX ORDER — ANORECTAL OINTMENT 15.7; .44; 24; 20.6 G/100G; G/100G; G/100G; G/100G
1 OINTMENT TOPICAL DAILY
COMMUNITY

## 2021-10-21 RX ORDER — POLYETHYLENE GLYCOL 3350 17 G/17G
1 POWDER, FOR SOLUTION ORAL
Status: DISCONTINUED | OUTPATIENT
Start: 2021-10-21 | End: 2021-10-24 | Stop reason: HOSPADM

## 2021-10-21 RX ORDER — POTASSIUM CHLORIDE 7.45 MG/ML
10 INJECTION INTRAVENOUS
Status: COMPLETED | OUTPATIENT
Start: 2021-10-21 | End: 2021-10-21

## 2021-10-21 RX ORDER — RALOXIFENE HYDROCHLORIDE 60 MG/1
60 TABLET, FILM COATED ORAL DAILY
Status: DISCONTINUED | OUTPATIENT
Start: 2021-10-22 | End: 2021-10-24 | Stop reason: HOSPADM

## 2021-10-21 RX ORDER — LATANOPROST 50 UG/ML
1 SOLUTION/ DROPS OPHTHALMIC NIGHTLY
Status: DISCONTINUED | OUTPATIENT
Start: 2021-10-21 | End: 2021-10-24 | Stop reason: HOSPADM

## 2021-10-21 RX ORDER — DIVALPROEX SODIUM 125 MG/1
375 CAPSULE, COATED PELLETS ORAL 2 TIMES DAILY
Status: DISCONTINUED | OUTPATIENT
Start: 2021-10-21 | End: 2021-10-24 | Stop reason: HOSPADM

## 2021-10-21 RX ORDER — PHENOL 1.4 %
10 AEROSOL, SPRAY (ML) MUCOUS MEMBRANE
COMMUNITY

## 2021-10-21 RX ORDER — MIRTAZAPINE 15 MG/1
15 TABLET, FILM COATED ORAL NIGHTLY
Status: DISCONTINUED | OUTPATIENT
Start: 2021-10-21 | End: 2021-10-24 | Stop reason: HOSPADM

## 2021-10-21 RX ORDER — FUROSEMIDE 40 MG/1
40 TABLET ORAL DAILY
Status: DISCONTINUED | OUTPATIENT
Start: 2021-10-22 | End: 2021-10-24 | Stop reason: HOSPADM

## 2021-10-21 RX ORDER — ATORVASTATIN CALCIUM 20 MG/1
20 TABLET, FILM COATED ORAL NIGHTLY
Status: DISCONTINUED | OUTPATIENT
Start: 2021-10-21 | End: 2021-10-24 | Stop reason: HOSPADM

## 2021-10-21 RX ORDER — ONDANSETRON 2 MG/ML
4 INJECTION INTRAMUSCULAR; INTRAVENOUS EVERY 4 HOURS PRN
Status: DISCONTINUED | OUTPATIENT
Start: 2021-10-21 | End: 2021-10-24 | Stop reason: HOSPADM

## 2021-10-21 RX ADMIN — SODIUM CHLORIDE: 9 INJECTION, SOLUTION INTRAVENOUS at 11:55

## 2021-10-21 RX ADMIN — POTASSIUM CHLORIDE 10 MEQ: 10 INJECTION, SOLUTION INTRAVENOUS at 13:34

## 2021-10-21 RX ADMIN — PROTHROMBIN, COAGULATION FACTOR VII HUMAN, COAGULATION FACTOR IX HUMAN, COAGULATION FACTOR X HUMAN, PROTEIN C, PROTEIN S HUMAN, AND WATER 2000 UNITS: KIT at 11:55

## 2021-10-21 RX ADMIN — POTASSIUM CHLORIDE 10 MEQ: 10 INJECTION, SOLUTION INTRAVENOUS at 12:37

## 2021-10-21 RX ADMIN — SODIUM CHLORIDE: 9 INJECTION, SOLUTION INTRAVENOUS at 13:35

## 2021-10-21 ASSESSMENT — FIBROSIS 4 INDEX: FIB4 SCORE: 1.15

## 2021-10-21 NOTE — ED NOTES
Med Rec completed per patient's MAR   Allergies reviewed  No ORAL antibiotics in last 30 days

## 2021-10-21 NOTE — ASSESSMENT & PLAN NOTE
DNR/DNI  With behavioral disturbance   From baseline history, PT appeared to have advanced dementia (bedbound with max assist in ADLs; unable to hold meaningful conversations and intermittent confusions)    Patient's guardian Claudia is leaning toward hospice set up at group home. She would like Pt's PCP Dr. Velásquez to set this up when DC.

## 2021-10-21 NOTE — H&P
Hospital Medicine History & Physical Note    Date of Service  10/21/2021    Primary Care Physician  Noni Velásquez M.D.    Consultants  GI    Specialist Names: Dr Barba    Code Status  DNAR/DNI    Chief Complaint  Chief Complaint   Patient presents with   • Bloody Stools     c/o dark tarry stool x 1 day per EMS report.       History of Presenting Illness  Sameera Salinas is a 71 y.o. female who presented 10/21/2021 with a history of dementia, DVT and PE who brought in by ambulance from [facility] home care with dark stools.  Information obtained from the chart since patient is minimally verbal on presentation due to her underlying dementia; she only responded with I have no pain.  Reportedly patient had been having dark stools greater than 1 day.  She has a history of saddle pulmonary embolism and DVT and had been on Eliquis.  Patient does not appear to have had an episode like this in the past.  In the ED hemoglobin was found to be 4.4 and transfusion was ordered.  ER provider gave Kcentra and GI was consulted. I spoke with Dr Barba of GI who said she will see the patient.    I discussed the plan of care with bedside RN.    Review of Systems  Review of Systems   Unable to perform ROS: Mental acuity       Past Medical History   has a past medical history of Dementia (HCC) and Dementia (HCC). PE, DVT    Surgical History  Unable to obtain due to patient's dementia    Family History  Unable to obtain due to patient's dementia  Family history reviewed with patient. There is no family history that is pertinent to the chief complaint.     Social History   reports that she has never smoked. She has never used smokeless tobacco. She reports that she does not drink alcohol and does not use drugs.    Allergies  Allergies   Allergen Reactions   • Ampicillin Rash       Medications  Prior to Admission Medications   Prescriptions Last Dose Informant Patient Reported? Taking?   Ascorbic Acid (VITAMIN C) 500 MG Cap  10/21/2021 at 0800 MAR from Other Facility Yes No   Sig: Take 500 mg by mouth every day.   Calcium Carb-Cholecalciferol (OYSTER CALCIUM/D3) 500-200 MG-UNIT Tab 10/21/2021 at 0800 MAR from Other Facility Yes Yes   Sig: Take 1 Tablet by mouth every day.   Divalproex Sodium (DEPAKOTE) 125 MG Capsule Delayed Release Sprinkle 10/20/2021 at 2000 MAR from Other Facility Yes No   Sig: Take 375 mg by mouth 2 times a day.   Melatonin 10 MG Tab 10/20/2021 at 2000 MAR from Other Facility Yes Yes   Sig: Take 10 mg by mouth at bedtime.   QUEtiapine (SEROQUEL) 100 MG Tab 10/20/2021 at 2000 MAR from Other Facility Yes No   Sig: Take 100 mg by mouth every day.   acetaminophen (TYLENOL) 500 MG Tab 10/21/2021 at 0800 MAR from Other Facility Yes No   Sig: Take 500 mg by mouth 3 times a day.   apixaban (ELIQUIS) 5mg Tab 10/21/2021 at 0800 MAR from Other Facility Yes Yes   Sig: Take 5 mg by mouth 2 times a day.   atorvastatin (LIPITOR) 20 MG Tab 10/20/2021 at 2000 MAR from Other Facility Yes No   Sig: Take 20 mg by mouth every evening.   furosemide (LASIX) 40 MG Tab 10/21/2021 at 0800 MAR from Other Facility Yes Yes   Sig: Take 40 mg by mouth every day.   latanoprost (XALATAN) 0.005 % Solution 10/20/2021 at 2100 MAR from Other Facility Yes Yes   Sig: Administer 1 Drop into both eyes every evening.   levothyroxine (SYNTHROID) 50 MCG Tab 10/21/2021 at 0700 MAR from Other Facility Yes No   Sig: Take 50 mcg by mouth Every morning on an empty stomach.   menthol-zinc oxide (CALMOSEPTINE) 0.44-20.6 % Ointment ointment 10/21/2021 at 0800 MAR from Other Facility Yes Yes   Sig: Apply 1 Application topically every day.   mirtazapine (REMERON) 15 MG Tab 10/20/2021 at 2000 MAR from Other Facility Yes No   Sig: Take 15 mg by mouth every evening.   multivitamin (THERAGRAN) Tab 10/21/2021 at 0800 MAR from Other Facility Yes No   Sig: Take 1 Tab by mouth every day.   polyethylene glycol/lytes (MIRALAX) Pack 10/21/2021 at 0800 MAR from Other Facility Yes  No   Sig: Take 17 g by mouth every day.   potassium chloride SA (KDUR) 20 MEQ Tab CR 10/21/2021 at 0800 MAR from Other Facility Yes Yes   Sig: Take 20 mEq by mouth every day.   raloxifene (EVISTA) 60 MG TABS 10/21/2021 at 0800 MAR from Other Facility Yes No   Sig: Take 60 mg by mouth every day.   traZODone (DESYREL) 50 MG Tab 10/20/2021 at 2000 MAR from Other Facility Yes No   Sig: Take 50 mg by mouth every evening.      Facility-Administered Medications: None       Physical Exam  Temp:  [36.2 °C (97.1 °F)-36.6 °C (97.9 °F)] 36.2 °C (97.1 °F)  Pulse:  [87-96] 87  Resp:  [14-18] 14  BP: ()/(49-62) 136/62  SpO2:  [90 %-96 %] 96 %  Blood Pressure : (!) 93/51   Temperature: 36.6 °C (97.9 °F)   Pulse: 89   Respiration: 17   Pulse Oximetry: 91 %       Physical Exam  Vitals and nursing note reviewed.   Constitutional:       General: She is not in acute distress.     Appearance: She is not toxic-appearing.   HENT:      Head: Normocephalic and atraumatic.   Eyes:      General: No scleral icterus.        Right eye: No discharge.         Left eye: No discharge.   Cardiovascular:      Rate and Rhythm: Normal rate and regular rhythm.      Heart sounds: Normal heart sounds. No murmur heard.     Pulmonary:      Effort: Pulmonary effort is normal. No respiratory distress.      Breath sounds: Normal breath sounds. No wheezing.   Abdominal:      General: Abdomen is flat. There is no distension.      Palpations: Abdomen is soft.      Tenderness: There is no abdominal tenderness. There is no guarding.   Musculoskeletal:      Right lower leg: No edema.      Left lower leg: No edema.   Skin:     General: Skin is warm and dry.      Coloration: Skin is not jaundiced.   Neurological:      Mental Status: She is alert. She is disoriented.      Motor: Motor function is intact.   Psychiatric:      Comments: Unable to assess due to underlying dementia.         Laboratory:  Recent Labs     10/21/21  1000   WBC 7.9   RBC 2.27*   HEMOGLOBIN  4.4*   HEMATOCRIT 17.3*   MCV 76.2*   MCH 19.4*   MCHC 25.4*   RDW 52.3*   PLATELETCT 463*   MPV 10.5     Recent Labs     10/21/21  1000   SODIUM 141   POTASSIUM 3.4*   CHLORIDE 106   CO2 23   GLUCOSE 136*   BUN 10   CREATININE 0.69   CALCIUM 8.7     Recent Labs     10/21/21  1000   ALTSGPT 9   ASTSGOT 13   ALKPHOSPHAT 64   TBILIRUBIN 0.3   GLUCOSE 136*     Recent Labs     10/21/21  1000   APTT 24.9   INR 1.20*     No results for input(s): NTPROBNP in the last 72 hours.      No results for input(s): TROPONINT in the last 72 hours.    Imaging:  No orders to display       no X-Ray or EKG requiring interpretation    Assessment/Plan:  I anticipate this patient will require at least two midnights for appropriate medical management, necessitating inpatient admission.    * GI bleed  Assessment & Plan  Hgb 4.4. Presenting with dark stools  H/o saddle embolism. On eliquis now with a GI bleed. Will dc the eliquis, k centra given by ED.   Transfuse hgb. Goal hgb>7  H&H q8H  GI consulted.  NPO    Hypokalemia  Assessment & Plan  K 3.4  Potassium chloride 10meq IV x2  bmp    History of pulmonary embolism  Assessment & Plan  H/o saddle embolism. On eliquis now with a GI bleed. Will dc the eliquis. GI consulted.    Hypothyroidism- (present on admission)  Assessment & Plan  Cont home med levothyroxine 50 mcg    Dementia (HCC)- (present on admission)  Assessment & Plan  Cont home meds. DNR      VTE prophylaxis: SCDs/TEDs and pharmacologic prophylaxis contraindicated due to GI bleed

## 2021-10-21 NOTE — ASSESSMENT & PLAN NOTE
Hgb 4.4. Presenting with dark stools  H/o saddle embolism. On eliquis presented with a GI bleed. Will dc the eliquis, k centra given by ED.   Transfuse hgb. Goal hgb>7  H&H q8H  GI consulted. EGD finding with no active source of bleeding explained.      Prilosec bid  No indications for colonoscopy at this point    10/23: H&H relatedly stable and no further signs of bleeding at this point; GI sign off

## 2021-10-21 NOTE — ED PROVIDER NOTES
ED Provider Note    CHIEF COMPLAINT  Chief Complaint   Patient presents with   • Bloody Stools     c/o dark tarry stool x 1 day per EMS report.       HPI  Sameera Salinas is a 71 y.o. female who presents with dark stools.  The patient has dementia and the history obtained was from EMS and the patient's assisted care facility.  The patient is currently on Eliquis for a DVT and pulmonary embolus.  She is had dark tarry stools over the last 24 hours.  Patient denies pain.  She states she has not nauseated.  She also denies fevers.  But she is a poor historian due to her dementia.    REVIEW OF SYSTEMS  See HPI for further details. All other systems are negative however this is limited due to the patient's dementia.     PAST MEDICAL HISTORY  Past Medical History:   Diagnosis Date   • Dementia (HCC)    • Dementia (HCC)        FAMILY HISTORY  [unfilled]    SOCIAL HISTORY  Social History     Socioeconomic History   • Marital status:      Spouse name: Not on file   • Number of children: Not on file   • Years of education: Not on file   • Highest education level: Not on file   Occupational History   • Not on file   Tobacco Use   • Smoking status: Never Smoker   • Smokeless tobacco: Never Used   Vaping Use   • Vaping Use: Never used   Substance and Sexual Activity   • Alcohol use: No   • Drug use: No   • Sexual activity: Not on file   Other Topics Concern   • Not on file   Social History Narrative   • Not on file     Social Determinants of Health     Financial Resource Strain:    • Difficulty of Paying Living Expenses:    Food Insecurity:    • Worried About Running Out of Food in the Last Year:    • Ran Out of Food in the Last Year:    Transportation Needs:    • Lack of Transportation (Medical):    • Lack of Transportation (Non-Medical):    Physical Activity:    • Days of Exercise per Week:    • Minutes of Exercise per Session:    Stress:    • Feeling of Stress :    Social Connections:    • Frequency of Communication  with Friends and Family:    • Frequency of Social Gatherings with Friends and Family:    • Attends [employer] Services:    • Active Member of Clubs or Organizations:    • Attends Club or Organization Meetings:    • Marital Status:    Intimate Partner Violence:    • Fear of Current or Ex-Partner:    • Emotionally Abused:    • Physically Abused:    • Sexually Abused:        SURGICAL HISTORY  No past surgical history on file.    CURRENT MEDICATIONS  Home Medications     Reviewed by Rio Parsons R.N. (Registered Nurse) on 10/21/21 at 0943  Med List Status: Not Addressed   Medication Last Dose Status   acetaminophen (TYLENOL) 500 MG Tab  Active   Ascorbic Acid (VITAMIN C) 500 MG Cap  Active   atorvastatin (LIPITOR) 20 MG Tab  Active   Calcium Carb-Cholecalciferol (OYSTER SHELL CALCIUM/VITAMIN D) 250-125 MG-UNIT Tab tablet  Active   citalopram (CELEXA) 20 MG TABS  Active   clonazepam (KLONOPIN) 2 MG tablet  Active   Divalproex Sodium (DEPAKOTE) 125 MG Capsule Delayed Release Sprinkle  Active   gemfibrozil (LOPID) 600 MG TABS  Active   levothyroxine (SYNTHROID) 50 MCG Tab  Active   memantine (NAMENDA) 5 MG TABS  Active   mirtazapine (REMERON) 15 MG Tab  Active   multivitamin (THERAGRAN) Tab  Active   polyethylene glycol/lytes (MIRALAX) Pack  Active   QUEtiapine (SEROQUEL) 100 MG Tab  Active   raloxifene (EVISTA) 60 MG TABS  Active   ranitidine (ZANTAC) 150 MG TABS  Active   travoprost (TRAVATAN Z) 0.004 % Solution  Active   travoprost (TRAVOPROST) 0.004 % SOLN  Active   traZODone (DESYREL) 50 MG Tab  Active                ALLERGIES  Allergies   Allergen Reactions   • Ampicillin Rash       PHYSICAL EXAM  VITAL SIGNS: /55   Pulse 96   Temp 36.6 °C (97.9 °F) (Temporal)   Resp 18   Wt 74.8 kg (165 lb)   SpO2 93%   BMI 29.23 kg/m²       Constitutional: Chronically ill in appearance  HENT: Normocephalic, Atraumatic, Bilateral external ears normal, Oropharynx moist, No oral exudates, Nose normal.   Eyes: PERRLA,  EOMI, Conjunctiva normal, No discharge.   Neck: Normal range of motion, No tenderness, Supple, No stridor.   Lymphatic: No lymphadenopathy noted.   Cardiovascular: Normal heart rate, Normal rhythm, No murmurs, No rubs, No gallops.   Thorax & Lungs: Normal breath sounds, No respiratory distress, No wheezing, No chest tenderness.   Abdomen: Bowel sounds normal, Soft, No tenderness, No masses, No pulsatile masses.   Skin: Diffuse pallor.   Back: No tenderness, No CVA tenderness.    Rectal: Dark stool that is guaiac positive  Extremities: Intact distal pulses, No edema, No tenderness, No cyanosis, No clubbing.    Neurologic: Alert & oriented x 1, Normal motor function, Normal sensory function, No focal deficits noted.   Psychiatric: Affect normal, Judgment normal, Mood normal.     Results for orders placed or performed during the hospital encounter of 10/21/21   CBC WITH DIFFERENTIAL   Result Value Ref Range    WBC 7.9 4.8 - 10.8 K/uL    RBC 2.27 (L) 4.20 - 5.40 M/uL    Hemoglobin 4.4 (LL) 12.0 - 16.0 g/dL    Hematocrit 17.3 (LL) 37.0 - 47.0 %    MCV 76.2 (L) 81.4 - 97.8 fL    MCH 19.4 (L) 27.0 - 33.0 pg    MCHC 25.4 (L) 33.6 - 35.0 g/dL    RDW 52.3 (H) 35.9 - 50.0 fL    Platelet Count 463 (H) 164 - 446 K/uL    MPV 10.5 9.0 - 12.9 fL    Neutrophils-Polys 63.50 44.00 - 72.00 %    Lymphocytes 22.80 22.00 - 41.00 %    Monocytes 11.50 0.00 - 13.40 %    Eosinophils 1.30 0.00 - 6.90 %    Basophils 0.50 0.00 - 1.80 %    Immature Granulocytes 0.40 0.00 - 0.90 %    Nucleated RBC 0.30 /100 WBC    Neutrophils (Absolute) 5.03 2.00 - 7.15 K/uL    Lymphs (Absolute) 1.80 1.00 - 4.80 K/uL    Monos (Absolute) 0.91 (H) 0.00 - 0.85 K/uL    Eos (Absolute) 0.10 0.00 - 0.51 K/uL    Baso (Absolute) 0.04 0.00 - 0.12 K/uL    Immature Granulocytes (abs) 0.03 0.00 - 0.11 K/uL    NRBC (Absolute) 0.02 K/uL    Anisocytosis 1+     Microcytosis 1+    COMP METABOLIC PANEL   Result Value Ref Range    Sodium 141 135 - 145 mmol/L    Potassium 3.4 (L) 3.6  - 5.5 mmol/L    Chloride 106 96 - 112 mmol/L    Co2 23 20 - 33 mmol/L    Anion Gap 12.0 7.0 - 16.0    Glucose 136 (H) 65 - 99 mg/dL    Bun 10 8 - 22 mg/dL    Creatinine 0.69 0.50 - 1.40 mg/dL    Calcium 8.7 8.5 - 10.5 mg/dL    AST(SGOT) 13 12 - 45 U/L    ALT(SGPT) 9 2 - 50 U/L    Alkaline Phosphatase 64 30 - 99 U/L    Total Bilirubin 0.3 0.1 - 1.5 mg/dL    Albumin 3.2 3.2 - 4.9 g/dL    Total Protein 5.4 (L) 6.0 - 8.2 g/dL    Globulin 2.2 1.9 - 3.5 g/dL    A-G Ratio 1.5 g/dL   APTT   Result Value Ref Range    APTT 24.9 24.7 - 36.0 sec   PROTHROMBIN TIME   Result Value Ref Range    PT 14.9 (H) 12.0 - 14.6 sec    INR 1.20 (H) 0.87 - 1.13   ESTIMATED GFR   Result Value Ref Range    GFR If African American >60 >60 mL/min/1.73 m 2    GFR If Non African American >60 >60 mL/min/1.73 m 2   PERIPHERAL SMEAR REVIEW   Result Value Ref Range    Peripheral Smear Review see below    PLATELET ESTIMATE   Result Value Ref Range    Plt Estimation Increased    MORPHOLOGY   Result Value Ref Range    RBC Morphology Present     Giant Platelets 2+     Ovalocytes 2+    DIFFERENTIAL COMMENT   Result Value Ref Range    Comments-Diff see below        COURSE & MEDICAL DECISION MAKING  Pertinent Labs & Imaging studies reviewed. (See chart for details)  This a 71-year-old female who presents the emerge department acutely ill in appearance.  She did have diffuse pallor and laboratory analysis does show concerning anemia with a hemoglobin of 4.4.  I suspect this is from a GI source based on my exam.  The patient will receive Kcentra for reversal of the Eliquis.  I currently have GI on call for consultation.  The patient be admitted to the hospitalist in guarded condition.  On repeat exam her exam is unchanged.  Her blood pressure has remained stable.  I have ordered a crossmatch as well as blood products as well.  The patient cannot sign consent secondary to her dementia and she will require transfusion due to the critically low  hemoglobin.    FINAL IMPRESSION  1.  GI bleed  2.  Anemia requiring transfusion  3.  Iatrogenic coagulopathy  4.  Critical care time 30 minutes    Disposition  The patient will be admitted in guarded condition         Electronically signed by: Phil Ocampo M.D., 10/21/2021 9:50 AM

## 2021-10-21 NOTE — ASSESSMENT & PLAN NOTE
H/o saddle embolism. On eliquis now with a GI bleed.  DC Eliquis on admission.   GI consulted.    EGD finding with no active source of bleeding explained.  With comfort focused care and hospice set up pending, my recommendation is to stop anticoagulation at this point which Claudia was agreeable.

## 2021-10-21 NOTE — DISCHARGE PLANNING
Patient is a 71-year old women with dementia, no visual or hearing impairments. Lives in [facility]: [phone]. GH owner and caregiver is Patricia (on emergency contact list).     Patient has guardian on file with [facility]: Guardian is Maria De Jesus Gregory, however Karena El is guardian . [phone] (cell).     SW called Karena, informed her of patient's status in ER. She is aware. She reports approval to provide any treatment/procedure that patient needs to become well again, and goal is to return to [facility].     Long term plan for patient is Hospice with [facility]. Patient has not been assessed or accepted. Karena reports once patient is home, Dr. Velásquez will coordinate assessment with hospice.     No other concerns per guardian .

## 2021-10-21 NOTE — ED TRIAGE NOTES
.Sameera Salinas  .  Chief Complaint   Patient presents with   • Bloody Stools     c/o dark tarry stool x 1 day per EMS report.     Patient BIB [facility] from [facility] with above complaint. Patient appears pale. aao to self only, baseline for patient, hx of dementia.   Vss.     ERP to see.

## 2021-10-22 ENCOUNTER — ANESTHESIA (OUTPATIENT)
Dept: SURGERY | Facility: MEDICAL CENTER | Age: 71
DRG: 381 | End: 2021-10-22
Payer: MEDICARE

## 2021-10-22 ENCOUNTER — ANESTHESIA EVENT (OUTPATIENT)
Dept: SURGERY | Facility: MEDICAL CENTER | Age: 71
DRG: 381 | End: 2021-10-22
Payer: MEDICARE

## 2021-10-22 PROBLEM — E03.8 OTHER SPECIFIED HYPOTHYROIDISM: Status: ACTIVE | Noted: 2020-01-31

## 2021-10-22 LAB
ANION GAP SERPL CALC-SCNC: 10 MMOL/L (ref 7–16)
BASOPHILS # BLD AUTO: 0.8 % (ref 0–1.8)
BASOPHILS # BLD: 0.08 K/UL (ref 0–0.12)
BUN SERPL-MCNC: 9 MG/DL (ref 8–22)
CALCIUM SERPL-MCNC: 8.5 MG/DL (ref 8.5–10.5)
CHLORIDE SERPL-SCNC: 108 MMOL/L (ref 96–112)
CO2 SERPL-SCNC: 20 MMOL/L (ref 20–33)
CREAT SERPL-MCNC: 0.58 MG/DL (ref 0.5–1.4)
EKG IMPRESSION: NORMAL
EOSINOPHIL # BLD AUTO: 0.1 K/UL (ref 0–0.51)
EOSINOPHIL NFR BLD: 1 % (ref 0–6.9)
ERYTHROCYTE [DISTWIDTH] IN BLOOD BY AUTOMATED COUNT: 51.8 FL (ref 35.9–50)
EXTERNAL QUALITY CONTROL: NORMAL
GLUCOSE SERPL-MCNC: 112 MG/DL (ref 65–99)
HCT VFR BLD AUTO: 27.9 % (ref 37–47)
HGB BLD-MCNC: 8.4 G/DL (ref 12–16)
HGB BLD-MCNC: 8.4 G/DL (ref 12–16)
HGB BLD-MCNC: 8.9 G/DL (ref 12–16)
IMM GRANULOCYTES # BLD AUTO: 0.12 K/UL (ref 0–0.11)
IMM GRANULOCYTES NFR BLD AUTO: 1.2 % (ref 0–0.9)
LYMPHOCYTES # BLD AUTO: 2.23 K/UL (ref 1–4.8)
LYMPHOCYTES NFR BLD: 21.4 % (ref 22–41)
MCH RBC QN AUTO: 23.9 PG (ref 27–33)
MCHC RBC AUTO-ENTMCNC: 30.1 G/DL (ref 33.6–35)
MCV RBC AUTO: 79.3 FL (ref 81.4–97.8)
MONOCYTES # BLD AUTO: 0.93 K/UL (ref 0–0.85)
MONOCYTES NFR BLD AUTO: 8.9 % (ref 0–13.4)
NEUTROPHILS # BLD AUTO: 6.94 K/UL (ref 2–7.15)
NEUTROPHILS NFR BLD: 66.7 % (ref 44–72)
NRBC # BLD AUTO: 0.02 K/UL
NRBC BLD-RTO: 0.2 /100 WBC
PATHOLOGY CONSULT NOTE: NORMAL
PLATELET # BLD AUTO: 405 K/UL (ref 164–446)
PMV BLD AUTO: 10.2 FL (ref 9–12.9)
POTASSIUM SERPL-SCNC: 3.8 MMOL/L (ref 3.6–5.5)
RBC # BLD AUTO: 3.52 M/UL (ref 4.2–5.4)
SARS-COV+SARS-COV-2 AG RESP QL IA.RAPID: NEGATIVE
SODIUM SERPL-SCNC: 138 MMOL/L (ref 135–145)
WBC # BLD AUTO: 10.4 K/UL (ref 4.8–10.8)

## 2021-10-22 PROCEDURE — 160009 HCHG ANES TIME/MIN: Performed by: INTERNAL MEDICINE

## 2021-10-22 PROCEDURE — 85018 HEMOGLOBIN: CPT

## 2021-10-22 PROCEDURE — 85025 COMPLETE CBC W/AUTO DIFF WBC: CPT

## 2021-10-22 PROCEDURE — 0DB78ZX EXCISION OF STOMACH, PYLORUS, VIA NATURAL OR ARTIFICIAL OPENING ENDOSCOPIC, DIAGNOSTIC: ICD-10-PCS | Performed by: INTERNAL MEDICINE

## 2021-10-22 PROCEDURE — 160002 HCHG RECOVERY MINUTES (STAT): Performed by: INTERNAL MEDICINE

## 2021-10-22 PROCEDURE — 160036 HCHG PACU - EA ADDL 30 MINS PHASE I: Performed by: INTERNAL MEDICINE

## 2021-10-22 PROCEDURE — A9270 NON-COVERED ITEM OR SERVICE: HCPCS | Performed by: STUDENT IN AN ORGANIZED HEALTH CARE EDUCATION/TRAINING PROGRAM

## 2021-10-22 PROCEDURE — 770001 HCHG ROOM/CARE - MED/SURG/GYN PRIV*

## 2021-10-22 PROCEDURE — 36415 COLL VENOUS BLD VENIPUNCTURE: CPT

## 2021-10-22 PROCEDURE — 99232 SBSQ HOSP IP/OBS MODERATE 35: CPT | Performed by: STUDENT IN AN ORGANIZED HEALTH CARE EDUCATION/TRAINING PROGRAM

## 2021-10-22 PROCEDURE — 93010 ELECTROCARDIOGRAM REPORT: CPT | Performed by: INTERNAL MEDICINE

## 2021-10-22 PROCEDURE — 160048 HCHG OR STATISTICAL LEVEL 1-5: Performed by: INTERNAL MEDICINE

## 2021-10-22 PROCEDURE — 93005 ELECTROCARDIOGRAM TRACING: CPT | Performed by: INTERNAL MEDICINE

## 2021-10-22 PROCEDURE — 87426 SARSCOV CORONAVIRUS AG IA: CPT | Performed by: INTERNAL MEDICINE

## 2021-10-22 PROCEDURE — 88305 TISSUE EXAM BY PATHOLOGIST: CPT

## 2021-10-22 PROCEDURE — 700111 HCHG RX REV CODE 636 W/ 250 OVERRIDE (IP): Performed by: ANESTHESIOLOGY

## 2021-10-22 PROCEDURE — A9270 NON-COVERED ITEM OR SERVICE: HCPCS | Performed by: INTERNAL MEDICINE

## 2021-10-22 PROCEDURE — 0DB98ZX EXCISION OF DUODENUM, VIA NATURAL OR ARTIFICIAL OPENING ENDOSCOPIC, DIAGNOSTIC: ICD-10-PCS | Performed by: INTERNAL MEDICINE

## 2021-10-22 PROCEDURE — 88312 SPECIAL STAINS GROUP 1: CPT

## 2021-10-22 PROCEDURE — 160035 HCHG PACU - 1ST 60 MINS PHASE I: Performed by: INTERNAL MEDICINE

## 2021-10-22 PROCEDURE — 80048 BASIC METABOLIC PNL TOTAL CA: CPT

## 2021-10-22 PROCEDURE — 700101 HCHG RX REV CODE 250: Performed by: ANESTHESIOLOGY

## 2021-10-22 PROCEDURE — 700105 HCHG RX REV CODE 258: Performed by: ANESTHESIOLOGY

## 2021-10-22 PROCEDURE — 700102 HCHG RX REV CODE 250 W/ 637 OVERRIDE(OP): Performed by: STUDENT IN AN ORGANIZED HEALTH CARE EDUCATION/TRAINING PROGRAM

## 2021-10-22 PROCEDURE — 160203 HCHG ENDO MINUTES - 1ST 30 MINS LEVEL 4: Performed by: INTERNAL MEDICINE

## 2021-10-22 PROCEDURE — 700102 HCHG RX REV CODE 250 W/ 637 OVERRIDE(OP): Performed by: INTERNAL MEDICINE

## 2021-10-22 RX ORDER — LIDOCAINE HYDROCHLORIDE 20 MG/ML
INJECTION, SOLUTION EPIDURAL; INFILTRATION; INTRACAUDAL; PERINEURAL PRN
Status: DISCONTINUED | OUTPATIENT
Start: 2021-10-22 | End: 2021-10-22 | Stop reason: SURG

## 2021-10-22 RX ORDER — LABETALOL HYDROCHLORIDE 5 MG/ML
5 INJECTION, SOLUTION INTRAVENOUS
Status: DISCONTINUED | OUTPATIENT
Start: 2021-10-22 | End: 2021-10-22 | Stop reason: HOSPADM

## 2021-10-22 RX ORDER — HYDRALAZINE HYDROCHLORIDE 20 MG/ML
5 INJECTION INTRAMUSCULAR; INTRAVENOUS
Status: DISCONTINUED | OUTPATIENT
Start: 2021-10-22 | End: 2021-10-22 | Stop reason: HOSPADM

## 2021-10-22 RX ORDER — SODIUM CHLORIDE, SODIUM LACTATE, POTASSIUM CHLORIDE, CALCIUM CHLORIDE 600; 310; 30; 20 MG/100ML; MG/100ML; MG/100ML; MG/100ML
INJECTION, SOLUTION INTRAVENOUS
Status: DISCONTINUED | OUTPATIENT
Start: 2021-10-22 | End: 2021-10-22 | Stop reason: SURG

## 2021-10-22 RX ORDER — OMEPRAZOLE 20 MG/1
20 CAPSULE, DELAYED RELEASE ORAL 2 TIMES DAILY
Status: DISCONTINUED | OUTPATIENT
Start: 2021-10-22 | End: 2021-10-24 | Stop reason: HOSPADM

## 2021-10-22 RX ORDER — SODIUM CHLORIDE, SODIUM LACTATE, POTASSIUM CHLORIDE, CALCIUM CHLORIDE 600; 310; 30; 20 MG/100ML; MG/100ML; MG/100ML; MG/100ML
INJECTION, SOLUTION INTRAVENOUS CONTINUOUS
Status: DISCONTINUED | OUTPATIENT
Start: 2021-10-22 | End: 2021-10-22 | Stop reason: HOSPADM

## 2021-10-22 RX ORDER — SODIUM CHLORIDE 9 MG/ML
500 INJECTION, SOLUTION INTRAVENOUS
Status: DISCONTINUED | OUTPATIENT
Start: 2021-10-22 | End: 2021-10-22 | Stop reason: HOSPADM

## 2021-10-22 RX ORDER — ONDANSETRON 2 MG/ML
4 INJECTION INTRAMUSCULAR; INTRAVENOUS
Status: DISCONTINUED | OUTPATIENT
Start: 2021-10-22 | End: 2021-10-22 | Stop reason: HOSPADM

## 2021-10-22 RX ORDER — METOPROLOL TARTRATE 1 MG/ML
1 INJECTION, SOLUTION INTRAVENOUS
Status: DISCONTINUED | OUTPATIENT
Start: 2021-10-22 | End: 2021-10-22 | Stop reason: HOSPADM

## 2021-10-22 RX ADMIN — OMEPRAZOLE 20 MG: 20 CAPSULE, DELAYED RELEASE ORAL at 17:05

## 2021-10-22 RX ADMIN — LATANOPROST 1 DROP: 50 SOLUTION OPHTHALMIC at 00:42

## 2021-10-22 RX ADMIN — ATORVASTATIN CALCIUM 20 MG: 20 TABLET, FILM COATED ORAL at 21:22

## 2021-10-22 RX ADMIN — MIRTAZAPINE 15 MG: 15 TABLET, FILM COATED ORAL at 21:22

## 2021-10-22 RX ADMIN — QUETIAPINE FUMARATE 100 MG: 100 TABLET ORAL at 21:22

## 2021-10-22 RX ADMIN — LIDOCAINE HYDROCHLORIDE 40 MG: 20 INJECTION, SOLUTION EPIDURAL; INFILTRATION; INTRACAUDAL at 13:44

## 2021-10-22 RX ADMIN — LATANOPROST 1 DROP: 50 SOLUTION OPHTHALMIC at 21:22

## 2021-10-22 RX ADMIN — SODIUM CHLORIDE, POTASSIUM CHLORIDE, SODIUM LACTATE AND CALCIUM CHLORIDE: 600; 310; 30; 20 INJECTION, SOLUTION INTRAVENOUS at 13:44

## 2021-10-22 RX ADMIN — PROPOFOL 100 MCG/KG/MIN: 10 INJECTION, EMULSION INTRAVENOUS at 13:44

## 2021-10-22 RX ADMIN — DIVALPROEX SODIUM 375 MG: 125 CAPSULE ORAL at 17:05

## 2021-10-22 ASSESSMENT — ENCOUNTER SYMPTOMS
ABDOMINAL PAIN: 0
SHORTNESS OF BREATH: 0

## 2021-10-22 ASSESSMENT — PAIN DESCRIPTION - PAIN TYPE: TYPE: ACUTE PAIN

## 2021-10-22 ASSESSMENT — FIBROSIS 4 INDEX: FIB4 SCORE: 0.76

## 2021-10-22 NOTE — PROGRESS NOTES
Hospital Medicine Daily Progress Note    Date of Service  10/22/2021    Chief Complaint  Sameera Salinas is a 71 y.o. female presented 10/21/2021 with black stools     Hospital Course  71 y.o. F with advanced dementia with behavioral dementia (bed bound, max ADL assists), DLP, hypothyroid and hx of DVT/PE (on apixaban) presented 10/21/2021 brought in by ambulance from [facility] with dark stools.  Information obtained from the chart since patient is minimally verbal on presentation due to her underlying dementia. Reportedly, patient had been having dark stools greater than 1 day.  In the ED, hemoglobin was found to be 4.4 and transfusion was ordered.  ER provider gave Kcentra and GI was consulted - planned for EGD 10/22 AM.     Pt was then admitted to Hospitalist services for management of GIB.    Interval Problem Update  10/22  Vitals reviewed; afebrile.  The rest of the vitals within normal parameters; on 2L O2.     At bedside, lower extremity contracted; some spontaneous movement of upper extremity    Alert awake and appeared comfortable; but unable to hold meaningful conversations    GI Consult appreciated  EGD: distal erosive esophagitis (not bleeding); Scattered shallow erosions in duodenal bulb    Discussed with patient's guardian Claudia over the phone -possible plan to discharge back to group home tomorrow morning discussed.  EGD finding with no active source of bleeding explained.  With comfort focused care and hospice set up pending, my recommendation is to stop anticoagulation at this point which Claudia was agreeable.    I have personally seen and examined the patient at bedside. I discussed the plan of care with patient, bedside RN, charge RN and .    Consultants/Specialty  GI    Code Status  DNAR/DNI    Disposition  Patient is not medically cleared.   Anticipate discharge to to home with organized home healthcare and close outpatient follow-up.  I have placed the appropriate  orders for post-discharge needs.    Review of Systems  Review of Systems   Unable to perform ROS: Dementia   Respiratory: Negative for shortness of breath.    Cardiovascular: Negative for chest pain.   Gastrointestinal: Negative for abdominal pain.        Physical Exam  Temp:  [36.1 °C (97 °F)-36.8 °C (98.2 °F)] 36.7 °C (98 °F)  Pulse:  [62-86] 85  Resp:  [14-20] 16  BP: (101-153)/(40-83) 153/82  SpO2:  [90 %-98 %] 95 %    Physical Exam  Vitals and nursing note reviewed.   Constitutional:       General: She is not in acute distress.     Appearance: Normal appearance. She is not ill-appearing.   HENT:      Head: Normocephalic and atraumatic.      Mouth/Throat:      Mouth: Mucous membranes are dry.      Pharynx: Oropharynx is clear.   Eyes:      General: No scleral icterus.     Conjunctiva/sclera: Conjunctivae normal.   Cardiovascular:      Rate and Rhythm: Normal rate and regular rhythm.      Pulses: Normal pulses.      Heart sounds: Normal heart sounds.   Pulmonary:      Effort: Pulmonary effort is normal. No respiratory distress.      Breath sounds: Normal breath sounds. No wheezing.   Abdominal:      General: Bowel sounds are normal. There is no distension.      Palpations: Abdomen is soft.      Tenderness: There is no abdominal tenderness.   Musculoskeletal:         General: No swelling or tenderness.      Comments: Lower extremity contracted  Spontaneous UE movements   Skin:     General: Skin is warm and dry.      Capillary Refill: Capillary refill takes less than 2 seconds.   Neurological:      General: No focal deficit present.      Mental Status: She is alert. Mental status is at baseline.         Fluids    Intake/Output Summary (Last 24 hours) at 10/22/2021 1716  Last data filed at 10/22/2021 1405  Gross per 24 hour   Intake 100 ml   Output --   Net 100 ml       Laboratory  Recent Labs     10/21/21  1000 10/21/21  1452 10/21/21  2053 10/22/21  0417 10/22/21  1231   WBC 7.9  --   --  10.4  --    RBC 2.27*  --    --  3.52*  --    HEMOGLOBIN 4.4*   < > 8.9* 8.4* 8.9*   HEMATOCRIT 17.3*  --   --  27.9*  --    MCV 76.2*  --   --  79.3*  --    MCH 19.4*  --   --  23.9*  --    MCHC 25.4*  --   --  30.1*  --    RDW 52.3*  --   --  51.8*  --    PLATELETCT 463*  --   --  405  --    MPV 10.5  --   --  10.2  --     < > = values in this interval not displayed.     Recent Labs     10/21/21  1000 10/22/21  0417   SODIUM 141 138   POTASSIUM 3.4* 3.8   CHLORIDE 106 108   CO2 23 20   GLUCOSE 136* 112*   BUN 10 9   CREATININE 0.69 0.58   CALCIUM 8.7 8.5     Recent Labs     10/21/21  1000   APTT 24.9   INR 1.20*               Imaging  [id number] VIEW   Final Result      No evidence of bowel obstruction.           Assessment/Plan  * GI bleed- (present on admission)  Assessment & Plan  Hgb 4.4. Presenting with dark stools  H/o saddle embolism. On eliquis now with a GI bleed. Will dc the eliquis, k centra given by ED.   Transfuse hgb. Goal hgb>7  H&H q8H  GI consulted. EGD finding with no active source of bleeding explained.      Prilosec bid  No indications for colonoscopy at this point        History of pulmonary embolism  Assessment & Plan  H/o saddle embolism. On eliquis now with a GI bleed.  DC Eliquis on admission.   GI consulted.    EGD finding with no active source of bleeding explained.  With comfort focused care and hospice set up pending, my recommendation is to stop anticoagulation at this point which Claudia was agreeable.      Hypokalemia  Assessment & Plan  K 3.4  Potassium chloride 10meq IV x2 on admission     10/22: resolved     Other specified hypothyroidism  Assessment & Plan  Cont home med levothyroxine 50 mcg    Dementia (HCC)- (present on admission)  Assessment & Plan  DNR/DNI  With behavioral disturbance   From baseline history, PT appeared to have advanced dementia (bedbound with max assist in ADLs; unable to hold meaningful conversations and intermittent confusions)    Patient's guardian Claudia is leaning toward  hospice set up at group home. She would like Pt's PCP Dr. Velásquez to set this up when DC.       VTE prophylaxis: SCDs/TEDs    I have performed a physical exam and reviewed and updated ROS and Plan today (10/22/2021).

## 2021-10-22 NOTE — OR NURSING
1407  RECEIVED PATIENT FROM OR.  REPORT FROM .  NO AIRWAY IN PLACE.  RESPIRATIONS ARE EVEN AND UNLABORED.  NO BLEEDING NOTED.      1417  FIRST BP THAT I WAS ABLE TO OBTAIN.  PATIENT IS CONFUSED.    1508  TRANSFERRED TO [place] [id number].  REPORT TO KARINE HWANG.

## 2021-10-22 NOTE — OR SURGEON
Immediate Post OP Note    PreOp Diagnosis: anemia and dark stools, dementia and unable to provide history      PostOp Diagnosis: blood noted in mouth without clear source (no obvious epistaxis), distal erosive esophagitis LA Class D, hiatal hernia, erosive duodenitis       Procedure(s):  GASTROSCOPY - Wound Class: Clean Contaminated  GASTROSCOPY, WITH BIOPSY - Wound Class: Clean Contaminated    Surgeon(s):  Adriana Barba M.D.    Anesthesiologist/Type of Anesthesia:  Anesthesiologist: Tomasa Estevez M.D./General    Surgical Staff:  Endoscopy Technician: Ashli Vasquez  Endoscopy Nurse: Carley Cummings R.N.    Specimens removed if any:  ID Type Source Tests Collected by Time Destination   A : BX FOR DUODENITIS Tissue Duodenum PATHOLOGY SPECIMEN Adriana Barba M.D. 10/22/2021  1:52 PM    B : BX TO R/O H-PYLORI Tissue Gastric PATHOLOGY SPECIMEN Adriana Barba M.D. 10/22/2021  1:52 PM        Estimated Blood Loss: scant    Findings:   Scope introduced into mouth and blood noted on buccal mucosa and in laryngeal region--no source idenitfied, mild in nature.    Esoph;  Distal 2 cm circumferential erosive esophagitis, friable appearing but not bleeding  Stomach: 33-37cm hiatal hernia, no blood in lumen of stomach, gastric bx for Hpylori  Duod:  Scattered shallow erosions in bulb, D2-D3 normal, bx taken    JACOB performed and dark brown stool, no mass in vault    Complications: none  Rec:    1.  GI soft diet  2.  Resume anticoagulation  3.  Start Prilosec 20 mg BID 30 min ac  4.  No plans for colonoscopy at this time unless active GI bleed or dropping H&H  5.  No NSAIDs x 4 weeks        10/22/2021 2:06 PM Adriana Barba M.D.

## 2021-10-22 NOTE — ANESTHESIA POSTPROCEDURE EVALUATION
Patient: Sameera Salinas    Procedure Summary     Date: 10/22/21 Room / Location: [facility] ROOM 26 / SURGERY SAME DAY [facility]    Anesthesia Start: 1340 Anesthesia Stop: 1410    Procedures:       GASTROSCOPY (N/A Esophagus)      GASTROSCOPY, WITH BIOPSY (N/A Esophagus) Diagnosis: (DUODENITIS/ HIATAL HERNIA/ EROSIVE ESOPHAGITIS)    Surgeons: Adriana Barba M.D. Responsible Provider: Tomasa Estevez M.D.    Anesthesia Type: general ASA Status: 2          Final Anesthesia Type: general  Last vitals  BP   Blood Pressure : 115/58    Temp   36.4 °C (97.5 °F)    Pulse   73   Resp   15    SpO2   97 %      Anesthesia Post Evaluation    Patient location during evaluation: PACU  Patient participation: complete - patient participated  Level of consciousness: awake and alert    Airway patency: patent  Anesthetic complications: no  Cardiovascular status: hemodynamically stable  Respiratory status: acceptable  Hydration status: euvolemic    PONV: none          No complications documented.     Nurse Pain Score: 1 (NPRS)

## 2021-10-22 NOTE — ANESTHESIA TIME REPORT
Anesthesia Start and Stop Event Times     Date Time Event    10/22/2021 1327 Ready for Procedure     1340 Anesthesia Start     1410 Anesthesia Stop        Responsible Staff  10/22/21    Name Role Begin End    Tomasa Estevez M.D. Anesth 1340 1410        Preop Diagnosis (Free Text):  Pre-op Diagnosis     ANEMIA/ PROBABLE MELENA        Preop Diagnosis (Codes):    Premium Reason  Non-Premium    Comments:

## 2021-10-22 NOTE — DISCHARGE PLANNING
Anticipated Discharge Disposition:   Group Home    Action:   ERI ERAZO called Keon Ruff, Care manager and Guardian Rep for [facility]. Per Keon, pt lives at [facility], owner Patricia at [phone]. Received consent for ERI ERAZO to call Patricia. Per Keon,  will need to be informed about discharge plan and pt needs COVID test prior to discharge. Per Keon, discharge plan is for pt to go back to the .     ERI ERAZO called Patricia. Per Patricia, pt is bed bound, confused, able to provide short answers but cannot carry a conversation, has dementia, 1:1 feeder. The  needs a negative COVID result within 72 hours of discharge. They do not have transportation, [facility] to set up transportation for pt at discharge.pharmacys they use is [facility] on [address]. MD informed about above information.     Barriers to Discharge:   Medical clearance  Transportation set up at [facility]    Plan:   Hospital Care Management will continue to follow and assist with discharge planning needs.                  Care Transition Team Assessment    Information Source  Orientation Level: Oriented to person  Information Given By: Legal Guardian  Informant's Name: KEON RUFF;  (with [facility])  Who is responsible for making decisions for patient? : Guardian  Name(s) of Primary Decision Maker: KEON RUFF         Elopement Risk  Legal Hold: No  Ambulatory or Self Mobile in Wheelchair: No-Not an Elopement Risk    Interdisciplinary Discharge Planning  Primary Care Physician: SIMA SANON M.D.  Lives with - Patient's Self Care Capacity: Other (Comments) ()  Support Systems: Home Care Staff  Housing / Facility: residential  Name of Care Facility: [facility]  Mobility Issues: Yes  Patient Prefers to be Discharged to:: Group Home  Assistance Needed: Unknown at this Time  Durable Medical Equipment: Other - Specify (wheelchair, bedbound now)    Discharge Preparedness  What is your plan after  discharge?: Group home  What are your discharge supports?: Guardian, Other (comment) ( staff)  Prior Functional Level: Needs Assist with Activities of Daily Living, Needs Assist with Medication Management, Other (Comments) (bedbound)    Functional Assesment  Prior Functional Level: Needs Assist with Activities of Daily Living, Needs Assist with Medication Management, Other (Comments) (bedbound)    Finances  Financial Barriers to Discharge: No  Prescription Coverage: Yes              Advance Directive  Advance Directive?:  (Guardianship)    Domestic Abuse  Have you ever been the victim of abuse or violence?: No         Discharge Risks or Barriers  Discharge risks or barriers?: Complex medical needs  Patient risk factors: Complex medical needs    Anticipated Discharge Information  Discharge Disposition: D/T to facility providing retirement/supportive care (04)

## 2021-10-22 NOTE — CONSULTS
DATE OF SERVICE:  10/21/2021     GASTROENTEROLOGY CONSULTATION     REFERRING PHYSICIAN: Dr. Nigel Abrams.     REASON FOR CONSULTATION:  Anemia and probable melena.     HISTORY OF PRESENT ILLNESS: The patient is a 71-year-old female who is unable   to provide any history due to a history of dementia.  One of her notes in the   computer states Alzheimer's.  She was seen in triage this morning after being   brought in by [facility] from a group home with dark tarry stool for one day.  It   was commented that the patient appeared pale.  Again, I am unable to obtain   any history.  She is not in acute distress and does not seem to be in pain.     ALLERGIES:  AMPICILLIN.     MEDICATIONS:  Prior to admission, ascorbic acid 500 mg daily, calcium   carbonate cholecalciferol 500-200 one tablet daily, Depakote 125 mg Delayed   Release Sprinkles 375 mg twice daily, melatonin 10 mg at bedtime, Seroquel 100   mg daily, Tylenol 500 mg t.i.d., apixaban 5 mg twice daily, atorvastatin 20   mg every evening, furosemide 40 mg daily, latanoprost 0.005% one drop both   eyes every evening, levothyroxine 50 mcg daily, Calmoseptine ointment daily,   mirtazapine 15 mg every evening, multivitamin daily, polyethylene glycol   MiraLax 17 grams daily, potassium chloride 20 mEq daily, raloxifene 60 mg   daily, trazodone 50 mg daily.     PAST SURGICAL HISTORY:  Unable to obtain.     MEDICAL ILLNESSES:  Dementia, hypothyroidism, dyslipidemia.     SOCIAL HISTORY:  Unable to obtain.     FAMILY HISTORY:  Unable to obtain.     REVIEW OF SYSTEMS:  Unable to obtain.     PHYSICAL EXAMINATION:  VITAL SIGNS:  Temperature 36.2, pulse 83, blood pressure 113/53, respirations   20.  GENERAL:  This is a pale-appearing 71-year-old female in no acute distress.  HEENT:  Pupils are round.  Sclerae are anicteric.  She refuses to open her   mouth for examination.  NECK:  Soft, no adenopathy.  LUNGS:  Clear.  CARDIOVASCULAR:  S1, S2, without murmur, gallop or rub.  ABDOMEN:   Distended.  Tympany throughout.  Bowel sounds present, nontender, no   palpable masses.  EXTREMITIES:  No clubbing, cyanosis, peripheral edema.  SKIN:  Smooth, moist, and warm.  NEUROLOGIC:  She is awake.  She is alert, does not follow commands. Contracted   in her upper extremities.     LABORATORY DATA:  WBC 7.9, hemoglobin 4.4, hematocrit 17.3, MCV 76.2,   platelets 463, 63.5% polys.  Sodium 141, potassium 3.4, chloride 106,   bicarbonate 23, BUN 10, creatinine 0.69, AST 13, ALT 9, alkaline phosphatase   64, total bilirubin 0.3, albumin 3.2.  INR 1.20.     IMPRESSION:    1.  Chronic blood loss anemia.  2.  Probable melena.  3.  Anticoagulation, Eliquis for deep venous thrombosis and saddle embolus.    The patient has been given Kcentra.  4.  Hypothyroidism.  5.  Dyslipidemia.     RECOMMENDATIONS:  I have asked for the consent for an upper endoscopy to be   signed by the family or legal guardian.  I am not sure if that will be   available.  Consent will then need to be 2-physician consent.  We could   proceed with her procedure tomorrow being that she has received the Kcentra.    She has abdominal distention, possibly due to constipation.  I am going to   order a KUB of her abdomen to see how much distention she has as I am a little   concerned about putting additional air for an EGD if she is impacted.        ______________________________  MD WANDA LAW/RAMON    DD:  10/21/2021 17:06  DT:  10/21/2021 18:29    Job#:  [id number]

## 2021-10-22 NOTE — PROGRESS NOTES
4 Eyes Skin Assessment Completed by ERI Davis and ERI Rosas.    Head WDL  Ears WDL  Nose WDL  Mouth WDL  Neck Redness and Blanching  Breast/Chest WDL  Shoulder Blades WDL  Spine Redness and Blanching  (R) Arm/Elbow/Hand Redness and Blanching  (L) Arm/Elbow/Hand Redness and Blanching  Abdomen Redness and Blanching  Groin Redness and Blanching  Scrotum/Coccyx/Buttocks Redness and Blanching  (R) Leg WDL  (L) Leg WDL  (R) Heel/Foot/Toe Redness and Blanching  (L) Heel/Foot/Toe Redness and Blanching          Devices In Places Pulse Ox and Nasal Cannula      Interventions In Place Gray Ear Foams, NC W/Ear Foams, Pillows and Pressure Redistribution Mattress    Possible Skin Injury No    Pictures Uploaded Into Epic N/A  Wound Consult Placed N/A  RN Wound Prevention Protocol Ordered No

## 2021-10-22 NOTE — ANESTHESIA PREPROCEDURE EVALUATION
Relevant Problems   NEURO   (positive) History of pulmonary embolism      CARDIAC   (positive) Acute deep vein thrombosis (DVT) of femoral vein of left lower extremity (HCC)   (positive) Acute saddle pulmonary embolism with acute cor pulmonale (HCC)      ENDO   (positive) Hypothyroidism       Physical Exam    Airway   Mallampati: II  TM distance: >3 FB  Neck ROM: full       Cardiovascular - normal exam  Rhythm: regular  Rate: normal  (-) murmur     Dental - normal exam    Unable to assess dental       Pulmonary - normal exam  Breath sounds clear to auscultation     Abdominal   Abdomen: soft     Neurological - normal exam                 Anesthesia Plan    ASA 2       Plan - general       Airway plan will be natural airway    (Alzheimer's with dementia.  Consent by legal guardian.  Patient alert and not oriented. EGD for recent GI bleed.  Hb 8 today after transfusion. )      Induction: intravenous      Pertinent diagnostic labs and testing reviewed    Informed Consent:    Anesthetic plan and risks discussed with legal guardian.    Use of blood products discussed with: legal guardian whom consented to blood products.

## 2021-10-22 NOTE — PROCEDURES
DATE OF PROCEDURE:  10/22/2021     PROCEDURE:  Esophagogastroduodenoscopy with biopsies.     PREPROCEDURE DIAGNOSES:  A 71-year-old female with dementia, unable to provide   history.  The patient was presented to the emergency room with history of   anemia and dark stools.  The patient has been on Eliquis for DVT and saddle   embolus.     POSTPROCEDURE DIAGNOSES:  1.  Blood noted in the mouth without clear source.  2.  Distal erosive esophagitis, LA class D, not actively bleeding.  3.  Hiatal hernia.  4.  Erosive duodenitis.     CONSENT:  The patient's legal guardian signed the consent as the patient is   unable to give consent.     ANESTHESIA:  Monitored anesthesia care.     ANESTHESIOLOGIST:  Tomasa Estevez MD     DESCRIPTION OF PROCEDURE:  The patient was turned in the left lateral   decubitus position and anesthesia monitoring was in place.  The Olympus adult   flexible endoscope was inserted into the esophagus under direct visualization.    Slowly advanced to the distal esophagus and the most distal 2 cm was   erosion.  It appeared friable, but there was no active bleeding.  The GE   junction was at 33 cm and a diaphragmatic hiatus was at 37 cm.  There were no   mucosal abnormalities in the hiatal hernia, but scant blood had been seen in   the esophagus and the hernia sac, most likely from the bleeding that was noted   in the mouth.  The endoscope was advanced into the stomach and air was   insufflated.  The endoscope was advanced from the body to the antrum.  There   were no mucosal abnormalities distally.  Retroflexion was performed where   again the hiatal hernia was appreciated.  Retroflexion was taken down and the   endoscope was advanced through the pylorus into the duodenal bulb, where there   were scattered shallow erosions in the anterior and posterior bulb.  Biopsies   were taken.  The endoscope was advanced into the second and third portion of   the duodenum and there were no mucosal abnormalities.   The endoscope was   pulled back into the stomach and biopsies were taken of the antrum for   Helicobacter pylori.  Air was removed and the endoscope was removed.  The   patient tolerated the procedure well.  No complications.        ______________________________  MD WANDA LAW/PEEWEE/AUBRIE    DD:  10/22/2021 14:17  DT:  10/22/2021 14:36    Job#:  [id number]

## 2021-10-23 LAB
ANION GAP SERPL CALC-SCNC: 11 MMOL/L (ref 7–16)
BUN SERPL-MCNC: 9 MG/DL (ref 8–22)
CALCIUM SERPL-MCNC: 8.9 MG/DL (ref 8.5–10.5)
CHLORIDE SERPL-SCNC: 110 MMOL/L (ref 96–112)
CO2 SERPL-SCNC: 21 MMOL/L (ref 20–33)
CREAT SERPL-MCNC: 0.63 MG/DL (ref 0.5–1.4)
ERYTHROCYTE [DISTWIDTH] IN BLOOD BY AUTOMATED COUNT: 55.1 FL (ref 35.9–50)
FLUAV RNA SPEC QL NAA+PROBE: NEGATIVE
FLUBV RNA SPEC QL NAA+PROBE: NEGATIVE
GLUCOSE SERPL-MCNC: 96 MG/DL (ref 65–99)
HCT VFR BLD AUTO: 27.2 % (ref 37–47)
HGB BLD-MCNC: 7.9 G/DL (ref 12–16)
MCH RBC QN AUTO: 23.4 PG (ref 27–33)
MCHC RBC AUTO-ENTMCNC: 29 G/DL (ref 33.6–35)
MCV RBC AUTO: 80.7 FL (ref 81.4–97.8)
MORPHOLOGY BLD-IMP: NORMAL
PLATELET # BLD AUTO: 367 K/UL (ref 164–446)
PMV BLD AUTO: 9.9 FL (ref 9–12.9)
POTASSIUM SERPL-SCNC: 3.6 MMOL/L (ref 3.6–5.5)
RBC # BLD AUTO: 3.37 M/UL (ref 4.2–5.4)
RSV RNA SPEC QL NAA+PROBE: NEGATIVE
SARS-COV-2 RNA RESP QL NAA+PROBE: NOTDETECTED
SODIUM SERPL-SCNC: 142 MMOL/L (ref 135–145)
SPECIMEN SOURCE: NORMAL
WBC # BLD AUTO: 8.3 K/UL (ref 4.8–10.8)

## 2021-10-23 PROCEDURE — 700102 HCHG RX REV CODE 250 W/ 637 OVERRIDE(OP): Performed by: INTERNAL MEDICINE

## 2021-10-23 PROCEDURE — 0241U HCHG SARS-COV-2 COVID-19 NFCT DS RESP RNA 4 TRGT MIC: CPT

## 2021-10-23 PROCEDURE — 770001 HCHG ROOM/CARE - MED/SURG/GYN PRIV*

## 2021-10-23 PROCEDURE — 700102 HCHG RX REV CODE 250 W/ 637 OVERRIDE(OP): Performed by: STUDENT IN AN ORGANIZED HEALTH CARE EDUCATION/TRAINING PROGRAM

## 2021-10-23 PROCEDURE — 99232 SBSQ HOSP IP/OBS MODERATE 35: CPT | Performed by: STUDENT IN AN ORGANIZED HEALTH CARE EDUCATION/TRAINING PROGRAM

## 2021-10-23 PROCEDURE — 36415 COLL VENOUS BLD VENIPUNCTURE: CPT

## 2021-10-23 PROCEDURE — C9803 HOPD COVID-19 SPEC COLLECT: HCPCS | Performed by: STUDENT IN AN ORGANIZED HEALTH CARE EDUCATION/TRAINING PROGRAM

## 2021-10-23 PROCEDURE — A9270 NON-COVERED ITEM OR SERVICE: HCPCS | Performed by: STUDENT IN AN ORGANIZED HEALTH CARE EDUCATION/TRAINING PROGRAM

## 2021-10-23 PROCEDURE — 80048 BASIC METABOLIC PNL TOTAL CA: CPT

## 2021-10-23 PROCEDURE — A9270 NON-COVERED ITEM OR SERVICE: HCPCS | Performed by: INTERNAL MEDICINE

## 2021-10-23 PROCEDURE — 85027 COMPLETE CBC AUTOMATED: CPT

## 2021-10-23 RX ORDER — OMEPRAZOLE 20 MG/1
20 CAPSULE, DELAYED RELEASE ORAL 2 TIMES DAILY
Qty: 30 CAPSULE | Refills: 0 | Status: SHIPPED
Start: 2021-10-23

## 2021-10-23 RX ADMIN — LATANOPROST 1 DROP: 50 SOLUTION OPHTHALMIC at 19:32

## 2021-10-23 RX ADMIN — DIVALPROEX SODIUM 375 MG: 125 CAPSULE ORAL at 06:24

## 2021-10-23 RX ADMIN — LEVOTHYROXINE SODIUM 50 MCG: 0.05 TABLET ORAL at 06:24

## 2021-10-23 RX ADMIN — ATORVASTATIN CALCIUM 20 MG: 20 TABLET, FILM COATED ORAL at 21:00

## 2021-10-23 RX ADMIN — RALOXIFENE 60 MG: 60 TABLET ORAL at 06:25

## 2021-10-23 RX ADMIN — QUETIAPINE FUMARATE 100 MG: 100 TABLET ORAL at 19:34

## 2021-10-23 RX ADMIN — MIRTAZAPINE 15 MG: 15 TABLET, FILM COATED ORAL at 21:00

## 2021-10-23 RX ADMIN — OMEPRAZOLE 20 MG: 20 CAPSULE, DELAYED RELEASE ORAL at 06:24

## 2021-10-23 RX ADMIN — FUROSEMIDE 40 MG: 40 TABLET ORAL at 06:24

## 2021-10-23 RX ADMIN — DIVALPROEX SODIUM 375 MG: 125 CAPSULE ORAL at 19:32

## 2021-10-23 RX ADMIN — OMEPRAZOLE 20 MG: 20 CAPSULE, DELAYED RELEASE ORAL at 19:32

## 2021-10-23 ASSESSMENT — ENCOUNTER SYMPTOMS
SHORTNESS OF BREATH: 0
ABDOMINAL PAIN: 0

## 2021-10-23 ASSESSMENT — FIBROSIS 4 INDEX: FIB4 SCORE: 0.84

## 2021-10-23 ASSESSMENT — PAIN DESCRIPTION - PAIN TYPE: TYPE: ACUTE PAIN

## 2021-10-23 NOTE — PROGRESS NOTES
Hospital Medicine Daily Progress Note    Date of Service  10/23/2021    Chief Complaint  Sameera Salinas is a 71 y.o. female presented 10/21/2021 with black stools     Hospital Course  71 y.o. F with advanced dementia with behavioral dementia (bed bound, max ADL assists), DLP, hypothyroid and hx of DVT/PE (on apixaban) presented 10/21/2021 brought in by ambulance from assisted [facility] home care with dark stools.  Information obtained from the chart since patient is minimally verbal on presentation due to her underlying dementia. Reportedly, patient had been having dark stools greater than 1 day.  In the ED, hemoglobin was found to be 4.4 and transfusion was ordered.  ER provider gave Kcentra and GI was consulted - planned for EGD 10/22 AM.     Pt was then admitted to Hospitalist services for management of GIB.    10/22: Vitals wnl; afebrile. Lower extremity contracted; some spontaneous movement of upper extremity. Alert awake and appeared comfortable; but unable to hold meaningful conversations  EGD: distal erosive esophagitis (not bleeding); Scattered shallow erosions in duodenal bulb.     Discussed with patient's guardian Claudia over the phone -possible plan to discharge back to group home tomorrow morning discussed.  EGD finding with no active source of bleeding explained.  With comfort focused care and hospice set up pending, my recommendation is to stop anticoagulation at this point which Claudia was agreeable.    Interval Problem Update  10/23  Vitals reviewed; afebrile.  The rest of the vitals within normal parameters in RA.      At bedside, comfortable.  No acute complain.     GI follow-up appreciated - signed off     Hemoglobin 7.9 this AM      I have personally seen and examined the patient at bedside. I discussed the plan of care with patient, bedside RN, charge RN and .    Consultants/Specialty  GI    Code Status  DNAR/DNI    Disposition  Patient is medically cleared.   Anticipate discharge to  to home with organized home healthcare and close outpatient follow-up.  I have placed the appropriate orders for post-discharge needs.    Review of Systems  Review of Systems   Unable to perform ROS: Dementia   Respiratory: Negative for shortness of breath.    Cardiovascular: Negative for chest pain.   Gastrointestinal: Negative for abdominal pain.        Physical Exam  Temp:  [36.3 °C (97.3 °F)-37.3 °C (99.2 °F)] 36.3 °C (97.3 °F)  Pulse:  [77-85] 83  Resp:  [16-18] 16  BP: (100-153)/(42-82) 126/53  SpO2:  [94 %-97 %] 97 %    Physical Exam  Vitals and nursing note reviewed.   Constitutional:       General: She is not in acute distress.     Appearance: Normal appearance. She is not ill-appearing.   HENT:      Head: Normocephalic and atraumatic.      Mouth/Throat:      Mouth: Mucous membranes are dry.      Pharynx: Oropharynx is clear.   Eyes:      General: No scleral icterus.     Conjunctiva/sclera: Conjunctivae normal.   Cardiovascular:      Rate and Rhythm: Normal rate and regular rhythm.      Pulses: Normal pulses.      Heart sounds: Normal heart sounds.   Pulmonary:      Effort: Pulmonary effort is normal. No respiratory distress.      Breath sounds: Normal breath sounds. No wheezing.   Abdominal:      General: Bowel sounds are normal. There is no distension.      Palpations: Abdomen is soft.      Tenderness: There is no abdominal tenderness.   Musculoskeletal:         General: No swelling or tenderness.      Comments: Lower extremity contracted  Spontaneous UE movements   Skin:     General: Skin is warm and dry.      Capillary Refill: Capillary refill takes less than 2 seconds.   Neurological:      General: No focal deficit present.      Mental Status: She is alert. Mental status is at baseline.         Fluids  No intake or output data in the 24 hours ending 10/23/21 1506    Laboratory  Recent Labs     10/21/21  1000 10/21/21  1452 10/22/21  0417 10/22/21  0417 10/22/21  1231 10/22/21  1944 10/23/21  0409   WBC  7.9  --  10.4  --   --   --  8.3   RBC 2.27*  --  3.52*  --   --   --  3.37*   HEMOGLOBIN 4.4*   < > 8.4*   < > 8.9* 8.4* 7.9*   HEMATOCRIT 17.3*  --  27.9*  --   --   --  27.2*   MCV 76.2*  --  79.3*  --   --   --  80.7*   MCH 19.4*  --  23.9*  --   --   --  23.4*   MCHC 25.4*  --  30.1*  --   --   --  29.0*   RDW 52.3*  --  51.8*  --   --   --  55.1*   PLATELETCT 463*  --  405  --   --   --  367   MPV 10.5  --  10.2  --   --   --  9.9    < > = values in this interval not displayed.     Recent Labs     10/21/21  1000 10/22/21  0417 10/23/21  0409   SODIUM 141 138 142   POTASSIUM 3.4* 3.8 3.6   CHLORIDE 106 108 110   CO2 23 20 21   GLUCOSE 136* 112* 96   BUN 10 9 9   CREATININE 0.69 0.58 0.63   CALCIUM 8.7 8.5 8.9     Recent Labs     10/21/21  1000   APTT 24.9   INR 1.20*               Imaging  [id number] VIEW   Final Result      No evidence of bowel obstruction.           Assessment/Plan  * GI bleed- (present on admission)  Assessment & Plan  Hgb 4.4. Presenting with dark stools  H/o saddle embolism. On eliquis presented with a GI bleed. Will dc the eliquis, k centra given by ED.   Transfuse hgb. Goal hgb>7  H&H q8H  GI consulted. EGD finding with no active source of bleeding explained.      Prilosec bid  No indications for colonoscopy at this point    10/23: H&H relatedly stable and no further signs of bleeding at this point; GI sign off        History of pulmonary embolism- (present on admission)  Assessment & Plan  H/o saddle embolism. On eliquis now with a GI bleed.  DC Eliquis on admission.   GI consulted.    EGD finding with no active source of bleeding explained.  With comfort focused care and hospice set up pending, my recommendation is to stop anticoagulation at this point which Claudia was agreeable.      Hypokalemia- (present on admission)  Assessment & Plan  K 3.4  Potassium chloride 10meq IV x2 on admission     10/22: resolved     Other specified hypothyroidism- (present on admission)  Assessment &  Plan  Cont home med levothyroxine 50 mcg    Dementia (HCC)- (present on admission)  Assessment & Plan  DNR/DNI  With behavioral disturbance   From baseline history, PT appeared to have advanced dementia (bedbound with max assist in ADLs; unable to hold meaningful conversations and intermittent confusions)    Patient's guardian Claudia is leaning toward hospice set up at group home. She would like Pt's PCP Dr. Velásquez to set this up when DC.       VTE prophylaxis: SCDs/TEDs    I have performed a physical exam and reviewed and updated ROS and Plan today (10/23/2021).

## 2021-10-23 NOTE — PROGRESS NOTES
DATE OF SERVICE:  10/21/2021     GASTROENTEROLOGY CONSULTATION     REFERRING PHYSICIAN: Dr. Nigel Abrams.     REASON FOR CONSULTATION:  Anemia and probable melena.     HISTORY OF PRESENT ILLNESS: The patient is a 71-year-old female who is unable   to provide any history due to a history of dementia.  One of her notes in the   computer states Alzheimer's.  She was seen in triage this morning after being   brought in by [facility] from a group home with dark tarry stool for one day.  It   was commented that the patient appeared pale.  Again, I am unable to obtain   any history.  She is not in acute distress and does not seem to be in pain.    INTERVAL HISTORY:    10/23/21: Stable. Denies abdominal pain. Not wanting any food. Hgb: 7.9 (8.4 on 10/22/21).     EGD 10/22/21 performed by Dr. Barba: Distal 2 cm circumferentia erosive esophagitis, friable but not bleeding. Stomach: 33-37 cm hiatal hernia, no blood in lumen of stromach, gastric bx for H pylori. Duodenal: shallow erosion in bulb, D2-D3 normal.       ALLERGIES:  AMPICILLIN.     MEDICATIONS:  Prior to admission, ascorbic acid 500 mg daily, calcium   carbonate cholecalciferol 500-200 one tablet daily, Depakote 125 mg Delayed   Release Sprinkles 375 mg twice daily, melatonin 10 mg at bedtime, Seroquel 100   mg daily, Tylenol 500 mg t.i.d., apixaban 5 mg twice daily, atorvastatin 20   mg every evening, furosemide 40 mg daily, latanoprost 0.005% one drop both   eyes every evening, levothyroxine 50 mcg daily, Calmoseptine ointment daily,   mirtazapine 15 mg every evening, multivitamin daily, polyethylene glycol   MiraLax 17 grams daily, potassium chloride 20 mEq daily, raloxifene 60 mg   daily, trazodone 50 mg daily.     PAST SURGICAL HISTORY:  Unable to obtain.     MEDICAL ILLNESSES:  Dementia, hypothyroidism, dyslipidemia.     SOCIAL HISTORY:  Unable to obtain.     FAMILY HISTORY:  Unable to obtain.     REVIEW OF SYSTEMS:  Unable to obtain.     PHYSICAL  EXAMINATION:  GENERAL:  This is a pale-appearing 71-year-old female in no acute distress.  HEENT:  Pupils are round.  Sclerae are anicteric.  She refuses to open her   mouth for examination.  NECK:  Soft, no adenopathy.  LUNGS:  Clear.  CARDIOVASCULAR:  S1, S2, without murmur, gallop or rub.  ABDOMEN:  Distended.  Bowel sounds present, nontender, no palpable masses.  EXTREMITIES:  No clubbing, cyanosis, peripheral edema.  SKIN:  Smooth, moist, and warm.  NEUROLOGIC:  She is awake.  She is alert, does not follow commands. Contracted   in her upper extremities.     LABORATORY DATA:  WBC 7.9, hemoglobin 4.4, hematocrit 17.3, MCV 76.2,   platelets 463, 63.5% polys.  Sodium 141, potassium 3.4, chloride 106,   bicarbonate 23, BUN 10, creatinine 0.69, AST 13, ALT 9, alkaline phosphatase   64, total bilirubin 0.3, albumin 3.2.  INR 1.20.     IMPRESSION:    1.  Chronic blood loss anemia.  2.  Probable melena.  3.  Anticoagulation, Eliquis for deep venous thrombosis and saddle embolus.    4.  Hypothyroidism.  5.  Dyslipidemia.     RECOMMENDATIONS:   1. Prilosec 20mg twice a day prior to meals  2. GI soft diet  3. No NSAIDs for 4 weeks  4. May resume anticoagulation    GI WILL SIGN OFF PLEASE CALL IF ANY QUESTIONS OR CONCERNS    Kait JENSEN

## 2021-10-23 NOTE — DISCHARGE INSTRUCTIONS
Gastrointestinal Bleeding  Gastrointestinal (GI) bleeding is bleeding somewhere along the path that food travels through the body (digestive tract). This path is anywhere between the mouth and the opening of the butt (anus). You may have blood in your poop (stool) or have black poop. If you throw up (vomit), there may be blood in it.  This condition can be mild, serious, or even life-threatening. If you have a lot of bleeding, you may need to stay in the hospital.  What are the causes?  This condition may be caused by:  · Irritation and swelling of the esophagus (esophagitis). The esophagus is part of the body that moves food from your mouth to your stomach.  · Swollen veins in the butt (hemorrhoids).  · Areas of painful tearing in the opening of the butt (anal fissures). These are often caused by passing hard poop.  · Pouches that form on the colon over time (diverticulosis).  · Irritation and swelling (diverticulitis) in areas where pouches have formed on the colon.  · Growths (polyps) or cancer. Colon cancer often starts out as growths that are not cancer.  · Irritation of the stomach lining (gastritis).  · Sores (ulcers) in the stomach.  What increases the risk?  You are more likely to develop this condition if you:  · Have a certain type of infection in your stomach (Helicobacter pylori infection).  · Take certain medicines.  · Smoke.  · Drink alcohol.  What are the signs or symptoms?  Common symptoms of this condition include:  · Throwing up (vomiting) material that has bright red blood in it. It may look like coffee grounds.  · Changes in your poop. The poop may:  ? Have red blood in it.  ? Be black, look like tar, and smell stronger than normal.  ? Be red.  · Pain or cramping in the belly (abdomen).  How is this treated?  Treatment for this condition depends on the cause of the bleeding. For example:  · Sometimes, the bleeding can be stopped during a procedure that is done to find the problem (endoscopy or  colonoscopy).  · Medicines can be used to:  ? Help control irritation, swelling, or infection.  ? Reduce acid in your stomach.  · Certain problems can be treated with:  ? Creams.  ? Medicines that are put in the butt (suppositories).  ? Warm baths.  · Surgery is sometimes needed.  · If you lose a lot of blood, you may need a blood transfusion.  If bleeding is mild, you may be allowed to go home. If there is a lot of bleeding, you will need to stay in the hospital.  Follow these instructions at home:    · Take over-the-counter and prescription medicines only as told by your doctor.  · Eat foods that have a lot of fiber in them. These foods include beans, whole grains, and fresh fruits and vegetables. You can also try eating 1-3 prunes each day.  · Drink enough fluid to keep your pee (urine) pale yellow.  · Keep all follow-up visits as told by your doctor. This is important.  Contact a doctor if:  · Your symptoms do not get better.  Get help right away if:  · Your bleeding does not stop.  · You feel dizzy or you pass out (faint).  · You feel weak.  · You have very bad cramps in your back or belly.  · You pass large clumps of blood (clots) in your poop.  · Your symptoms are getting worse.  · You have chest pain or fast heartbeats.  Summary  · GI bleeding is bleeding somewhere along the path that food travels through the body (digestive tract).  · This bleeding can be caused by many things. Treatment depends on the cause of the bleeding.  · Take medicines only as told by your doctor.  · Keep all follow-up visits as told by your doctor. This is important.  This information is not intended to replace advice given to you by your health care provider. Make sure you discuss any questions you have with your health care provider.    Discharge Instructions    Discharged to home by car with relative. Discharged via wheelchair, hospital escort: Yes.  Special equipment needed: Oxygen    Be sure to schedule a follow-up appointment  with your primary care doctor or any specialists as instructed.     Discharge Plan:   Diet Plan: Discussed  Activity Level: Discussed  Confirmed Follow up Appointment: Patient to Call and Schedule Appointment  Confirmed Symptoms Management: Discussed  Medication Reconciliation Updated: Yes    I understand that a diet low in cholesterol, fat, and sodium is recommended for good health. Unless I have been given specific instructions below for another diet, I accept this instruction as my diet prescription.   Other diet: Low fiber    Special Instructions: None    · Is patient discharged on Warfarin / Coumadin?   No     Depression / Suicide Risk    As you are discharged from this [facility] facility, it is important to learn how to keep safe from harming yourself.    Recognize the warning signs:  · Abrupt changes in personality, positive or negative- including increase in energy   · Giving away possessions  · Change in eating patterns- significant weight changes-  positive or negative  · Change in sleeping patterns- unable to sleep or sleeping all the time   · Unwillingness or inability to communicate  · Depression  · Unusual sadness, discouragement and loneliness  · Talk of wanting to die  · Neglect of personal appearance   · Rebelliousness- reckless behavior  · Withdrawal from people/activities they love  · Confusion- inability to concentrate     If you or a loved one observes any of these behaviors or has concerns about self-harm, here's what you can do:  · Talk about it- your feelings and reasons for harming yourself  · Remove any means that you might use to hurt yourself (examples: pills, rope, extension cords, firearm)  · Get professional help from the community (Mental Health, Substance Abuse, psychological counseling)  · Do not be alone:Call your Safe Contact- someone whom you trust who will be there for you.  · Call your local CRISIS HOTLINE 981-1741 or 808-426-2852  · Call your local Children's Mobile Crisis  Response Team [facility] (302) 641-1211 or www.[employer]  · Call the toll free National Suicide Prevention Hotlines   · National Suicide Prevention Lifeline 352-948-QVAL (4857)  · National Hope Line Network 800-SUICIDE (300-3507)

## 2021-10-23 NOTE — PROGRESS NOTES
"Attempted to feed patient multiple times. Patient purses lips and says \"No\" to her breakfast and lunch after a couple of bites. WCTM and attempt again.   "

## 2021-10-23 NOTE — DISCHARGE PLANNING
Anticipated Discharge Disposition: Back to      Action: Pt's covid test results are back and negative. LSW called [facility] owner and updated Patricia. She is agreeable to Pt returning tomorrow morning at 10AM. CHERRI called Claudia to update her. She is also agreeable to Pt's d/c LSW faxed transport request for [facility] to be scheduled at 10AM. Pending confirmation.     Barriers to Discharge: Transport    Plan: LSW to f/u with DPA on [facility] set up.

## 2021-10-23 NOTE — DISCHARGE PLANNING
JAZMIN spoke to Liz @ [facility], no transports for tomorrow 10/24    Received Transport Form @ 1640  Spoke to Dawn @ [facility]    Transport is scheduled for 10/24 @1000 going to [facility] [place]

## 2021-10-24 VITALS
HEART RATE: 78 BPM | SYSTOLIC BLOOD PRESSURE: 125 MMHG | DIASTOLIC BLOOD PRESSURE: 86 MMHG | BODY MASS INDEX: 24.41 KG/M2 | HEIGHT: 63 IN | OXYGEN SATURATION: 96 % | TEMPERATURE: 98.1 F | RESPIRATION RATE: 16 BRPM | WEIGHT: 137.79 LBS

## 2021-10-24 PROCEDURE — A9270 NON-COVERED ITEM OR SERVICE: HCPCS | Performed by: STUDENT IN AN ORGANIZED HEALTH CARE EDUCATION/TRAINING PROGRAM

## 2021-10-24 PROCEDURE — 700102 HCHG RX REV CODE 250 W/ 637 OVERRIDE(OP): Performed by: STUDENT IN AN ORGANIZED HEALTH CARE EDUCATION/TRAINING PROGRAM

## 2021-10-24 PROCEDURE — 99239 HOSP IP/OBS DSCHRG MGMT >30: CPT | Performed by: STUDENT IN AN ORGANIZED HEALTH CARE EDUCATION/TRAINING PROGRAM

## 2021-10-24 PROCEDURE — 700102 HCHG RX REV CODE 250 W/ 637 OVERRIDE(OP): Performed by: INTERNAL MEDICINE

## 2021-10-24 PROCEDURE — A9270 NON-COVERED ITEM OR SERVICE: HCPCS | Performed by: INTERNAL MEDICINE

## 2021-10-24 RX ADMIN — RALOXIFENE 60 MG: 60 TABLET ORAL at 06:23

## 2021-10-24 RX ADMIN — LEVOTHYROXINE SODIUM 50 MCG: 0.05 TABLET ORAL at 06:21

## 2021-10-24 RX ADMIN — OMEPRAZOLE 20 MG: 20 CAPSULE, DELAYED RELEASE ORAL at 06:21

## 2021-10-24 RX ADMIN — DIVALPROEX SODIUM 375 MG: 125 CAPSULE ORAL at 06:21

## 2021-10-24 RX ADMIN — FUROSEMIDE 40 MG: 40 TABLET ORAL at 06:21

## 2021-10-24 ASSESSMENT — PAIN DESCRIPTION - PAIN TYPE: TYPE: ACUTE PAIN

## 2021-10-24 NOTE — CARE PLAN
Problem: Knowledge Deficit - Standard  Goal: Patient and family/care givers will demonstrate understanding of plan of care, disease process/condition, diagnostic tests and medications  Outcome: Progressing     Problem: Skin Integrity  Goal: Skin integrity is maintained or improved  Outcome: Progressing     Problem: Fall Risk  Goal: Patient will remain free from falls  Outcome: Progressing   The patient is Stable - Low risk of patient condition declining or worsening    Shift Goals  Clinical Goals: meds taken  Patient Goals: michael  Family Goals: michael    Progress made toward(s) clinical / shift goals:  discharge to group home today    Patient is not progressing towards the following goals:

## 2021-10-24 NOTE — PROGRESS NOTES
Iv dced discharge instructions given to [facility] and paperwork for the group home belongings with patient

## 2021-10-24 NOTE — CARE PLAN
The patient is vss    Shift Goals  Clinical Goals: meds taken  Patient Goals: michael  Family Goals: michael    Progress made toward(s) clinical / shift goals:  yes    Patient is not progressing towards the following goals:      Problem: Knowledge Deficit - Standard  Goal: Patient and family/care givers will demonstrate understanding of plan of care, disease process/condition, diagnostic tests and medications  Outcome: Not Progressing     Problem: Knowledge Deficit - Standard  Goal: Patient and family/care givers will demonstrate understanding of plan of care, disease process/condition, diagnostic tests and medications  Outcome: Not Progressing

## 2021-10-24 NOTE — DISCHARGE SUMMARY
Discharge Summary    CHIEF COMPLAINT ON ADMISSION  Chief Complaint   Patient presents with   • Bloody Stools     c/o dark tarry stool x 1 day per EMS report.       Reason for Admission  GIB    Admission Date  10/21/2021    CODE STATUS  DNR/DNI    HPI & HOSPITAL COURSE  71 y.o. 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  Information obtained from the chart since patient is minimally verbal on presentation due to her underlying dementia. Reportedly, patient had been having dark stools greater than 1 day.  In the ED, hemoglobin was found to be 4.4 and transfusion was ordered.  ER provider gave Kcentra and GI was consulted - planned for EGD 10/22 AM.      Pt was then admitted to Hospitalist services for management of GIB.     10/22: Vitals wnl; afebrile. Lower extremity contracted; some spontaneous movement of upper extremity. Alert awake and appeared comfortable; but unable to hold meaningful conversations  EGD: distal erosive esophagitis (not bleeding); Scattered shallow erosions in duodenal bulb.      Discussed with patient's guardian Claudia over the phone -possible plan to discharge back to group home tomorrow morning discussed.  EGD finding with no active source of bleeding explained.  With comfort focused care and hospice set up pending, my recommendation is to stop anticoagulation at this point which Claudia was agreeable.    10/23: H&H relatively stable. No further episodes of melena. Deemed clinically stable to be DC back to group home pending transport set up. GI followed up and signed off.     10/24: Vitals wnl; satting well in RA. Exam grossly unremarkable. Mental status seems to be baseline. Hence, plan is to DC to her group home.      Therefore, she is discharged in guarded and stable condition to home with organized home healthcare  and close outpatient follow-up.    The patient met 2-midnight criteria for an inpatient stay at the time of discharge.    Discharge Date  10/24/2021    FOLLOW UP ITEMS POST DISCHARGE  N/A    DISCHARGE DIAGNOSES  Principal Problem:    GI bleed POA: Yes  Active Problems:    History of pulmonary embolism POA: Yes    Dementia (HCC) POA: Yes    Other specified hypothyroidism POA: Yes    Hypokalemia POA: Yes  Resolved Problems:    * No resolved hospital problems. *      FOLLOW UP  Noni Velásquez M.D.  [address] [address]  [facility] [address]  [phone]    Schedule an appointment as soon as possible for a visit  For a follow up      MEDICATIONS ON DISCHARGE     Medication List      START taking these medications      Instructions   omeprazole 20 MG delayed-release capsule  Commonly known as: PRILOSEC   Take 1 Capsule by mouth 2 times a day.  Dose: 20 mg        CONTINUE taking these medications      Instructions   acetaminophen 500 MG Tabs  Commonly known as: TYLENOL   Take 500 mg by mouth 3 times a day.  Dose: 500 mg     atorvastatin 20 MG Tabs  Commonly known as: LIPITOR   Take 20 mg by mouth every evening.  Dose: 20 mg     Calmoseptine 0.44-20.6 % Oint ointment  Generic drug: menthol-zinc oxide   Apply 1 Application topically every day.  Dose: 1 Application     divalproex 125 MG Csdr  Commonly known as: DEPAKOTE SPRINKLE   Take 375 mg by mouth 2 times a day.  Dose: 375 mg     furosemide 40 MG Tabs  Commonly known as: LASIX   Take 40 mg by mouth every day.  Dose: 40 mg     latanoprost 0.005 % Soln  Commonly known as: XALATAN   Administer 1 Drop into both eyes every evening.  Dose: 1 Drop     levothyroxine 50 MCG Tabs  Commonly known as: SYNTHROID   Take 50 mcg by mouth Every morning on an empty stomach.  Dose: 50 mcg     Melatonin 10 MG Tabs   Take 10 mg by mouth at bedtime.  Dose: 10 mg     mirtazapine 15 MG Tabs  Commonly known as: Remeron   Take 15 mg by mouth every evening.  Dose: 15 mg     multivitamin Tabs   Take 1 Tab  by mouth every day.  Dose: 1 Tablet     Oyster Calcium/D3 500-200 MG-UNIT Tabs  Generic drug: Calcium Carb-Cholecalciferol   Take 1 Tablet by mouth every day.  Dose: 1 Tablet     polyethylene glycol/lytes 17 g Pack  Commonly known as: MIRALAX   Take 17 g by mouth every day.  Dose: 17 g     potassium chloride SA 20 MEQ Tbcr  Commonly known as: Kdur   Take 20 mEq by mouth every day.  Dose: 20 mEq     QUEtiapine 100 MG Tabs  Commonly known as: Seroquel   Take 100 mg by mouth every day.  Dose: 100 mg     raloxifene 60 MG Tabs  Commonly known as: EVISTA   Take 60 mg by mouth every day.  Dose: 60 mg     traZODone 50 MG Tabs  Commonly known as: DESYREL   Take 50 mg by mouth every evening.  Dose: 50 mg     Vitamin C 500 MG Caps   Take 500 mg by mouth every day.  Dose: 500 mg        STOP taking these medications    Eliquis 5mg Tabs  Generic drug: apixaban            Allergies  Allergies   Allergen Reactions   • Ampicillin Rash     Tolerated ceftriaxone       DIET  Cardiac    ACTIVITY  As tolerated.  Weight bearing as tolerated    CONSULTATIONS  GI    PROCEDURES  EGD    LABORATORY  Lab Results   Component Value Date    SODIUM 142 10/23/2021    POTASSIUM 3.6 10/23/2021    CHLORIDE 110 10/23/2021    CO2 21 10/23/2021    GLUCOSE 96 10/23/2021    BUN 9 10/23/2021    CREATININE 0.63 10/23/2021        Lab Results   Component Value Date    WBC 8.3 10/23/2021    HEMOGLOBIN 7.9 (L) 10/23/2021    HEMATOCRIT 27.2 (L) 10/23/2021    PLATELETCT 367 10/23/2021        Total time of the discharge process exceeds 35 minutes.

## 2024-01-06 NOTE — ED NOTES
Addended by: SO CLEMONS on: 1/5/2024 07:30 PM     Modules accepted: Orders     Pink top sent to blood bank for COD.

## 2024-10-21 NOTE — PROGRESS NOTES
12 hr cc    Monitor Summary:  SR 88-98  PVC, PAC  .14/.08/.34   Endorse orthopnea, PND, and weight gain prior to admission  - Home regimen: Lasix 40mg thrice weekly  -  (from 1253 in 3/2024)  - On exam with JVD and trace to 1+ BLE edema  - TTE (3/2024): LVEF 70%, on RWMA, normal LA/RV  - EKG (10/20): LVH w/ strain, A-paced    Plan:  - Will give IV Lasix 40mg x 1 and reassess daily for diuresis  - Daily weight, strict I&O Endorse orthopnea, PND, and weight gain prior to admission. Baseline weight 250 pounds, now weighing around 290 pounds  - Home regimen: Lasix 40mg thrice weekly  -  (from 1253 in 3/2024)  - On exam with JVD and trace to 1+ BLE edema  - TTE (3/2024): LVEF 70%, on RWMA, normal LA/RV  - EKG (10/20): LVH w/ strain, A-paced    Plan:  - Will give IV Lasix 40mg x 1 and reassess daily for diuresis  - Daily weight, strict I&O  - Goal net output 1-2L daily

## (undated) DEVICE — TOWEL STOP TIMEOUT SAFETY FLAG (40EA/CA)

## (undated) DEVICE — CONTAINER, SPECIMEN, STERILE

## (undated) DEVICE — GVL 3 STAT DISPOSABLE - (10/BX)

## (undated) DEVICE — FORCEP RADIAL JAW 4 STANDARD CAPACITY W/NEEDLE 240CM (40EA/BX)

## (undated) DEVICE — FILM CASSETTE ENDO

## (undated) DEVICE — SET LEADWIRE 5 LEAD BEDSIDE DISPOSABLE ECG (1SET OF 5/EA)

## (undated) DEVICE — ELECTRODE 850 FOAM ADHESIVE - HYDROGEL RADIOTRNSPRNT (50/PK)

## (undated) DEVICE — TUBE CONNECTING SUCTION - CLEAR PLASTIC STERILE 72 IN (50EA/CA)

## (undated) DEVICE — TUBING O2 7FT TIP SMTH BORE - (50/CA)

## (undated) DEVICE — CANISTER SUCTION RIGID RED 1500CC (40EA/CA)

## (undated) DEVICE — WATER IRRIGATION STERILE 1000ML (12EA/CA)

## (undated) DEVICE — SUCTION INSTRUMENT YANKAUER BULBOUS TIP W/O VENT (50EA/CA)

## (undated) DEVICE — MASK PANORAMIC OXYGEN PRO2 (30EA/CA)

## (undated) DEVICE — BITE BLOCK ADULT 60FR (100EA/CA)

## (undated) DEVICE — KIT CUSTOM PROCEDURE SINGLE FOR ENDO  (15/CA)

## (undated) DEVICE — SENSOR SPO2 ADULT LNCS ADTX (20/BX) ORDER ITEM #19593